# Patient Record
Sex: MALE | Race: WHITE | NOT HISPANIC OR LATINO | Employment: OTHER | URBAN - NONMETROPOLITAN AREA
[De-identification: names, ages, dates, MRNs, and addresses within clinical notes are randomized per-mention and may not be internally consistent; named-entity substitution may affect disease eponyms.]

---

## 2017-01-06 ENCOUNTER — COMMUNICATION - GICH (OUTPATIENT)
Dept: INTERNAL MEDICINE | Facility: OTHER | Age: 82
End: 2017-01-06

## 2017-01-06 DIAGNOSIS — I10 ESSENTIAL (PRIMARY) HYPERTENSION: ICD-10-CM

## 2017-01-07 ENCOUNTER — COMMUNICATION - GICH (OUTPATIENT)
Dept: INTERNAL MEDICINE | Facility: OTHER | Age: 82
End: 2017-01-07

## 2017-01-07 DIAGNOSIS — R39.9 UNSPECIFIED SYMPTOMS AND SIGNS INVOLVING THE GENITOURINARY SYSTEM: ICD-10-CM

## 2017-02-06 ENCOUNTER — COMMUNICATION - GICH (OUTPATIENT)
Dept: INTERNAL MEDICINE | Facility: OTHER | Age: 82
End: 2017-02-06

## 2017-02-06 DIAGNOSIS — R39.9 UNSPECIFIED SYMPTOMS AND SIGNS INVOLVING THE GENITOURINARY SYSTEM: ICD-10-CM

## 2017-02-07 ENCOUNTER — COMMUNICATION - GICH (OUTPATIENT)
Dept: INTERNAL MEDICINE | Facility: OTHER | Age: 82
End: 2017-02-07

## 2017-02-07 DIAGNOSIS — I10 ESSENTIAL (PRIMARY) HYPERTENSION: ICD-10-CM

## 2017-07-21 ENCOUNTER — HISTORY (OUTPATIENT)
Dept: INTERNAL MEDICINE | Facility: OTHER | Age: 82
End: 2017-07-21

## 2017-07-21 ENCOUNTER — OFFICE VISIT - GICH (OUTPATIENT)
Dept: INTERNAL MEDICINE | Facility: OTHER | Age: 82
End: 2017-07-21

## 2017-07-21 DIAGNOSIS — K64.9 HEMORRHOIDS: ICD-10-CM

## 2017-10-07 ENCOUNTER — COMMUNICATION - GICH (OUTPATIENT)
Dept: INTERNAL MEDICINE | Facility: OTHER | Age: 82
End: 2017-10-07

## 2017-10-07 DIAGNOSIS — I10 ESSENTIAL (PRIMARY) HYPERTENSION: ICD-10-CM

## 2017-11-20 ENCOUNTER — HISTORY (OUTPATIENT)
Dept: INTERNAL MEDICINE | Facility: OTHER | Age: 82
End: 2017-11-20

## 2017-11-20 ENCOUNTER — OFFICE VISIT - GICH (OUTPATIENT)
Dept: INTERNAL MEDICINE | Facility: OTHER | Age: 82
End: 2017-11-20

## 2017-11-20 DIAGNOSIS — I10 ESSENTIAL (PRIMARY) HYPERTENSION: ICD-10-CM

## 2017-11-20 DIAGNOSIS — J32.0 CHRONIC MAXILLARY SINUSITIS: ICD-10-CM

## 2017-11-20 DIAGNOSIS — R39.9 UNSPECIFIED SYMPTOMS AND SIGNS INVOLVING THE GENITOURINARY SYSTEM: ICD-10-CM

## 2017-11-20 DIAGNOSIS — M19.90 OSTEOARTHRITIS: ICD-10-CM

## 2017-11-20 DIAGNOSIS — R73.9 HYPERGLYCEMIA: ICD-10-CM

## 2017-11-20 LAB
A/G RATIO - HISTORICAL: 1.7 (ref 1–2)
ALBUMIN SERPL-MCNC: 4.3 G/DL (ref 3.5–5.7)
ALP SERPL-CCNC: 59 IU/L (ref 34–104)
ALT (SGPT) - HISTORICAL: 19 IU/L (ref 7–52)
ANION GAP - HISTORICAL: 12 (ref 5–18)
AST SERPL-CCNC: 17 IU/L (ref 13–39)
BILIRUB SERPL-MCNC: 1.2 MG/DL (ref 0.3–1)
BUN SERPL-MCNC: 21 MG/DL (ref 7–25)
BUN/CREAT RATIO - HISTORICAL: 24
CALCIUM SERPL-MCNC: 9.2 MG/DL (ref 8.6–10.3)
CHLORIDE SERPLBLD-SCNC: 103 MMOL/L (ref 98–107)
CHOL/HDL RATIO - HISTORICAL: 2.38
CHOLESTEROL TOTAL: 131 MG/DL
CO2 SERPL-SCNC: 23 MMOL/L (ref 21–31)
CREAT SERPL-MCNC: 0.87 MG/DL (ref 0.7–1.3)
GFR IF NOT AFRICAN AMERICAN - HISTORICAL: >60 ML/MIN/1.73M2
GLOBULIN - HISTORICAL: 2.6 G/DL (ref 2–3.7)
GLUCOSE SERPL-MCNC: 104 MG/DL (ref 70–105)
HDLC SERPL-MCNC: 55 MG/DL (ref 23–92)
LDLC SERPL CALC-MCNC: 67 MG/DL
NON-HDL CHOLESTEROL - HISTORICAL: 76 MG/DL
POTASSIUM SERPL-SCNC: 3.8 MMOL/L (ref 3.5–5.1)
PROT SERPL-MCNC: 6.9 G/DL (ref 6.4–8.9)
PROVIDER ORDERDED STATUS - HISTORICAL: NORMAL
SODIUM SERPL-SCNC: 138 MMOL/L (ref 133–143)
TRIGL SERPL-MCNC: 43 MG/DL

## 2017-11-20 ASSESSMENT — PATIENT HEALTH QUESTIONNAIRE - PHQ9: SUM OF ALL RESPONSES TO PHQ QUESTIONS 1-9: 0

## 2017-12-12 ENCOUNTER — OFFICE VISIT - GICH (OUTPATIENT)
Dept: INTERNAL MEDICINE | Facility: OTHER | Age: 82
End: 2017-12-12

## 2017-12-12 ENCOUNTER — HISTORY (OUTPATIENT)
Dept: INTERNAL MEDICINE | Facility: OTHER | Age: 82
End: 2017-12-12

## 2017-12-12 DIAGNOSIS — J34.89 OTHER SPECIFIED DISORDERS OF NOSE AND NASAL SINUSES: ICD-10-CM

## 2017-12-12 ASSESSMENT — PATIENT HEALTH QUESTIONNAIRE - PHQ9: SUM OF ALL RESPONSES TO PHQ QUESTIONS 1-9: 0

## 2017-12-27 NOTE — PROGRESS NOTES
Patient Information     Patient Name MRN Sex Jeffrey Alanis [id number] Male 5/3/1932      Progress Notes by Cody Souza MD at 2017 10:00 AM     Author:  Cody Souza MD Service:  (none) Author Type:  Physician     Filed:  2017 10:21 AM Encounter Date:  2017 Status:  Signed     :  Cody Souza MD (Physician)            SUBJECTIVE:    Jeffrey Cespedes is a 85 y.o. male who presents for hemorrhoid complaints.    HPI Comments: He is here today with a possible hemorrhoid. He's noticed a lump in the perirectal region that has been present for the past week or so. Bowel movements have been regular. There is no pain associated with this. He does not have any bleeding associated with this. He has not had a hemorrhoid in the past but has had seizures when he was younger.      Allergies     Allergen  Reactions     Iodine Hives   ,   Current Outpatient Prescriptions     Medication  Sig     aspirin 81 mg tablet Take 81 mg by mouth once daily.     finasteride (PROSCAR) 5 mg tablet Take 1 tablet by mouth once daily.     GLUCOSAMINE SULFATE ORAL Take 2 Tabs by mouth once daily.     hydroCHLOROthiazide (HCTZ) 25 mg tablet Take 1 tablet by mouth once daily.     ibuprofen (ADVIL; MOTRIN) 200 mg tablet Take 1 tablet by mouth once daily.     lisinopril (PRINIVIL; ZESTRIL) 10 mg tablet TAKE 1 TABLET BY MOUTH ONCE DAILY     lisinopril (PRINIVIL; ZESTRIL) 10 mg tablet Take 1 tablet by mouth once daily.     loratadine (CLARITIN) 10 mg tablet Take 10 mg by mouth once daily if needed.     multivitamin (MVI) tablet Take 1 tablet by mouth once daily.     tamsulosin (FLOMAX) 0.4 mg capsule Take 1 capsule by mouth once daily after a meal.     No current facility-administered medications for this visit.      Medications have been reviewed by me and are current to the best of my knowledge and ability. ,   Past Medical History:     Diagnosis  Date     Actinic keratosis 10/18/2011     History of SCC (squamous cell  "carcinoma) of skin 2010     Hypertension      Lower urinary tract symptoms (LUTS)      Nephrolithiasis 1977, 1989    Reoccured      Osteoarthritis      Tubular adenoma 2006    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      Zoster 2009    and   Patient Active Problem List      Diagnosis Date Noted     Chronic maxillary sinusitis 11/15/2016     Hyperglycemia 11/15/2016     Lower urinary tract symptoms (LUTS) 09/09/2014     ACP (advance care planning) 12/12/2013     Hypertension      DEGENERATIVE JOINT DISEASE        REVIEW OF SYSTEMS:  Review of Systems   All other systems reviewed and are negative.      OBJECTIVE:  /74  Pulse 80  Ht 1.727 m (5' 8\")  Wt 75.8 kg (167 lb 3.2 oz)  BMI 25.42 kg/m2    EXAM:   Physical Exam   Constitutional: He is well-developed, well-nourished, and in no distress. No distress.   Genitourinary: Rectal exam shows external hemorrhoid.   Genitourinary Comments: Pea sized external hemorrhoid at 9:00   Skin: He is not diaphoretic.   Nursing note and vitals reviewed.      ASSESSMENT/PLAN:    ICD-10-CM    1. Hemorrhoids, unspecified hemorrhoid type K64.9         Plan:  This hemorrhoid is fairly small and is already improving. I recommended continued watchful waiting. He'll let me know if this does not resolve or improve.          "

## 2017-12-27 NOTE — PROGRESS NOTES
Patient Information     Patient Name MRN Jeffrey Galdamez [id number] Male 5/3/1932      Progress Notes by Cody Souza MD at 2017  1:00 PM     Author:  Cody Souza MD Service:  (none) Author Type:  Physician     Filed:  2017  1:24 PM Encounter Date:  2017 Status:  Signed     :  Cody Souza MD (Physician)            SUBJECTIVE:    Jeffrey Cespedes is a 85 y.o. male who presents for comprehensive review of their multiple medical problems and review of medications, renewal of medications and update on necessary health maintenance issues.      HPI Comments: This patient is here today for complete evaluation. In most respects he feels well with no complaints or concerns. He has a history of hypertension. He is on 2 drug therapy for his hypertension. He tolerates this medication well. He does not have any end organ disease as a result of the hypertension.    He also has significant lower urinary tract difficulties. He takes finasteride as well as Flomax on a daily basis to control his symptoms. With this, his urinary symptoms have been doing quite well and he doesn't feel that anything further needs to be done are addressed.    He has a history of chronic rhinitis and sinusitis. The last time he was in I recommended that he take Claritin on a daily basis. He did that and his symptoms improved but when he tried to go off of it, his symptoms started to recur. I recommended to the patient that he stay on this indefinitely and he well.    He does have some osteoarthritis. He takes glucosamine daily as well as ibuprofen when needed.    Other than that he feels great. Medications are reconciled. Past medical history, past surgical history, family history and social histories are all reviewed and updated. He is up-to-date with all immunizations. He was recently in to see his dermatologist, Dr. Barber, so that is up-to-date as well.      Allergies     Allergen  Reactions     Iodine Hives    ,   Current Outpatient Prescriptions     Medication  Sig     aspirin 81 mg tablet Take 81 mg by mouth once daily.     finasteride (PROSCAR) 5 mg tablet Take 1 tablet by mouth once daily.     GLUCOSAMINE SULFATE ORAL Take 2 Tabs by mouth once daily.     hydroCHLOROthiazide (HCTZ) 25 mg tablet Take 1 tablet by mouth once daily.     ibuprofen (ADVIL; MOTRIN) 200 mg tablet Take 1 tablet by mouth once daily.     lisinopril (PRINIVIL; ZESTRIL) 10 mg tablet TAKE 1 TABLET BY MOUTH ONCE DAILY     loratadine (CLARITIN) 10 mg tablet Take 10 mg by mouth once daily if needed.     multivitamin (MVI) tablet Take 1 tablet by mouth once daily.     tamsulosin (FLOMAX) 0.4 mg capsule Take 1 capsule by mouth once daily after a meal.     No current facility-administered medications for this visit.      Medications have been reviewed by me and are current to the best of my knowledge and ability. ,   Past Medical History:     Diagnosis  Date     Environmental allergies      History of SCC (squamous cell carcinoma) of skin 2010     Hypertension      Lower urinary tract symptoms (LUTS)      Nephrolithiasis 1977, 1989    Reoccured      Osteoarthritis      Tubular adenoma 2006    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      Zoster 2009   ,   Patient Active Problem List      Diagnosis Date Noted     Chronic maxillary sinusitis 11/15/2016     Hyperglycemia 11/15/2016     Lower urinary tract symptoms (LUTS) 09/09/2014     ACP (advance care planning) 12/12/2013     Hypertension      DEGENERATIVE JOINT DISEASE    ,   Past Surgical History:      Procedure  Laterality Date     CATARACT REMOVAL Bilateral      COLONOSCOPY SCREENING  2006    Repeat in 2011       COLONOSCOPY SCREENING  2011    F/U 2021, if appropriate       FLEXIBLE SIGMOIDOSCOPY  1998    Normal       FRACTURE TREATMENT Right 1977    Compression fracture of right tibia. Skiing accident, surgical repair       HERNIA REPAIR Left 10/29/13    [facility] with mesh       HERNIA REPAIR Right 09/2014      SHOULDER ARTHROSCOPY Right 2013    and   Social History       Substance Use Topics         Smoking status:  Former Smoker      Types: Cigarettes      Quit date: 1954      Smokeless tobacco:  Never Used      Alcohol use  2.5 oz/week     5 Standard drinks or equivalent per week      Family Status     Relation  Status     Father  at age 94     Mother  at age 95     Brother  at age 61     Brother  at age 76     Sister Alive     Child Alive     Child Alive     Child Alive     Child Alive     Social History     Social History        Marital status:       Spouse name: N/A     Number of children:  N/A     Years of education:  N/A     Social History Main Topics         Smoking status:  Former Smoker      Types: Cigarettes      Quit date: 1954      Smokeless tobacco:  Never Used      Alcohol use  2.5 oz/week     5 Standard drinks or equivalent per week      Drug use:  No      Sexual activity:  Not Asked      Other Topics  Concern     None      Social History Narrative     , four children, retired from the [employer].  Lives in town.                                                           REVIEW OF SYSTEMS:  Review of Systems   Constitutional: Negative for chills, diaphoresis, fever, malaise/fatigue and weight loss.   HENT: Negative for congestion, ear pain, nosebleeds, sore throat and tinnitus.    Eyes: Negative for blurred vision, double vision, photophobia, pain, discharge and redness.   Respiratory: Negative for cough, hemoptysis, sputum production, shortness of breath and wheezing.    Cardiovascular: Negative for chest pain, palpitations, orthopnea, claudication, leg swelling and PND.   Gastrointestinal: Negative for abdominal pain, blood in stool, constipation, diarrhea, heartburn, nausea and vomiting.   Genitourinary: Negative for dysuria, flank pain and hematuria.   Musculoskeletal: Negative for back pain, joint pain, myalgias and neck pain.   Skin:  "Negative for itching and rash.   Neurological: Negative for dizziness, tingling, tremors, speech change, loss of consciousness, weakness and headaches.   Psychiatric/Behavioral: Negative for depression, hallucinations, memory loss, substance abuse and suicidal ideas. The patient is not nervous/anxious.        OBJECTIVE:  /84  Pulse 68  Ht 1.721 m (5' 7.75\")  Wt 77.3 kg (170 lb 6.4 oz)  BMI 26.1 kg/m2    EXAM:   Physical Exam   Constitutional: He is oriented to person, place, and time and well-developed, well-nourished, and in no distress. No distress.   HENT:   Head: Normocephalic and atraumatic.   Right Ear: Tympanic membrane and external ear normal.   Left Ear: Tympanic membrane and external ear normal.   Nose: Nose normal.   Mouth/Throat: Oropharynx is clear and moist and mucous membranes are normal. No oropharyngeal exudate.   Eyes: Conjunctivae are normal. Pupils are equal, round, and reactive to light. No scleral icterus.   Neck: Normal range of motion. Neck supple. Normal carotid pulses, no hepatojugular reflux and no JVD present. Carotid bruit is not present. No tracheal deviation and no edema present. No thyroid mass and no thyromegaly present.   Cardiovascular: Normal rate, regular rhythm, normal heart sounds and intact distal pulses.  Exam reveals no gallop and no friction rub.    No murmur heard.  Pulmonary/Chest: Effort normal and breath sounds normal. No respiratory distress. He has no decreased breath sounds. He has no wheezes. He has no rhonchi. He has no rales. He exhibits no tenderness.   Abdominal: Soft. Bowel sounds are normal. He exhibits no distension and no mass. There is no hepatosplenomegaly. There is no tenderness. There is no rebound and no guarding. Hernia confirmed negative in the right inguinal area and confirmed negative in the left inguinal area.   Genitourinary: Rectum normal, testes/scrotum normal and penis normal. Prostate is enlarged.   Genitourinary Comments: 2-3+ " prostate. No nodularity.   Musculoskeletal: Normal range of motion. He exhibits no edema or tenderness.   Lymphadenopathy:     He has no cervical adenopathy.   Neurological: He is alert and oriented to person, place, and time. He has normal motor skills. He displays normal reflexes. No cranial nerve deficit. He exhibits normal muscle tone. Gait normal. Coordination normal.   Skin: Skin is warm and dry. No rash noted. He is not diaphoretic. No cyanosis or erythema. No pallor. Nails show no clubbing.   Numerous skin lesions, all appeared benign   Psychiatric: Mood, memory, affect and judgment normal.   Nursing note and vitals reviewed.      ASSESSMENT/PLAN:    ICD-10-CM    1. Hypertension I10 lisinopril (PRINIVIL; ZESTRIL) 10 mg tablet      hydroCHLOROthiazide (HCTZ) 25 mg tablet      COMPLETE METABOLIC PANEL      LIPID PANEL   2. Lower urinary tract symptoms (LUTS) R39.9 tamsulosin (FLOMAX) 0.4 mg capsule      finasteride (PROSCAR) 5 mg tablet   3. Hyperglycemia R73.9    4. Chronic maxillary sinusitis J32.0    5. Osteoarthritis, unspecified osteoarthritis type, unspecified site M19.90         Plan:  He appears to be doing very well at this time. Medications will continue without change. Routine lab drawn and pending, I will send a letter with the results. I did not see any need for any other health maintenance issues as he no longer needs colonoscopy, PSA screening, or immunizations. He will continue with yearly dermatology visits. Assuming that his lab today looks acceptable and he has no further problems, he will follow-up with me on an annual basis.

## 2017-12-28 ENCOUNTER — COMMUNICATION - GICH (OUTPATIENT)
Dept: INTERNAL MEDICINE | Facility: OTHER | Age: 82
End: 2017-12-28

## 2017-12-28 DIAGNOSIS — R39.9 UNSPECIFIED SYMPTOMS AND SIGNS INVOLVING THE GENITOURINARY SYSTEM: ICD-10-CM

## 2017-12-28 NOTE — TELEPHONE ENCOUNTER
Patient Information     Patient Name MRN Sex Jeffrey Alanis [id number] Male 5/3/1932      Telephone Encounter by Gabriella Orantes RN at 10/10/2017  8:29 AM     Author:  Gabriella Orantes RN Service:  (none) Author Type:  NURS- Registered Nurse     Filed:  10/10/2017  8:31 AM Encounter Date:  10/7/2017 Status:  Signed     :  Gabriella Orantes RN (NURS- Registered Nurse)            Ace Inhibitors    Office visit in the past 12 months or per provider note.    Last visit with TOI SINGH was on: 2017 in [facility]  Next visit with TOI SINGH is on: No future appointment listed with this provider  Next visit with Internal Medicine is on: No future appointment listed in this department    Lab test requirements:  Creatinine and Potassium annually, if ordering lab, order BMP.  CREATININE (mg/dL)    Date Value   11/15/2016 0.82     POTASSIUM (mmol/L)    Date Value   11/15/2016 3.9       Max refill for 12 months from last office visit or per provider note  Prescription refilled per RN Medication Refill Policy.................... GABRIELLA ORANTES RN ....................  10/10/2017   8:30 AM

## 2017-12-30 NOTE — NURSING NOTE
Patient Information     Patient Name MRN Jeffrey Galdamez [id number] Male 5/3/1932      Nursing Note by Maegan Payne LPN at 2017 10:00 AM     Author:  Maegan Payne LPN Service:  (none) Author Type:  NURS- Licensed Practical Nurse     Filed:  2017 10:15 AM Encounter Date:  2017 Status:  Signed     :  Maegan Payne LPN (NURS- Licensed Practical Nurse)            The patient is here today to be seen for what he thinks may be hemorrhoids.  Maegan Payne LPN......2017  10:07 AM

## 2017-12-30 NOTE — NURSING NOTE
Patient Information     Patient Name MRN Jeffrey Galdamez [id number] Male 5/3/1932      Nursing Note by Maegan Payne LPN at 2017  1:00 PM     Author:  Maegan Payne LPN Service:  (none) Author Type:  NURS- Licensed Practical Nurse     Filed:  2017  1:05 PM Encounter Date:  2017 Status:  Signed     :  Maegan Payne LPN (NURS- Licensed Practical Nurse)            The patient is here today to be seen for his annual check up. He last saw his eye doctor in may of 2017.  Maegan Payne LPN......2017  12:55 PM          
CBC/BMP/EKG

## 2018-01-02 NOTE — TELEPHONE ENCOUNTER
Patient Information     Patient Name MRN Sex Jeffrey Alanis [id number] Male 5/3/1932      Telephone Encounter by Dustin Durant RN at 2017  8:13 AM     Author:  Dustin Durant RN Service:  (none) Author Type:  NURS- Registered Nurse     Filed:  2017  8:14 AM Encounter Date:  2017 Status:  Signed     :  Dustin Durant RN (NURS- Registered Nurse)            Ace Inhibitors    Office visit in the past 12 months or per provider note.    Last visit with TOI SINGH was on: 11/15/2016 in [facility]  Next visit with TOI SINGH is on: No future appointment listed with this provider  Next visit with Internal Medicine is on: No future appointment listed in this department    Lab test requirements:  Creatinine and Potassium annually, if ordering lab, order BMP.  CREATININE (mg/dL)    Date Value   11/15/2016 0.82     POTASSIUM (mmol/L)    Date Value   11/15/2016 3.9       Max refill for 12 months from last office visit or per provider note  Prescription refilled per RN Medication Refill Policy.................... DUSTIN DURANT RN ....................  2017   8:13 AM

## 2018-01-02 NOTE — TELEPHONE ENCOUNTER
Patient Information     Patient Name MRN Jeffrey Galdamez [id number] Male 5/3/1932      Telephone Encounter by Dustin Plascencia RN at 2017  8:00 AM     Author:  Dustin Plascencia RN Service:  (none) Author Type:  NURS- Registered Nurse     Filed:  2017  8:02 AM Encounter Date:  2017 Status:  Signed     :  Dustin Plascencia RN (NURS- Registered Nurse)            Refilled 11/15/16, 90 x 3.  Unable to complete prescription refill per RN Medication Refill Policy.................... DUSTIN PLASCENCIA RN ....................  2017   8:01 AM

## 2018-01-03 NOTE — TELEPHONE ENCOUNTER
Patient Information     Patient Name MRN Jeffrey Galdamez [id number] Male 5/3/1932      Telephone Encounter by Dustin Plascencia RN at 2017  8:14 AM     Author:  Dustin Plascencia RN Service:  (none) Author Type:  NURS- Registered Nurse     Filed:  2017  8:15 AM Encounter Date:  2017 Status:  Signed     :  Dustin Plascencia RN (NURS- Registered Nurse)            Medication was refilled 11/15/16 for a year.  Unable to complete prescription refill per RN Medication Refill Policy.................... DUSTIN PLASCENCIA RN ....................  2017   8:14 AM

## 2018-01-03 NOTE — TELEPHONE ENCOUNTER
Patient Information     Patient Name MRN Jeffrey Galdamez [id number] Male 5/3/1932      Telephone Encounter by Dustin Plascencia RN at 2017 10:29 AM     Author:  Dustin Plascencia RN Service:  (none) Author Type:  NURS- Registered Nurse     Filed:  2017 10:31 AM Encounter Date:  2017 Status:  Signed     :  Dustin Plascencia RN (NURS- Registered Nurse)            Refilled 11/15/16 for a year.  Unable to complete prescription refill per RN Medication Refill Policy.................... DUSTIN PLASCENCIA RN ....................  2017   10:30 AM

## 2018-01-07 ENCOUNTER — COMMUNICATION - GICH (OUTPATIENT)
Dept: INTERNAL MEDICINE | Facility: OTHER | Age: 83
End: 2018-01-07

## 2018-01-07 DIAGNOSIS — I10 ESSENTIAL (PRIMARY) HYPERTENSION: ICD-10-CM

## 2018-01-08 ENCOUNTER — COMMUNICATION - GICH (OUTPATIENT)
Dept: INTERNAL MEDICINE | Facility: OTHER | Age: 83
End: 2018-01-08

## 2018-01-08 DIAGNOSIS — R39.9 UNSPECIFIED SYMPTOMS AND SIGNS INVOLVING THE GENITOURINARY SYSTEM: ICD-10-CM

## 2018-01-25 VITALS
WEIGHT: 170.4 LBS | SYSTOLIC BLOOD PRESSURE: 104 MMHG | DIASTOLIC BLOOD PRESSURE: 84 MMHG | BODY MASS INDEX: 25.82 KG/M2 | HEIGHT: 68 IN | HEIGHT: 68 IN | SYSTOLIC BLOOD PRESSURE: 112 MMHG | DIASTOLIC BLOOD PRESSURE: 74 MMHG | WEIGHT: 167.2 LBS | HEART RATE: 68 BPM | HEART RATE: 80 BPM | BODY MASS INDEX: 25.34 KG/M2

## 2018-02-04 ASSESSMENT — PATIENT HEALTH QUESTIONNAIRE - PHQ9: SUM OF ALL RESPONSES TO PHQ QUESTIONS 1-9: 0

## 2018-02-07 ENCOUNTER — HOSPITAL ENCOUNTER (OUTPATIENT)
Dept: RADIOLOGY | Facility: OTHER | Age: 83
End: 2018-02-07
Attending: INTERNAL MEDICINE

## 2018-02-07 ENCOUNTER — HISTORY (OUTPATIENT)
Dept: INTERNAL MEDICINE | Facility: OTHER | Age: 83
End: 2018-02-07

## 2018-02-07 ENCOUNTER — OFFICE VISIT - GICH (OUTPATIENT)
Dept: INTERNAL MEDICINE | Facility: OTHER | Age: 83
End: 2018-02-07

## 2018-02-07 DIAGNOSIS — R09.81 NASAL CONGESTION: ICD-10-CM

## 2018-02-07 DIAGNOSIS — R05.9 COUGH: ICD-10-CM

## 2018-02-09 ENCOUNTER — DOCUMENTATION ONLY (OUTPATIENT)
Dept: FAMILY MEDICINE | Facility: OTHER | Age: 83
End: 2018-02-09

## 2018-02-09 VITALS
TEMPERATURE: 96.3 F | SYSTOLIC BLOOD PRESSURE: 110 MMHG | HEART RATE: 80 BPM | DIASTOLIC BLOOD PRESSURE: 78 MMHG | WEIGHT: 168 LBS | BODY MASS INDEX: 25.73 KG/M2

## 2018-02-09 VITALS
SYSTOLIC BLOOD PRESSURE: 104 MMHG | TEMPERATURE: 97 F | DIASTOLIC BLOOD PRESSURE: 62 MMHG | BODY MASS INDEX: 26.01 KG/M2 | WEIGHT: 169.8 LBS | HEART RATE: 104 BPM

## 2018-02-09 PROBLEM — M19.90 OSTEOARTHROSIS: Status: ACTIVE | Noted: 2018-02-09

## 2018-02-09 PROBLEM — I10 HYPERTENSION: Status: ACTIVE | Noted: 2018-02-09

## 2018-02-09 RX ORDER — METHYLPREDNISOLONE 4 MG
1000 TABLET, DOSE PACK ORAL DAILY
COMMUNITY

## 2018-02-09 RX ORDER — LORATADINE 10 MG/1
10 TABLET ORAL DAILY PRN
COMMUNITY
End: 2024-07-01

## 2018-02-09 RX ORDER — DIPHENOXYLATE HYDROCHLORIDE AND ATROPINE SULFATE 2.5; .025 MG/1; MG/1
1 TABLET ORAL DAILY
COMMUNITY

## 2018-02-09 RX ORDER — LISINOPRIL 10 MG/1
10 TABLET ORAL DAILY
COMMUNITY
Start: 2017-11-20 | End: 2018-04-02

## 2018-02-09 RX ORDER — HYDROCHLOROTHIAZIDE 25 MG/1
25 TABLET ORAL DAILY
COMMUNITY
Start: 2017-11-20 | End: 2018-12-07

## 2018-02-09 RX ORDER — FINASTERIDE 5 MG/1
5 TABLET, FILM COATED ORAL DAILY
COMMUNITY
Start: 2017-11-20 | End: 2018-12-07

## 2018-02-09 RX ORDER — TAMSULOSIN HYDROCHLORIDE 0.4 MG/1
0.4 CAPSULE ORAL
COMMUNITY
Start: 2017-11-20 | End: 2018-11-25

## 2018-02-09 RX ORDER — IBUPROFEN 200 MG
200 TABLET ORAL DAILY
COMMUNITY
Start: 2013-09-16

## 2018-02-11 ASSESSMENT — PATIENT HEALTH QUESTIONNAIRE - PHQ9: SUM OF ALL RESPONSES TO PHQ QUESTIONS 1-9: 0

## 2018-02-12 NOTE — NURSING NOTE
Patient Information     Patient Name MRN Sex Jeffrey Alanis [id number] Male 5/3/1932      Nursing Note by Maegan Payne LPN at 2017  8:00 AM     Author:  Maegan Payne LPN Service:  (none) Author Type:  NURS- Licensed Practical Nurse     Filed:  2017  8:05 AM Encounter Date:  2017 Status:  Signed     :  Maegan Payne LPN (NURS- Licensed Practical Nurse)            The patient is here today to be seen for continuing sinus issues.  Maegan Payne LPN......2017  7:59 AM

## 2018-02-12 NOTE — TELEPHONE ENCOUNTER
Patient Information     Patient Name MRN Jeffrey Galdamez [id number] Male 5/3/1932      Telephone Encounter by Gabriella Orantes RN at 2018  2:06 PM     Author:  Gabriella Orantes RN Service:  (none) Author Type:  NURS- Registered Nurse     Filed:  2018  2:07 PM Encounter Date:  2017 Status:  Signed     :  Gabriella Orantes RN (NURS- Registered Nurse)            Proscar refilled on 17 #90 x 3 refills to [facility].  GABRIELLA ORANTES, GREG ....................  2018   2:06 PM

## 2018-02-12 NOTE — TELEPHONE ENCOUNTER
Patient Information     Patient Name MRN Jeffrey Galdamez [id number] Male 5/3/1932      Telephone Encounter by Marivel Venegas RN at 1/10/2018  9:11 AM     Author:  Marivel Venegas RN Service:  (none) Author Type:  NURS- Registered Nurse     Filed:  1/10/2018  9:14 AM Encounter Date:  2018 Status:  Signed     :  Marivel Venegas RN (NURS- Registered Nurse)            Pharmacy is requesting refill of lisinopril. Medication list in chart shows it was last filled on 17 for #90 X 3 refills - a years supply. Receipt was confirmed by requesting pharmacy. Too soon to refill.   Unable to complete prescription refill per RN Medication Refill Policy.................... Marivel Venegas RN ....................  1/10/2018   9:12 AM

## 2018-02-12 NOTE — PROGRESS NOTES
Patient Information     Patient Name MRN Sex Jeffrey Alanis [id number] Male 5/3/1932      Progress Notes by Cody Souza MD at 2017  8:00 AM     Author:  Cody Souza MD Service:  (none) Author Type:  Physician     Filed:  2017  8:16 AM Encounter Date:  2017 Status:  Signed     :  Cody Souza MD (Physician)            SUBJECTIVE:    Jeffrey Cespedes is a 85 y.o. male who presents for sinus concerns.    HPI Comments: He is in today with a complaint of sinus pain. This has been going on for 3 weeks. He normally has some chronic sinus problems that is controlled with Claritin. He has had a problem over the last 3 weeks as mentioned with continued sinus pain in the maxillary region, left slightly greater than right. He's had a sore throat with this as well. He does not think that he has had a fever. He has a little bit of a cough, nothing too substantial. He does continue to take the Claritin on a daily basis. He had a similar problem a year ago when he saw the dentist when he thought he had tooth problems. The dentist told him it was a sinus infection and treated him with amoxicillin.      Allergies     Allergen  Reactions     Iodine Hives   ,   Current Outpatient Prescriptions     Medication  Sig     aspirin 81 mg tablet Take 81 mg by mouth once daily.     finasteride (PROSCAR) 5 mg tablet Take 1 tablet by mouth once daily.     GLUCOSAMINE SULFATE ORAL Take 2 Tabs by mouth once daily.     hydroCHLOROthiazide (HCTZ) 25 mg tablet Take 1 tablet by mouth once daily.     ibuprofen (ADVIL; MOTRIN) 200 mg tablet Take 1 tablet by mouth once daily.     lisinopril (PRINIVIL; ZESTRIL) 10 mg tablet Take 1 tablet by mouth once daily.     loratadine (CLARITIN) 10 mg tablet Take 10 mg by mouth once daily if needed.     multivitamin (MVI) tablet Take 1 tablet by mouth once daily.     tamsulosin (FLOMAX) 0.4 mg capsule Take 1 capsule by mouth once daily after a meal.     No current  facility-administered medications for this visit.      Medications have been reviewed by me and are current to the best of my knowledge and ability. ,   Past Medical History:     Diagnosis  Date     Environmental allergies      History of SCC (squamous cell carcinoma) of skin 2010     Hypertension      Lower urinary tract symptoms (LUTS)      Nephrolithiasis 1977, 1989    Reoccured      Osteoarthritis      Tubular adenoma 2006    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      Zoster 2009    and   Patient Active Problem List      Diagnosis Date Noted     Chronic maxillary sinusitis 11/15/2016     Hyperglycemia 11/15/2016     Lower urinary tract symptoms (LUTS) 09/09/2014     ACP (advance care planning) 12/12/2013     Hypertension      DEGENERATIVE JOINT DISEASE        REVIEW OF SYSTEMS:  Review of Systems   All other systems reviewed and are negative.      OBJECTIVE:  /78 (Cuff Site: Right Arm, Position: Sitting, Cuff Size: Adult Regular)  Pulse 80  Temp 96.3  F (35.7  C) (Tympanic)  Wt 76.2 kg (168 lb)  BMI 25.73 kg/m2    EXAM:   Physical Exam   Constitutional: He is well-developed, well-nourished, and in no distress. No distress.   HENT:   Head: Normocephalic.   Right Ear: External ear normal.   Left Ear: External ear normal.   Nose: Right sinus exhibits maxillary sinus tenderness. Right sinus exhibits no frontal sinus tenderness. Left sinus exhibits maxillary sinus tenderness. Left sinus exhibits no frontal sinus tenderness.   Mouth/Throat: Oropharynx is clear and moist. No oropharyngeal exudate.   Eyes: Pupils are equal, round, and reactive to light.   Neck: Normal range of motion. Neck supple. No JVD present. No tracheal deviation present. No thyromegaly present.   Pulmonary/Chest: Effort normal and breath sounds normal. No respiratory distress. He has no wheezes. He has no rales.   Lymphadenopathy:     He has no cervical adenopathy.   Skin: He is not diaphoretic.   Nursing note and vitals  reviewed.      ASSESSMENT/PLAN:    ICD-10-CM    1. Sinus pain J34.89 amoxicillin-clavulanate 875-125 mg tablet (AUGMENTIN)        Plan:  Reviewed the situation with the patient. Based on his findings and his symptoms as well as length of disability, it could be that he has a sinus infection requiring an antibiotic. Augmentin twice daily, warned of possible side effects. He will notify me if he does not improve or if he has any problems with therapy.

## 2018-02-12 NOTE — TELEPHONE ENCOUNTER
Patient Information     Patient Name MRN Jeffrey Galdamez [id number] Male 5/3/1932      Telephone Encounter by Marivel Venegas RN at 1/10/2018 10:31 AM     Author:  Marivel Venegas RN Service:  (none) Author Type:  NURS- Registered Nurse     Filed:  1/10/2018 10:34 AM Encounter Date:  2018 Status:  Signed     :  Marivel Venegas RN (NURS- Registered Nurse)            Pharmacy is requesting fill of Tamsulosin. Rx filled on 17 for #90 X 3 refills. Too soon to refill.   Prescription refilled per RN Medication Refill Policy.................... Marivel Venegas RN ....................  1/10/2018   10:33 AM

## 2018-02-13 NOTE — PROGRESS NOTES
Patient Information     Patient Name MRN Sex Jeffrey Alanis [id number] Male 5/3/1932      Progress Notes by Cody Souza MD at 2018  3:00 PM     Author:  Cody Souza MD Service:  (none) Author Type:  Physician     Filed:  2018  3:27 PM Encounter Date:  2018 Status:  Signed     :  Cody Souza MD (Physician)            SUBJECTIVE:    Jeffrey Cespedes is a 85 y.o. male who presents for sinus drainage.    HPI Comments: He is here today complaining of postnasal drip and sinus drainage for the past couple of years. Been taking Claritin without any help. He did have a sinus infection in December but that got better. He's having a lot of trouble with cough and phlegm as a result of this. He doesn't feel short of breath. He hasn't tried anything other than the Claritin. No other complaints with this although he does point out that sometimes the cough and the phlegm are so bothersome that he needs to adjust his lifestyle.      Allergies     Allergen  Reactions     Iodine Hives   ,   Current Outpatient Prescriptions     Medication  Sig     aspirin 81 mg tablet Take 81 mg by mouth once daily.     finasteride (PROSCAR) 5 mg tablet Take 1 tablet by mouth once daily.     GLUCOSAMINE SULFATE ORAL Take 2 Tabs by mouth once daily.     hydroCHLOROthiazide (HCTZ) 25 mg tablet Take 1 tablet by mouth once daily.     ibuprofen (ADVIL; MOTRIN) 200 mg tablet Take 1 tablet by mouth once daily.     lisinopril (PRINIVIL; ZESTRIL) 10 mg tablet Take 1 tablet by mouth once daily.     loratadine (CLARITIN) 10 mg tablet Take 10 mg by mouth once daily if needed.     multivitamin (MVI) tablet Take 1 tablet by mouth once daily.     tamsulosin (FLOMAX) 0.4 mg capsule Take 1 capsule by mouth once daily after a meal.     No current facility-administered medications for this visit.      Medications have been reviewed by me and are current to the best of my knowledge and ability. ,   Past Medical History:     Diagnosis   Date     Environmental allergies      History of SCC (squamous cell carcinoma) of skin 2010     Hypertension      Lower urinary tract symptoms (LUTS)      Nephrolithiasis 1977, 1989    Reoccured      Osteoarthritis      Tubular adenoma 2006    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      Zoster 2009    and   Patient Active Problem List      Diagnosis Date Noted     Chronic maxillary sinusitis 11/15/2016     Hyperglycemia 11/15/2016     Lower urinary tract symptoms (LUTS) 09/09/2014     ACP (advance care planning) 12/12/2013     Hypertension      DEGENERATIVE JOINT DISEASE        REVIEW OF SYSTEMS:  Review of Systems   All other systems reviewed and are negative.      OBJECTIVE:  /62 (Cuff Site: Right Arm, Cuff Size: Adult Regular)  Pulse (!) 104  Temp 97  F (36.1  C) (Tympanic)  Wt 77 kg (169 lb 12.8 oz)  BMI 26.01 kg/m2    EXAM:   Physical Exam   Constitutional: He is well-developed, well-nourished, and in no distress. No distress.   HENT:   Head: Normocephalic.   Right Ear: External ear normal.   Left Ear: External ear normal.   Mouth/Throat: Oropharynx is clear and moist. No oropharyngeal exudate.   No sinus tenderness   Neck: Normal range of motion. Neck supple. No JVD present. No tracheal deviation present. No thyromegaly present.   Pulmonary/Chest: Effort normal and breath sounds normal. No respiratory distress. He has no wheezes. He has no rales.   Lymphadenopathy:     He has no cervical adenopathy.   Skin: He is not diaphoretic.   Nursing note and vitals reviewed.      ASSESSMENT/PLAN:    ICD-10-CM    1. Cough R05 XR CHEST 2 VIEWS PA AND LATERAL   2. Nasal congestion R09.81 fluticasone (50 mcg per actuation) nasal solution (FLONASE)        Plan:  His chest x-ray was normal. His exam is normal. I am going to have him stop the lisinopril and start Flonase. In 2 weeks if not better he will call and I will set him up to see ENT and he will resume the lisinopril and stopped Flonase. If he does get  improvement in 2 weeks, he will resume the lisinopril and see how things progress.

## 2018-02-13 NOTE — NURSING NOTE
"Patient Information     Patient Name MRN Jeffrey Galdamez [id number] Male 5/3/1932      Nursing Note by Dali Blount at 2018  3:00 PM     Author:  Dali Blount Service:  (none) Author Type:  (none)     Filed:  2018  3:09 PM Encounter Date:  2018 Status:  Signed     :  Dali Blount            Patient presents for cough and congestion. Patient states,\"congestion has been on going for over a year\".    Dali Blount LPN..............2018 3:05 PM          "

## 2018-02-14 RX ORDER — HYDROCHLOROTHIAZIDE 25 MG/1
25 TABLET ORAL DAILY
Qty: 30 TABLET | OUTPATIENT
Start: 2018-02-14

## 2018-02-14 NOTE — TELEPHONE ENCOUNTER
Filled 11/20/17 for a year. Due 11/20/18. Pharmacy alerted. Unable to complete prescription refill per RNMedication Refill Policy.................... Sheeba Balbuena ....................  2/14/2018   12:27 PM    Prescribing Provider: Cody Singh MD                 Order Date: 11/20/2017  Ordered by: CODY SINGH  Medication:hydroCHLOROthiazide (HCTZ) 25 mg tablet    Qty:90 tablet   Ref:3  Start:11/20/2017  End:              Route:Oral                  LAMAR:No   Class:eRx    Sig:Take 1 tablet by mouth once daily.    Pharmacy:[facility] [address] [place] [address] [address]  [address]              [address]  [address] - [phone]

## 2018-04-02 ENCOUNTER — OFFICE VISIT (OUTPATIENT)
Dept: INTERNAL MEDICINE | Facility: OTHER | Age: 83
End: 2018-04-02
Attending: INTERNAL MEDICINE
Payer: COMMERCIAL

## 2018-04-02 VITALS — DIASTOLIC BLOOD PRESSURE: 70 MMHG | WEIGHT: 175 LBS | BODY MASS INDEX: 26.81 KG/M2 | SYSTOLIC BLOOD PRESSURE: 118 MMHG

## 2018-04-02 DIAGNOSIS — I10 ESSENTIAL HYPERTENSION: Primary | ICD-10-CM

## 2018-04-02 PROCEDURE — 99213 OFFICE O/P EST LOW 20 MIN: CPT | Performed by: INTERNAL MEDICINE

## 2018-04-02 PROCEDURE — G0463 HOSPITAL OUTPT CLINIC VISIT: HCPCS

## 2018-04-02 ASSESSMENT — ENCOUNTER SYMPTOMS
CONSTITUTIONAL NEGATIVE: 1
EYES NEGATIVE: 1
ENDOCRINE NEGATIVE: 1
ALLERGIC/IMMUNOLOGIC NEGATIVE: 1

## 2018-04-02 NOTE — NURSING NOTE
The patient is here today to be seen for a blood pressure check up.  Maegan Payne LPN on 4/2/2018 at 12:41 PM

## 2018-04-02 NOTE — MR AVS SNAPSHOT
"              After Visit Summary   2018    Jeffrey Cespedes    MRN: [id number]           Patient Information     Date Of Birth          5/3/1932        Visit Information        Provider Department      2018 12:40 PM Cody Souza MD [facility]        Today's Diagnoses     Essential hypertension    -  1       Follow-ups after your visit        Who to contact     If you have questions or need follow up information about today's clinic visit or your schedule please contact [facility] directly at [phone].  Normal or non-critical lab and imaging results will be communicated to you by WEMShart, letter or phone within 4 business days after the clinic has received the results. If you do not hear from us within 7 days, please contact the clinic through Yahoo!t or phone. If you have a critical or abnormal lab result, we will notify you by phone as soon as possible.  Submit refill requests through ActiveO or call your pharmacy and they will forward the refill request to us. Please allow 3 business days for your refill to be completed.          Additional Information About Your Visit        MyChart Information     ActiveO lets you send messages to your doctor, view your test results, renew your prescriptions, schedule appointments and more. To sign up, go to [email or web address] . Click on \"Log in\" on the left side of the screen, which will take you to the Welcome page. Then click on \"Sign up Now\" on the right side of the page.     You will be asked to enter the access code listed below, as well as some personal information. Please follow the directions to create your username and password.     Your access code is: [email or web address]  Expires: 2018 12:56 PM     Your access code will  in 90 days. If you need help or a new code, please call your [facility] or [phone].        Care EveryWhere ID     This is your Care EveryWhere ID. This could be used by other " organizations to access your [facility] [place] medical records  [id number]        Your Vitals Were     BMI (Body Mass Index)                   26.81 kg/m2            Blood Pressure from Last 3 Encounters:   04/02/18 118/70   02/07/18 104/62   12/12/17 110/78    Weight from Last 3 Encounters:   04/02/18 175 lb (79.4 kg)   02/07/18 169 lb 12.8 oz (77 kg)   12/12/17 168 lb (76.2 kg)              Today, you had the following     No orders found for display         Today's Medication Changes          These changes are accurate as of 4/2/18 12:56 PM.  If you have any questions, ask your nurse or doctor.               Stop taking these medicines if you haven't already. Please contact your care team if you have questions.     lisinopril 10 MG tablet   Commonly known as:  PRINIVIL/ZESTRIL   Stopped by:  Cody Souza MD                    Primary Care Provider Office Phone # Fax #    Cody Souza [phone]       [address] [facility] [address]        Equal Access to Services     [facility]: Hadii aad ku hadasho Sotiffanie, waaxda luqadaha, qaybta kaalmada lory, jerrell beckett . [facility] [phone].    ATENCIÓN: Si habla español, tiene a ramos disposición servicios gratuitos de asistencia lingüística. [facility] [phone].    We comply with applicable federal civil rights laws and [place] laws. We do not discriminate on the basis of race, color, national origin, age, disability, sex, sexual orientation, or gender identity.            Thank you!     Thank you for choosing [facility] AND [facility]  for your care. Our goal is always to provide you with excellent care. Hearing back from our patients is one way we can continue to improve our services. Please take a few minutes to complete the written survey that you may receive in the mail after your visit with us. Thank you!             Your Updated Medication List - Protect others around you: Learn how to  safely use, store and throw away your medicines at www.disposemymeds.org.          This list is accurate as of 4/2/18 12:56 PM.  Always use your most recent med list.                   Brand Name Dispense Instructions for use Diagnosis    ASPIRIN 81 PO      Take 81 mg by mouth daily        finasteride 5 MG tablet    PROSCAR     Take 5 mg by mouth daily        Glucosamine Sulfate 500 MG Tabs      Take 1,000 mg by mouth daily        hydrochlorothiazide 25 MG tablet    HYDRODIURIL     Take 25 mg by mouth daily        ibuprofen 200 MG tablet    ADVIL/MOTRIN     Take 200 mg by mouth daily        loratadine 10 MG tablet    CLARITIN     Take 10 mg by mouth daily as needed        MULTI-VITAMINS Tabs      Take 1 tablet by mouth daily        tamsulosin 0.4 MG capsule    FLOMAX     Take 0.4 mg by mouth daily with food

## 2018-04-02 NOTE — PROGRESS NOTES
Chief Complaint:  F/U on blood pressure.    HPI: He is here today for follow-up.  He was having a lot of nasal congestion and discharge as well as a cough.  I put him on Flonase and took him off his lisinopril.  He used Flonase for 1 month.  He is off the lisinopril.  He is feeling much better with less congestion and definitely improvement in his cough.  He is here today to see if anything further needs to be done for medications including his blood pressure.  He has been checking it at home and gets somewhat fluctuating and varied readings.    Past Medical History:   Diagnosis Date     Benign neoplasm     2006,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     Calculus of kidney     1977, 1989,Reoccured     Essential (primary) hypertension     No Comments Provided     Osteoarthritis     No Comments Provided     Other allergy status, other than to drugs and biological substances     No Comments Provided     Personal history of other malignant neoplasm of skin (CODE)     2010     Unspecified symptoms and signs involving the genitourinary system     No Comments Provided     Zoster without complications     2009       Past Surgical History:   Procedure Laterality Date     ARTHROSCOPY SHOULDER      1/2013     COLONOSCOPY      2006,Repeat in 2011     COLONOSCOPY      2011,F/U 2021, if appropriate     EXTRACAPSULAR CATARACT EXTRATION WITH INTRAOCULAR LENS IMPLANT      No Comments Provided     FRACTURE SURGERY      1977,Compression fracture of right tibia. Skiing accident, surgical repair     OTHER SURGICAL HISTORY      10/29/13,,HERNIA REPAIR,Left,LIH with mesh     OTHER SURGICAL HISTORY      09/2014,,HERNIA REPAIR,Right     SIGMOIDOSCOPY FLEXIBLE      1998,Normal       Allergies   Allergen Reactions     Ace Inhibitors Cough     Iodine Hives       Current Outpatient Prescriptions   Medication Sig Dispense Refill     ASPIRIN 81 PO Take 81 mg by mouth daily       finasteride (PROSCAR) 5 MG tablet Take 5 mg by mouth daily        Glucosamine Sulfate 500 MG TABS Take 1,000 mg by mouth daily       hydrochlorothiazide (HYDRODIURIL) 25 MG tablet Take 25 mg by mouth daily       ibuprofen (ADVIL/MOTRIN) 200 MG tablet Take 200 mg by mouth daily       loratadine (CLARITIN) 10 MG tablet Take 10 mg by mouth daily as needed       Multiple Vitamin (MULTI-VITAMINS) TABS Take 1 tablet by mouth daily       tamsulosin (FLOMAX) 0.4 MG capsule Take 0.4 mg by mouth daily with food         Review of Systems   Constitutional: Negative.    Eyes: Negative.    Endocrine: Negative.    Allergic/Immunologic: Negative.        Physical Exam   Constitutional: He appears well-developed and well-nourished. No distress.   Skin: He is not diaphoretic.   Nursing note and vitals reviewed.    I checked his blood pressure today, 130/70.  His pulse was 75.    Assessment:        ICD-10-CM    1. Essential hypertension I10        Plan: This patient has side effects related to his ACE inhibitor.  He is off of it now and doing much better.  His blood pressure is still acceptable without the ACE inhibitor so he will just continue with meds as is.  Assuming all goes well, recheck in November for a yearly review.

## 2018-04-03 ASSESSMENT — PATIENT HEALTH QUESTIONNAIRE - PHQ9: SUM OF ALL RESPONSES TO PHQ QUESTIONS 1-9: 0

## 2018-07-23 NOTE — PROGRESS NOTES
Patient Information     Patient Name  Jeffrey Cespedes MRN  [id number] Sex  Male   5/3/1932      Letter by Cody Souza MD at      Author:  Cody Souza MD Service:  (none) Author Type:  (none)    Filed:   Encounter Date:  2017 Status:  (Other)           Jeffrey Cespedes  [address] [place] [address]          2017    Dear Mr. Cespedes:    Following are the tests completed during your last clinic visit:    Results for orders placed or performed in visit on 17      COMPLETE METABOLIC PANEL      Result  Value Ref Range    SODIUM 138 133 - 143 mmol/L    POTASSIUM 3.8 3.5 - 5.1 mmol/L    CHLORIDE 103 98 - 107 mmol/L    CO2,TOTAL 23 21 - 31 mmol/L    ANION GAP 12 5 - 18                    GLUCOSE 104 70 - 105 mg/dL    CALCIUM 9.2 8.6 - 10.3 mg/dL    BUN 21 7 - 25 mg/dL    CREATININE 0.87 0.70 - 1.30 mg/dL    BUN/CREAT RATIO           24                    GFR if African American >60 >60 ml/min/1.73m2    GFR if not African American >60 >60 ml/min/1.73m2    ALBUMIN 4.3 3.5 - 5.7 g/dL    PROTEIN,TOTAL 6.9 6.4 - 8.9 g/dL    GLOBULIN                  2.6 2.0 - 3.7 g/dL    A/G RATIO 1.7 1.0 - 2.0                    BILIRUBIN,TOTAL 1.2 (H) 0.3 - 1.0 mg/dL    ALK PHOSPHATASE 59 34 - 104 IU/L    ALT (SGPT) 19 7 - 52 IU/L    AST (SGOT) 17 13 - 39 IU/L   LIPID PANEL      Result  Value Ref Range    CHOLESTEROL,TOTAL 131 <200 mg/dL    TRIGLYCERIDES 43 <150 mg/dL    HDL CHOLESTEROL 55 23 - 92 mg/dL    NON-HDL CHOLESTEROL 76 <145 mg/dl    CHOL/HDL RATIO            2.38 <4.50                    LDL CHOLESTEROL 67 <100 mg/dL    PROVIDER ORDERED STATUS FASTING          Your blood tests look great. Congratulations on this report and keep up the good work. If you have any questions about your results, feel free to contact me.    Sincerely,      Cody Souza MD  Internal Medicine  [facility]

## 2018-11-25 DIAGNOSIS — R39.9 LOWER URINARY TRACT SYMPTOMS (LUTS): Primary | ICD-10-CM

## 2018-11-27 RX ORDER — TAMSULOSIN HYDROCHLORIDE 0.4 MG/1
CAPSULE ORAL
Qty: 90 CAPSULE | Refills: 1 | Status: SHIPPED | OUTPATIENT
Start: 2018-11-27 | End: 2018-12-07

## 2018-11-27 NOTE — TELEPHONE ENCOUNTER
Last OV 4/2/18 with PCP. TAMSULOSIN 0.4MG CAPSULES approved per [facility] Refill Protocol. Refill authorized for 90 days and 1 refill at this time.     Maame Jain RN on 11/27/2018 at 11:56 AM

## 2018-12-07 ENCOUNTER — OFFICE VISIT (OUTPATIENT)
Dept: INTERNAL MEDICINE | Facility: OTHER | Age: 83
End: 2018-12-07
Attending: INTERNAL MEDICINE
Payer: MEDICARE

## 2018-12-07 VITALS
SYSTOLIC BLOOD PRESSURE: 118 MMHG | DIASTOLIC BLOOD PRESSURE: 80 MMHG | RESPIRATION RATE: 16 BRPM | HEIGHT: 68 IN | BODY MASS INDEX: 25.85 KG/M2 | HEART RATE: 72 BPM | WEIGHT: 170.6 LBS

## 2018-12-07 DIAGNOSIS — R39.9 LOWER URINARY TRACT SYMPTOMS (LUTS): ICD-10-CM

## 2018-12-07 DIAGNOSIS — L72.3 SEBACEOUS CYST: ICD-10-CM

## 2018-12-07 DIAGNOSIS — R73.9 HYPERGLYCEMIA: ICD-10-CM

## 2018-12-07 DIAGNOSIS — K62.89 RECTAL IRRITATION: ICD-10-CM

## 2018-12-07 DIAGNOSIS — J32.0 CHRONIC MAXILLARY SINUSITIS: ICD-10-CM

## 2018-12-07 DIAGNOSIS — M19.91 PRIMARY OSTEOARTHRITIS, UNSPECIFIED SITE: ICD-10-CM

## 2018-12-07 DIAGNOSIS — I10 ESSENTIAL HYPERTENSION: Primary | ICD-10-CM

## 2018-12-07 LAB
ALBUMIN SERPL-MCNC: 4.2 G/DL (ref 3.5–5.7)
ALP SERPL-CCNC: 62 U/L (ref 34–104)
ALT SERPL W P-5'-P-CCNC: 15 U/L (ref 7–52)
ANION GAP SERPL CALCULATED.3IONS-SCNC: 9 MMOL/L (ref 3–14)
AST SERPL W P-5'-P-CCNC: 14 U/L (ref 13–39)
BILIRUB SERPL-MCNC: 1.2 MG/DL (ref 0.3–1)
BUN SERPL-MCNC: 18 MG/DL (ref 7–25)
CALCIUM SERPL-MCNC: 9 MG/DL (ref 8.6–10.3)
CHLORIDE SERPL-SCNC: 104 MMOL/L (ref 98–107)
CHOLEST SERPL-MCNC: 118 MG/DL
CO2 SERPL-SCNC: 25 MMOL/L (ref 21–31)
CREAT SERPL-MCNC: 0.8 MG/DL (ref 0.7–1.3)
GFR SERPL CREATININE-BSD FRML MDRD: >90 ML/MIN/1.7M2
GLUCOSE SERPL-MCNC: 126 MG/DL (ref 70–105)
HDLC SERPL-MCNC: 53 MG/DL (ref 23–92)
LDLC SERPL CALC-MCNC: 54 MG/DL
NONHDLC SERPL-MCNC: 65 MG/DL
POTASSIUM SERPL-SCNC: 3.7 MMOL/L (ref 3.5–5.1)
PROT SERPL-MCNC: 6.7 G/DL (ref 6.4–8.9)
SODIUM SERPL-SCNC: 138 MMOL/L (ref 134–144)
TRIGL SERPL-MCNC: 54 MG/DL

## 2018-12-07 PROCEDURE — 99215 OFFICE O/P EST HI 40 MIN: CPT | Performed by: INTERNAL MEDICINE

## 2018-12-07 PROCEDURE — G0463 HOSPITAL OUTPT CLINIC VISIT: HCPCS

## 2018-12-07 PROCEDURE — 36415 COLL VENOUS BLD VENIPUNCTURE: CPT | Performed by: INTERNAL MEDICINE

## 2018-12-07 PROCEDURE — 80053 COMPREHEN METABOLIC PANEL: CPT | Performed by: INTERNAL MEDICINE

## 2018-12-07 PROCEDURE — 80061 LIPID PANEL: CPT | Performed by: INTERNAL MEDICINE

## 2018-12-07 RX ORDER — FINASTERIDE 5 MG/1
5 TABLET, FILM COATED ORAL DAILY
Qty: 90 TABLET | Refills: 3 | Status: SHIPPED | OUTPATIENT
Start: 2018-12-07 | End: 2019-12-12

## 2018-12-07 RX ORDER — TAMSULOSIN HYDROCHLORIDE 0.4 MG/1
0.4 CAPSULE ORAL DAILY
Qty: 90 CAPSULE | Refills: 3 | Status: SHIPPED | OUTPATIENT
Start: 2018-12-07 | End: 2019-12-12

## 2018-12-07 RX ORDER — HYDROCHLOROTHIAZIDE 25 MG/1
25 TABLET ORAL DAILY
Qty: 90 TABLET | Refills: 3 | Status: SHIPPED | OUTPATIENT
Start: 2018-12-07 | End: 2019-12-12

## 2018-12-07 ASSESSMENT — ENCOUNTER SYMPTOMS
VOICE CHANGE: 0
ABDOMINAL DISTENTION: 0
DIAPHORESIS: 0
DIARRHEA: 0
NAUSEA: 0
AGITATION: 0
ADENOPATHY: 0
WOUND: 0
CONFUSION: 0
HALLUCINATIONS: 0
SEIZURES: 0
SLEEP DISTURBANCE: 0
SHORTNESS OF BREATH: 0
TROUBLE SWALLOWING: 0
NERVOUS/ANXIOUS: 0
NECK PAIN: 0
BLOOD IN STOOL: 0
VOMITING: 0
JOINT SWELLING: 0
HEADACHES: 0
PALPITATIONS: 0
DIZZINESS: 0
WEAKNESS: 0
APPETITE CHANGE: 0
EYE REDNESS: 0
DIFFICULTY URINATING: 0
FLANK PAIN: 0
TREMORS: 0
ABDOMINAL PAIN: 0
EYE PAIN: 0
BRUISES/BLEEDS EASILY: 0
LIGHT-HEADEDNESS: 0
CHEST TIGHTNESS: 0
FEVER: 0
HEMATURIA: 0
SPEECH DIFFICULTY: 0
SINUS PAIN: 0
BACK PAIN: 0
WHEEZING: 0
NUMBNESS: 0
CONSTIPATION: 0
RHINORRHEA: 0
NECK STIFFNESS: 0
DYSURIA: 0
UNEXPECTED WEIGHT CHANGE: 0
SORE THROAT: 0
CHILLS: 0
COUGH: 0
FATIGUE: 0
SINUS PRESSURE: 0
FREQUENCY: 0

## 2018-12-07 ASSESSMENT — PATIENT HEALTH QUESTIONNAIRE - PHQ9: SUM OF ALL RESPONSES TO PHQ QUESTIONS 1-9: 0

## 2018-12-07 NOTE — PROGRESS NOTES
Chief Complaint:  This patient is here for a comprehensive review of their multiple medical problems, renewal of medications and update on necessary health maintenance issues.      HPI: This patient is here today for complete evaluation and review of his chronic medical problems.  He has a history of lower urinary tract symptoms.  He takes finasteride as well as tamsulosin and with that combination actually does quite well in regards to urination.  He knows that he probably does not completely empty but for the time being, he does not feel that anything further needs to be done.    The patient has a history of hypertension.  We had him on ACE inhibitors in the past but this caused him to have significant congestion.  Since being off the ACE inhibitor he is improved.  He is only on the hydrochlorothiazide at the present time with good control of his blood pressure and no endorgan disease.    He has some osteoarthritis.  He will take ibuprofen as well as glucosamine as needed to help control this.  He continues to take Claritin on a daily basis mainly in the summer for allergy symptoms and that seems to work quite well.  He has a history of some mild hyperglycemia but his last metabolic panel showed a normal glucose.    He is having a little bit of rectal itch.  He wonders if that is because his bowel habits have increased somewhat.  He is also got a little bit of a lump in the groin area that he would like checked.    Medications are reconciled.  Past medical history, past surgical history, family history and social histories are reviewed and updated.  Immunizations are up-to-date other than the ShingRx which she is not interested in.    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     Calculus of kidney     1977, 1989,Reoccured     Enlarged prostate with lower urinary tract symptoms (LUTS)     No Comments  Provided     Essential (primary) hypertension     No Comments Provided     Osteoarthritis     No Comments Provided     Personal history of other malignant neoplasm of skin (CODE)     2010     Zoster without complications     2009       Past Surgical History:   Procedure Laterality Date     ARTHROSCOPY SHOULDER Right 2013     COLONOSCOPY      2006,Repeat in 2011     COLONOSCOPY      2011,F/U 2021, if appropriate     EXTRACAPSULAR CATARACT EXTRATION WITH INTRAOCULAR LENS IMPLANT Bilateral      FRACTURE SURGERY      1977,Compression fracture of right tibia. Skiing accident, surgical repair     HERNIA REPAIR, INGUINAL RT/LT Left 2013    with mesh     HERNIA REPAIR, INGUINAL RT/LT Right 2014     SIGMOIDOSCOPY FLEXIBLE      1998,Normal       Current Outpatient Prescriptions   Medication Sig Dispense Refill     ASPIRIN 81 PO Take 81 mg by mouth daily       finasteride (PROSCAR) 5 MG tablet Take 5 mg by mouth daily       Glucosamine Sulfate 500 MG TABS Take 1,000 mg by mouth daily       hydrochlorothiazide (HYDRODIURIL) 25 MG tablet Take 25 mg by mouth daily       ibuprofen (ADVIL/MOTRIN) 200 MG tablet Take 200 mg by mouth daily       loratadine (CLARITIN) 10 MG tablet Take 10 mg by mouth daily as needed       Multiple Vitamin (MULTI-VITAMINS) TABS Take 1 tablet by mouth daily       tamsulosin (FLOMAX) 0.4 MG capsule TAKE 1 CAPSULE BY MOUTH EVERY DAY AFTER A MEAL 90 capsule 1       Allergies   Allergen Reactions     Ace Inhibitors Cough     Iodine Hives       Family History   Problem Relation Age of Onset     Other - See Comments Father      Stroke, 94     Other - See Comments Mother      Old age     HIV/AIDS Brother      HIV     Other - See Comments Brother      While getting pacemaker     Cancer Sister      Cancer,In bones--left femur       Social History     Social History     Marital status:      Spouse name: N/A     Number of children: N/A     Years of education: N/A     Occupational History     Not on file.      Social History Main Topics     Smoking status: Former Smoker     Types: Cigarettes     Quit date: 6/1/1954     Smokeless tobacco: Never Used     Alcohol use 2.5 oz/week      Comment: Occasional     Drug use: No      Comment: Drug use: No     Sexual activity: Not on file     Other Topics Concern     Not on file     Social History Narrative    , four children, retired from the [employer]  Lives in town.       Review of Systems   Constitutional: Negative for appetite change, chills, diaphoresis, fatigue, fever and unexpected weight change.   HENT: Negative for ear pain, rhinorrhea, sinus pain, sinus pressure, sore throat, trouble swallowing and voice change.    Eyes: Negative for pain, redness and visual disturbance.   Respiratory: Negative for cough, chest tightness, shortness of breath and wheezing.    Cardiovascular: Negative for chest pain, palpitations and leg swelling.   Gastrointestinal: Negative for abdominal distention, abdominal pain, blood in stool, constipation, diarrhea, nausea and vomiting.   Endocrine: Negative for cold intolerance and heat intolerance.   Genitourinary: Positive for decreased urine volume. Negative for difficulty urinating, dysuria, flank pain, frequency and hematuria.        Nodule in groin, rectal itch   Musculoskeletal: Negative for back pain, joint swelling, neck pain and neck stiffness.   Skin: Negative for rash and wound.   Allergic/Immunologic: Negative for immunocompromised state.   Neurological: Negative for dizziness, tremors, seizures, syncope, speech difficulty, weakness, light-headedness, numbness and headaches.   Hematological: Negative for adenopathy. Does not bruise/bleed easily.   Psychiatric/Behavioral: Negative for agitation, behavioral problems, confusion, hallucinations and sleep disturbance. The patient is not nervous/anxious.        Physical Exam   Constitutional: He is oriented to person, place, and time. He appears well-developed and  well-nourished. No distress.   HENT:   Head: Normocephalic.   Right Ear: External ear normal.   Left Ear: External ear normal.   Nose: Nose normal.   Mouth/Throat: Oropharynx is clear and moist.   Eyes: Conjunctivae are normal. Pupils are equal, round, and reactive to light.   Neck: Normal range of motion. Neck supple. Normal carotid pulses and no JVD present. Carotid bruit is not present. No tracheal deviation present. No thyromegaly present.   Cardiovascular: Normal rate and regular rhythm.  Exam reveals no gallop and no friction rub.    No murmur heard.  Pulmonary/Chest: Effort normal and breath sounds normal. No respiratory distress. He has no wheezes. He has no rales.   Abdominal: Soft. Bowel sounds are normal. He exhibits no distension and no mass. There is no tenderness. There is no rebound and no guarding. No hernia.   Genitourinary: Rectum normal and penis normal. Prostate is enlarged.         Genitourinary Comments: Prostate 2-3+, no significant nodularity.  No rectal abnormalities.   Musculoskeletal: Normal range of motion. He exhibits no edema.   Lymphadenopathy:     He has no cervical adenopathy.   Neurological: He is alert and oriented to person, place, and time. He has normal reflexes. No cranial nerve deficit. He exhibits normal muscle tone. Coordination normal.   Skin: Skin is warm and dry. No rash noted. He is not diaphoretic.   Psychiatric: He has a normal mood and affect. His behavior is normal.   Nursing note and vitals reviewed.      Assessment:      ICD-10-CM    1. Essential hypertension I10    2. Hyperglycemia R73.9    3. Lower urinary tract symptoms (LUTS) R39.9    4. Chronic maxillary sinusitis J32.0    5. Primary osteoarthritis, unspecified site M19.91    6. Rectal irritation K62.89    7. Sebaceous cyst L72.3         Plan: His exam today is actually pretty unremarkable and stable.  The sebaceous cyst was actually draining somewhat, he was reassured.  I think the rectal irritation can be  resolved just with some medicated wipes or possibly a little bit of cortisone if necessary.  Everything else looks fine.  Medications will continue without change.  Complete lab drawn and pending, I will send him a letter with the results.  Assuming all goes well, he will follow-up with me on an annual basis.

## 2018-12-07 NOTE — MR AVS SNAPSHOT
"              After Visit Summary   12/7/2018    Jeffrey Cespedes    MRN: [id number]           Patient Information     Date Of Birth          5/3/1932        Visit Information        Provider Department      12/7/2018 11:00 AM Cody Souza MD [facility]        Today's Diagnoses     Essential hypertension    -  1    Hyperglycemia        Lower urinary tract symptoms (LUTS)        Chronic maxillary sinusitis        Primary osteoarthritis, unspecified site        Rectal irritation        Sebaceous cyst           Follow-ups after your visit        Future tests that were ordered for you today     Open Future Orders        Priority Expected Expires Ordered    Comprehensive Metabolic Panel Routine  12/7/2019 12/7/2018    Lipid Panel Routine  12/7/2019 12/7/2018            Who to contact     If you have questions or need follow up information about today's clinic visit or your schedule please contact [facility] directly at [phone].  Normal or non-critical lab and imaging results will be communicated to you by [employer], letter or phone within 4 business days after the clinic has received the results. If you do not hear from us within 7 days, please contact the clinic through [employer] or phone. If you have a critical or abnormal lab result, we will notify you by phone as soon as possible.  Submit refill requests through [employer] or call your pharmacy and they will forward the refill request to us. Please allow 3 business days for your refill to be completed.          Additional Information About Your Visit        MyChart Information     [employer] lets you send messages to your doctor, view your test results, renew your prescriptions, schedule appointments and more. To sign up, go to [email or web address] . Click on \"Log in\" on the left side of the screen, which will take you to the Welcome page. Then click on \"Sign up Now\" on the right side of the page.     You will be asked to " "enter the access code listed below, as well as some personal information. Please follow the directions to create your username and password.     Your access code is: [email or web address]  Expires: 2019 12:24 PM     Your access code will  in 90 days. If you need help or a new code, please call your [facility] clinic or [phone].        Care EveryWhere ID     This is your Care EveryWhere ID. This could be used by other organizations to access your [facility] medical records  [id number]        Your Vitals Were     Pulse Respirations Height BMI (Body Mass Index)          72 16 5' 8\" (1.727 m) 25.94 kg/m2         Blood Pressure from Last 3 Encounters:   18 118/80   18 118/70   18 104/62    Weight from Last 3 Encounters:   18 170 lb 9.6 oz (77.4 kg)   18 175 lb (79.4 kg)   18 169 lb 12.8 oz (77 kg)                 Today's Medication Changes          These changes are accurate as of 18 11:27 AM.  If you have any questions, ask your nurse or doctor.               These medicines have changed or have updated prescriptions.        Dose/Directions    tamsulosin 0.4 MG capsule   Commonly known as:  FLOMAX   This may have changed:  See the new instructions.   Used for:  Lower urinary tract symptoms (LUTS)   Changed by:  Cody Souza MD        Dose:  0.4 mg   Take 1 capsule (0.4 mg) by mouth daily   Quantity:  90 capsule   Refills:  3            Where to get your medicines      These medications were sent to [id number] - [place], [place] - [address]  [address], [facility] [address]     Phone:  [phone]     finasteride 5 MG tablet    hydrochlorothiazide 25 MG tablet    tamsulosin 0.4 MG capsule                Primary Care Provider Office Phone # Fax #    Cody Souza [phone] [address] [facility] [address]        Equal Access to Services     LEANDER PELLETIER AH: Adela sales " Fior, waalexda luqadaha, qaybta kaalkwame henley, jerrell rupeshin hayaan shahramsammy jagdarlene laPippaadolfo queenie. [facility] [phone].    ATENCIÓN: Si diogo delcid, tiene a ramos disposición servicios gratuitos de asistencia lingüística. Devin al [phone].    We comply with applicable federal civil rights laws and [place] laws. We do not discriminate on the basis of race, color, national origin, age, disability, sex, sexual orientation, or gender identity.            Thank you!     Thank you for choosing [facility] AND [facility]  for your care. Our goal is always to provide you with excellent care. Hearing back from our patients is one way we can continue to improve our services. Please take a few minutes to complete the written survey that you may receive in the mail after your visit with us. Thank you!             Your Updated Medication List - Protect others around you: Learn how to safely use, store and throw away your medicines at www.disposemymeds.org.          This list is accurate as of 12/7/18 11:27 AM.  Always use your most recent med list.                   Brand Name Dispense Instructions for use Diagnosis    ASPIRIN 81 PO      Take 81 mg by mouth daily        finasteride 5 MG tablet    PROSCAR    90 tablet    Take 1 tablet (5 mg) by mouth daily    Lower urinary tract symptoms (LUTS)       Glucosamine Sulfate 500 MG Tabs      Take 1,000 mg by mouth daily        hydrochlorothiazide 25 MG tablet    HYDRODIURIL    90 tablet    Take 1 tablet (25 mg) by mouth daily    Essential hypertension       ibuprofen 200 MG tablet    ADVIL/MOTRIN     Take 200 mg by mouth daily        loratadine 10 MG tablet    CLARITIN     Take 10 mg by mouth daily as needed        MULTI-VITAMINS Tabs      Take 1 tablet by mouth daily        tamsulosin 0.4 MG capsule    FLOMAX    90 capsule    Take 1 capsule (0.4 mg) by mouth daily    Lower urinary tract symptoms (LUTS)

## 2018-12-07 NOTE — NURSING NOTE
"The patient is here today to be seen for a refill on his medications.  Maegan Payne LPN on 12/7/2018 at 10:56 AM  Chief Complaint   Patient presents with     Recheck Medication       Initial /80 (BP Location: Right arm, Patient Position: Sitting, Cuff Size: Adult Regular)  Pulse 72  Resp 16  Ht 5' 8\" (1.727 m)  Wt 170 lb 9.6 oz (77.4 kg)  BMI 25.94 kg/m2 Estimated body mass index is 25.94 kg/(m^2) as calculated from the following:    Height as of this encounter: 5' 8\" (1.727 m).    Weight as of this encounter: 170 lb 9.6 oz (77.4 kg).  Medication Reconciliation: complete    Maegan Payne LPN    "

## 2018-12-07 NOTE — LETTER
December 7, 2018      Jeffrey Cespedes  [address] [facility] [address]        Dear Jeffrey Cespedes,    Below are the results of your recent labs:    Results for orders placed or performed in visit on 12/07/18   Comprehensive Metabolic Panel   Result Value Ref Range    Sodium 138 134 - 144 mmol/L    Potassium 3.7 3.5 - 5.1 mmol/L    Chloride 104 98 - 107 mmol/L    Carbon Dioxide 25 21 - 31 mmol/L    Anion Gap 9 3 - 14 mmol/L    Glucose 126 (H) 70 - 105 mg/dL    Urea Nitrogen 18 7 - 25 mg/dL    Creatinine 0.80 0.70 - 1.30 mg/dL    GFR Estimate >90 >60 mL/min/1.7m2    GFR Estimate If Black >90 >60 mL/min/1.7m2    Calcium 9.0 8.6 - 10.3 mg/dL    Bilirubin Total 1.2 (H) 0.3 - 1.0 mg/dL    Albumin 4.2 3.5 - 5.7 g/dL    Protein Total 6.7 6.4 - 8.9 g/dL    Alkaline Phosphatase 62 34 - 104 U/L    ALT 15 7 - 52 U/L    AST 14 13 - 39 U/L   Lipid Panel   Result Value Ref Range    Cholesterol 118 <200 mg/dL    Triglycerides 54 <150 mg/dL    HDL Cholesterol 53 23 - 92 mg/dL    LDL Cholesterol Calculated 54 <100 mg/dL    Non HDL Cholesterol 65 <130 mg/dL        The only abnormality of any significance is the fact that your glucose or diabetes test is somewhat high at 126.  We have seen elevation for this in the past.  Make sure that you follow a healthy diet.  Assuming all goes well, I think you should return in 3 months to have this diabetes test rechecked to make sure that you do not develop diabetes.    Sincerely,        Cody Souza MD  Internal Medicine  [facility]

## 2018-12-29 DIAGNOSIS — I10 ESSENTIAL HYPERTENSION: ICD-10-CM

## 2019-01-03 RX ORDER — HYDROCHLOROTHIAZIDE 25 MG/1
TABLET ORAL
Qty: 90 TABLET | Refills: 0 | OUTPATIENT
Start: 2019-01-03

## 2019-01-03 NOTE — TELEPHONE ENCOUNTER
Refused, filled 12-7-18 for #90 X 3 refills. Has refills left. Marivel Venegas RN on 1/3/2019 at 10:10 AM

## 2019-01-17 DIAGNOSIS — R39.9 LOWER URINARY TRACT SYMPTOMS (LUTS): ICD-10-CM

## 2019-01-18 RX ORDER — FINASTERIDE 5 MG/1
TABLET, FILM COATED ORAL
Qty: 90 TABLET | Refills: 0 | OUTPATIENT
Start: 2019-01-18

## 2019-01-18 NOTE — TELEPHONE ENCOUNTER
Filled 12-7-18 for #90 X 3 refills. Trevor confirmed receipt. Marivel Venegas RN on 1/18/2019 at 9:17 AM

## 2019-01-29 ENCOUNTER — OFFICE VISIT (OUTPATIENT)
Dept: INTERNAL MEDICINE | Facility: OTHER | Age: 84
End: 2019-01-29
Attending: INTERNAL MEDICINE
Payer: MEDICARE

## 2019-01-29 VITALS
HEART RATE: 84 BPM | RESPIRATION RATE: 20 BRPM | DIASTOLIC BLOOD PRESSURE: 76 MMHG | BODY MASS INDEX: 26.06 KG/M2 | SYSTOLIC BLOOD PRESSURE: 124 MMHG | WEIGHT: 171.4 LBS

## 2019-01-29 DIAGNOSIS — R73.9 HYPERGLYCEMIA: ICD-10-CM

## 2019-01-29 DIAGNOSIS — R00.0 TACHYCARDIA: Primary | ICD-10-CM

## 2019-01-29 LAB
ANION GAP SERPL CALCULATED.3IONS-SCNC: 8 MMOL/L (ref 3–14)
BUN SERPL-MCNC: 22 MG/DL (ref 7–25)
CALCIUM SERPL-MCNC: 9.6 MG/DL (ref 8.6–10.3)
CHLORIDE SERPL-SCNC: 104 MMOL/L (ref 98–107)
CO2 SERPL-SCNC: 27 MMOL/L (ref 21–31)
CREAT SERPL-MCNC: 0.8 MG/DL (ref 0.7–1.3)
GFR SERPL CREATININE-BSD FRML MDRD: >90 ML/MIN/{1.73_M2}
GLUCOSE SERPL-MCNC: 101 MG/DL (ref 70–105)
HBA1C MFR BLD: 5.2 % (ref 4–6)
POTASSIUM SERPL-SCNC: 3.8 MMOL/L (ref 3.5–5.1)
SODIUM SERPL-SCNC: 139 MMOL/L (ref 134–144)
TSH SERPL DL<=0.05 MIU/L-ACNC: 2.72 IU/ML (ref 0.34–5.6)

## 2019-01-29 PROCEDURE — 80048 BASIC METABOLIC PNL TOTAL CA: CPT | Performed by: INTERNAL MEDICINE

## 2019-01-29 PROCEDURE — 84443 ASSAY THYROID STIM HORMONE: CPT | Performed by: INTERNAL MEDICINE

## 2019-01-29 PROCEDURE — 36415 COLL VENOUS BLD VENIPUNCTURE: CPT | Performed by: INTERNAL MEDICINE

## 2019-01-29 PROCEDURE — G0463 HOSPITAL OUTPT CLINIC VISIT: HCPCS | Mod: 25

## 2019-01-29 PROCEDURE — 83036 HEMOGLOBIN GLYCOSYLATED A1C: CPT | Performed by: INTERNAL MEDICINE

## 2019-01-29 PROCEDURE — 93005 ELECTROCARDIOGRAM TRACING: CPT | Performed by: INTERNAL MEDICINE

## 2019-01-29 PROCEDURE — 93010 ELECTROCARDIOGRAM REPORT: CPT | Performed by: INTERNAL MEDICINE

## 2019-01-29 PROCEDURE — 99214 OFFICE O/P EST MOD 30 MIN: CPT | Performed by: INTERNAL MEDICINE

## 2019-01-29 ASSESSMENT — ENCOUNTER SYMPTOMS
HEMATOLOGIC/LYMPHATIC NEGATIVE: 1
ENDOCRINE NEGATIVE: 1
CONSTITUTIONAL NEGATIVE: 1
ALLERGIC/IMMUNOLOGIC NEGATIVE: 1

## 2019-01-29 ASSESSMENT — PATIENT HEALTH QUESTIONNAIRE - PHQ9: SUM OF ALL RESPONSES TO PHQ QUESTIONS 1-9: 0

## 2019-01-29 NOTE — LETTER
February 15, 2019        Jeffrey Cespedes  [address] [facility] [address]        Dear Jeffrey Cespedes,    Your recent heart echocardiogram has returned and is completely normal.  Congratulations on this report.  I will let you know the results of your heart monitor once that returns.    Sincerely,        Cody Souza MD  Internal Medicine  [facility]

## 2019-01-29 NOTE — NURSING NOTE
"The patient is here today to be seen for a rapid heart rate.  Maegan Payne LPN on 1/29/2019 at 12:56 PM  Chief Complaint   Patient presents with     Tachycardia       Initial /76 (BP Location: Right arm, Patient Position: Sitting, Cuff Size: Adult Regular)   Pulse 84   Resp 20   Wt 77.7 kg (171 lb 6.4 oz)   BMI 26.06 kg/m   Estimated body mass index is 26.06 kg/m  as calculated from the following:    Height as of 12/7/18: 1.727 m (5' 8\").    Weight as of this encounter: 77.7 kg (171 lb 6.4 oz).  Medication Reconciliation: complete    Maegan Payne LPN    "

## 2019-01-29 NOTE — LETTER
January 29, 2019      Jeffrey Cespedes  [address] [facility] [address]        Dear Jeffrey Cespedes,    Below are the results of your recent labs:    Results for orders placed or performed in visit on 01/29/19   Hemoglobin A1c   Result Value Ref Range    Hemoglobin A1C 5.2 4.0 - 6.0 %   Basic Metabolic Panel   Result Value Ref Range    Sodium 139 134 - 144 mmol/L    Potassium 3.8 3.5 - 5.1 mmol/L    Chloride 104 98 - 107 mmol/L    Carbon Dioxide 27 21 - 31 mmol/L    Anion Gap 8 3 - 14 mmol/L    Glucose 101 70 - 105 mg/dL    Urea Nitrogen 22 7 - 25 mg/dL    Creatinine 0.80 0.70 - 1.30 mg/dL    GFR Estimate >90 >60 mL/min/[1.73_m2]    GFR Estimate If Black >90 >60 mL/min/[1.73_m2]    Calcium 9.6 8.6 - 10.3 mg/dL   TSH   Result Value Ref Range    Thyrotropin 2.72 0.34 - 5.60 IU/mL   EKG [facility] READ    Impression    Sinus rhythm with a rate of 89.  Axis is normal.  TX and QRS intervals normal.  Intermittent QRS aberrancy.  No other abnormalities.  Previous are not available for comparison.  Cody Souza MD          Your blood tests are normal.  Congratulations on this report.    Sincerely,        Cody Souza MD  Internal Medicine  [facility]

## 2019-01-29 NOTE — PROGRESS NOTES
Chief Complaint:  Fast heart beat.    HPI: This patient comes in today with a complaint of a fast heartbeat.  He is noticed it for the past 2 weeks or so but he has not noticed it since about noon yesterday.  He would find that his heart was beating about 135 bpm.  While this was happening, he felt a little bit of heaviness in his chest.  He would verify it by putting on his blood pressure cuff.  His blood pressure was always doing fine during these times.  This seemed to come and go throughout the day.  He tried cutting back on caffeine and alcohol, neither of which T used heavily.  This did not seem to make any difference at all.  His other medications remain the same.  He admits that he has had a little bit more stress and anxiety lately.  He does not have any previous cardiac history.  He does have hypertension and is on hydrochlorothiazide for control of that.  He is otherwise quite healthy.    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     Calculus of kidney     1977, 1989,Reoccured     Enlarged prostate with lower urinary tract symptoms (LUTS)     No Comments Provided     Essential (primary) hypertension     No Comments Provided     Osteoarthritis     No Comments Provided     Personal history of other malignant neoplasm of skin (CODE)     2010     Zoster without complications     2009       Past Surgical History:   Procedure Laterality Date     ARTHROSCOPY SHOULDER Right 2013     COLONOSCOPY      2006,Repeat in 2011     COLONOSCOPY      2011,F/U 2021, if appropriate     EXTRACAPSULAR CATARACT EXTRATION WITH INTRAOCULAR LENS IMPLANT Bilateral      FRACTURE SURGERY      1977,Compression fracture of right tibia. Skiing accident, surgical repair     HERNIA REPAIR, INGUINAL RT/LT Left 2013    with mesh     HERNIA REPAIR, INGUINAL RT/LT Right 2014     SIGMOIDOSCOPY FLEXIBLE      1998,Normal       Allergies    Allergen Reactions     Ace Inhibitors Cough     Iodine Hives       Current Outpatient Medications   Medication Sig Dispense Refill     ASPIRIN 81 PO Take 81 mg by mouth daily       finasteride (PROSCAR) 5 MG tablet Take 1 tablet (5 mg) by mouth daily 90 tablet 3     Glucosamine Sulfate 500 MG TABS Take 1,000 mg by mouth daily       hydrochlorothiazide (HYDRODIURIL) 25 MG tablet Take 1 tablet (25 mg) by mouth daily 90 tablet 3     ibuprofen (ADVIL/MOTRIN) 200 MG tablet Take 200 mg by mouth daily       loratadine (CLARITIN) 10 MG tablet Take 10 mg by mouth daily as needed       Multiple Vitamin (MULTI-VITAMINS) TABS Take 1 tablet by mouth daily       tamsulosin (FLOMAX) 0.4 MG capsule Take 1 capsule (0.4 mg) by mouth daily 90 capsule 3       Social History     Socioeconomic History     Marital status:      Spouse name: Not on file     Number of children: Not on file     Years of education: Not on file     Highest education level: Not on file   Social Needs     Financial resource strain: Not on file     Food insecurity - worry: Not on file     Food insecurity - inability: Not on file     Transportation needs - medical: Not on file     Transportation needs - non-medical: Not on file   Occupational History     Not on file   Tobacco Use     Smoking status: Former Smoker     Types: Cigarettes     Last attempt to quit: 1954     Years since quittin.7     Smokeless tobacco: Never Used   Substance and Sexual Activity     Alcohol use: Yes     Alcohol/week: 2.5 oz     Comment: Occasional     Drug use: No     Comment: Drug use: No     Sexual activity: Not on file   Other Topics Concern     Parent/sibling w/ CABG, MI or angioplasty before 65F 55M? Not Asked   Social History Narrative    , four children, retired from the [employer] business/[employer].  Lives in town.       Review of Systems   Constitutional: Negative.    Endocrine: Negative.    Skin: Negative.    Allergic/Immunologic: Negative.    Hematological:  Negative.        Physical Exam   Constitutional: He is oriented to person, place, and time. He appears well-developed and well-nourished. No distress.   Neck: No JVD present.   Cardiovascular: Normal rate and regular rhythm.   2 out of 6 to 3 out of 6 aortic murmur with question slight regurgitant murmur   Pulmonary/Chest: Effort normal and breath sounds normal. No stridor. No respiratory distress. He has no wheezes. He has no rales.   Musculoskeletal: He exhibits no edema.   Neurological: He is alert and oriented to person, place, and time.   Skin: Skin is warm and dry. He is not diaphoretic.   Nursing note and vitals reviewed.      Assessment:      ICD-10-CM    1. Tachycardia R00.0 EKG 12-LEAD CLINIC READ     Echocardiogram Complete     Zio Patch Holter Adult Pediatric Greater than 48 hrs     TSH     Basic Metabolic Panel   2. Hyperglycemia R73.9 Hemoglobin A1c       Plan: His exam today shows somewhat of a heart murmur but this does not seem to be significant.  EKG showed sinus rhythm with some aberrancy.  He appears to be having some paroxysmal tachycardia, question SVT versus A. fib.  Further investigation is warranted.  Lab is pending including an A1c because of his previous hyperglycemia, I will let him know the results.  He will be scheduled for an echocardiogram as well as a Zio patch monitor.  Once I get the results of his tests we will proceed as indicated.  This could include an ischemic evaluation although I am not totally convinced that any of his symptoms are related to ischemia at this time.  More likely this will be just a dysrhythmia evaluation and treatment.

## 2019-02-14 ENCOUNTER — HOSPITAL ENCOUNTER (OUTPATIENT)
Dept: CARDIOLOGY | Facility: OTHER | Age: 84
Discharge: HOME | End: 2019-02-14
Attending: INTERNAL MEDICINE | Admitting: INTERNAL MEDICINE
Payer: MEDICARE

## 2019-02-14 ENCOUNTER — HOSPITAL ENCOUNTER (OUTPATIENT)
Dept: CARDIOLOGY | Facility: OTHER | Age: 84
End: 2019-02-14
Attending: INTERNAL MEDICINE
Payer: MEDICARE

## 2019-02-14 DIAGNOSIS — R00.0 TACHYCARDIA: ICD-10-CM

## 2019-02-14 PROCEDURE — 93306 TTE W/DOPPLER COMPLETE: CPT | Mod: 26 | Performed by: INTERNAL MEDICINE

## 2019-02-14 PROCEDURE — 0296T ZIO PATCH HOLTER ADULT PEDIATRIC GREATER THAN 48 HRS: CPT

## 2019-02-14 PROCEDURE — 93306 TTE W/DOPPLER COMPLETE: CPT

## 2019-02-14 PROCEDURE — 0298T ZIO PATCH HOLTER ADULT PEDIATRIC GREATER THAN 48 HRS: CPT | Performed by: INTERNAL MEDICINE

## 2019-02-14 NOTE — PATIENT INSTRUCTIONS
Patient instructed not to:   -take baths, swim, sauna   -remove device prior to 14 days   -use electric blankets   -shower or sweat excessively within first 24 hours of device application  Patient instructed to:   -shower as needed   -be carefull when toweling off and dressing   -press button when cardiac symptoms occur   -document symptoms in log book   -remove and return device (send via mail to [facility])   -Call [employer] with any questions

## 2019-03-15 ENCOUNTER — OFFICE VISIT (OUTPATIENT)
Dept: INTERNAL MEDICINE | Facility: OTHER | Age: 84
End: 2019-03-15
Attending: INTERNAL MEDICINE
Payer: COMMERCIAL

## 2019-03-15 VITALS
RESPIRATION RATE: 18 BRPM | HEART RATE: 100 BPM | BODY MASS INDEX: 25.97 KG/M2 | DIASTOLIC BLOOD PRESSURE: 76 MMHG | SYSTOLIC BLOOD PRESSURE: 118 MMHG | TEMPERATURE: 97.3 F | WEIGHT: 170.8 LBS

## 2019-03-15 DIAGNOSIS — I47.10 PAROXYSMAL SUPRAVENTRICULAR TACHYCARDIA (H): Primary | ICD-10-CM

## 2019-03-15 PROCEDURE — 99213 OFFICE O/P EST LOW 20 MIN: CPT | Performed by: INTERNAL MEDICINE

## 2019-03-15 PROCEDURE — G0463 HOSPITAL OUTPT CLINIC VISIT: HCPCS

## 2019-03-15 ASSESSMENT — ENCOUNTER SYMPTOMS
EYES NEGATIVE: 1
CONSTITUTIONAL NEGATIVE: 1
ENDOCRINE NEGATIVE: 1
ALLERGIC/IMMUNOLOGIC NEGATIVE: 1

## 2019-03-15 ASSESSMENT — PATIENT HEALTH QUESTIONNAIRE - PHQ9: SUM OF ALL RESPONSES TO PHQ QUESTIONS 1-9: 0

## 2019-03-15 NOTE — NURSING NOTE
"The patient is here today to have a follow up on a recent heart monitor.  Maegan Payne LPN on 3/15/2019 at 1:40 PM  Chief Complaint   Patient presents with     RECHECK       Initial /76 (BP Location: Right arm, Patient Position: Sitting, Cuff Size: Adult Regular)   Pulse 100   Temp 97.3  F (36.3  C) (Tympanic)   Resp 18   Wt 77.5 kg (170 lb 12.8 oz)   BMI 25.97 kg/m   Estimated body mass index is 25.97 kg/m  as calculated from the following:    Height as of 12/7/18: 1.727 m (5' 8\").    Weight as of this encounter: 77.5 kg (170 lb 12.8 oz).  Medication Reconciliation: complete    Maegan Payne LPN    "

## 2019-03-15 NOTE — PROGRESS NOTES
Chief Complaint: Follow-up on heart monitor.    HPI: He comes in today for a follow-up on his Zio patch monitor.  He had told me that he was noticing that he was having a rapid heart rate that was sustained.  Because of that, we elected to get a Zio patch monitor.  This showed numerous episodes of nonsustained and sustained supraventricular tachycardia as well as one nonsustained run of ventricular tachycardia.  The patient did noticed some of the episodes.  The longest reported episode was 13 hours.  No other significant abnormalities were seen on the monitor.    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     Calculus of kidney     1977, 1989,Reoccured     Enlarged prostate with lower urinary tract symptoms (LUTS)     No Comments Provided     Essential (primary) hypertension     No Comments Provided     Osteoarthritis     No Comments Provided     Personal history of other malignant neoplasm of skin (CODE)     2010     Zoster without complications     2009       Past Surgical History:   Procedure Laterality Date     ARTHROSCOPY SHOULDER Right 2013     COLONOSCOPY      2006,Repeat in 2011     COLONOSCOPY      2011,F/U 2021, if appropriate     EXTRACAPSULAR CATARACT EXTRATION WITH INTRAOCULAR LENS IMPLANT Bilateral      FRACTURE SURGERY      1977,Compression fracture of right tibia. Skiing accident, surgical repair     HERNIA REPAIR, INGUINAL RT/LT Left 2013    with mesh     HERNIA REPAIR, INGUINAL RT/LT Right 2014     SIGMOIDOSCOPY FLEXIBLE      1998,Normal       Allergies   Allergen Reactions     Ace Inhibitors Cough     Iodine Hives       Current Outpatient Medications   Medication Sig Dispense Refill     ASPIRIN 81 PO Take 81 mg by mouth daily       diltiazem ER COATED BEADS (CARDIAZEM LA) 180 MG 24 hr tablet Take 1 tablet (180 mg) by mouth daily 30 tablet 3     finasteride (PROSCAR) 5 MG tablet Take 1  tablet (5 mg) by mouth daily 90 tablet 3     Glucosamine Sulfate 500 MG TABS Take 1,000 mg by mouth daily       hydrochlorothiazide (HYDRODIURIL) 25 MG tablet Take 1 tablet (25 mg) by mouth daily 90 tablet 3     ibuprofen (ADVIL/MOTRIN) 200 MG tablet Take 200 mg by mouth daily       loratadine (CLARITIN) 10 MG tablet Take 10 mg by mouth daily as needed       Multiple Vitamin (MULTI-VITAMINS) TABS Take 1 tablet by mouth daily       tamsulosin (FLOMAX) 0.4 MG capsule Take 1 capsule (0.4 mg) by mouth daily 90 capsule 3       Social History     Socioeconomic History     Marital status:      Spouse name: Not on file     Number of children: Not on file     Years of education: Not on file     Highest education level: Not on file   Occupational History     Not on file   Social Needs     Financial resource strain: Not on file     Food insecurity:     Worry: Not on file     Inability: Not on file     Transportation needs:     Medical: Not on file     Non-medical: Not on file   Tobacco Use     Smoking status: Former Smoker     Types: Cigarettes     Last attempt to quit: 1954     Years since quittin.8     Smokeless tobacco: Never Used   Substance and Sexual Activity     Alcohol use: Yes     Alcohol/week: 2.5 oz     Comment: Occasional     Drug use: No     Comment: Drug use: No     Sexual activity: Not on file   Lifestyle     Physical activity:     Days per week: Not on file     Minutes per session: Not on file     Stress: Not on file   Relationships     Social connections:     Talks on phone: Not on file     Gets together: Not on file     Attends Buddhism service: Not on file     Active member of club or organization: Not on file     Attends meetings of clubs or organizations: Not on file     Relationship status: Not on file     Intimate partner violence:     Fear of current or ex partner: Not on file     Emotionally abused: Not on file     Physically abused: Not on file     Forced sexual activity: Not on file    Other Topics Concern     Parent/sibling w/ CABG, MI or angioplasty before 65F 55M? Not Asked   Social History Narrative    , four children, retired from the [employer] business/[employer].  Lives in town.       Review of Systems   Constitutional: Negative.    Eyes: Negative.    Endocrine: Negative.    Allergic/Immunologic: Negative.        Physical Exam   Constitutional: He appears well-developed and well-nourished. No distress.   Skin: He is not diaphoretic.   Nursing note and vitals reviewed.      Assessment:      ICD-10-CM    1. Paroxysmal supraventricular tachycardia (H) I47.1 CARDIOLOGY EVAL ADULT REFERRAL     diltiazem ER COATED BEADS (CARDIAZEM LA) 180 MG 24 hr tablet       Plan: I reviewed the situation with the patient.  He is started on diltiazem 180 mg daily.  Warned of possible side effects.  I will also get him a consultation with cardiology in [place] to review this as well as his response to therapy and decide whether any further medication or invasive testing is appropriate.

## 2019-03-27 ENCOUNTER — OFFICE VISIT (OUTPATIENT)
Dept: CARDIOLOGY | Facility: OTHER | Age: 84
End: 2019-03-27
Attending: INTERNAL MEDICINE
Payer: COMMERCIAL

## 2019-03-27 ENCOUNTER — TELEPHONE (OUTPATIENT)
Dept: CARDIOLOGY | Facility: OTHER | Age: 84
End: 2019-03-27

## 2019-03-27 VITALS
TEMPERATURE: 96.9 F | HEIGHT: 68 IN | WEIGHT: 171 LBS | DIASTOLIC BLOOD PRESSURE: 74 MMHG | HEART RATE: 84 BPM | RESPIRATION RATE: 16 BRPM | OXYGEN SATURATION: 95 % | BODY MASS INDEX: 25.91 KG/M2 | SYSTOLIC BLOOD PRESSURE: 124 MMHG

## 2019-03-27 DIAGNOSIS — I10 ESSENTIAL HYPERTENSION: ICD-10-CM

## 2019-03-27 DIAGNOSIS — I47.10 PAROXYSMAL SUPRAVENTRICULAR TACHYCARDIA (H): Primary | ICD-10-CM

## 2019-03-27 DIAGNOSIS — R00.0 RAPID RESTING HEART RATE: ICD-10-CM

## 2019-03-27 PROCEDURE — 99205 OFFICE O/P NEW HI 60 MIN: CPT | Performed by: NURSE PRACTITIONER

## 2019-03-27 PROCEDURE — G0463 HOSPITAL OUTPT CLINIC VISIT: HCPCS | Mod: 25

## 2019-03-27 PROCEDURE — 93010 ELECTROCARDIOGRAM REPORT: CPT | Performed by: INTERNAL MEDICINE

## 2019-03-27 PROCEDURE — 93005 ELECTROCARDIOGRAM TRACING: CPT | Performed by: NURSE PRACTITIONER

## 2019-03-27 PROCEDURE — G0463 HOSPITAL OUTPT CLINIC VISIT: HCPCS

## 2019-03-27 ASSESSMENT — PAIN SCALES - GENERAL: PAINLEVEL: NO PAIN (0)

## 2019-03-27 ASSESSMENT — MIFFLIN-ST. JEOR: SCORE: 1430.15

## 2019-03-27 NOTE — PROGRESS NOTES
"[facility] HEART CARE   CARDIOLOGY CONSULT     Jeffrey Cespedes   [address] [facility] [address]      Cody Souza     Chief Complaint   Patient presents with     Consult     Paroxysmal SVT        HPI:   Mr. Cespedes is an 86 year old male who presents for cardiology evaluation with sustained episodes of PSVT. Patient has a history of hyperglycemia, hypertension, osteoarthritis and chronic maxillary sinusitis.  He has no past cardiac history.    Patient was seen by his internal medicine physician on 1/29/2019 with complaints of \"fast heartbeat\".  He described a 2-week history of this.  He did describe a heaviness in his chest and associated to discuss palpitations.  He described these palpitations intermittent, coming and going throughout the day.  He had tried cutting back on caffeine and alcohol, neither of which he was noted to be using heavily.  This did not seem to make much of a difference. EKG at this recent visit showed sinus rhythm with some aberrancy.  It was noted that he appeared to be having some PSVT, question SVT versus A. fib.  It was recommended that further investigation was warranted.  Therefore, echocardiogram and ZIO patch monitor were ordered. He had no concerning symptoms of ischemia.    He does have a history of hypertension for which she has been on hydrochlorothiazide, he has otherwise been quite healthy.    Patient was seen in follow-up by internal medicine on 3/15/2019 to review the results of echocardiogram and recent cardiac monitoring.  At that time, patient reported that he was noticing his rapid heart rate was more sustained when wearing the monitor.  This monitor showed numerous episodes of nonsustained and sustained supraventricular tachycardia as well as 1 nonsustained run of VT, possible aberrancy.  The longest episode noted was 13 hours.  There is no evidence of atrial fibrillation on this monitor.  He was started on diltiazem 180 mg daily.    In review of his recent " "laboratory evaluation, thyrotropin was normal at 2.72.  Potassium was stable at 3.8.  Renal function is normal with a creatinine of 0.80.  Hemoglobin A1c was normal at 5.2%.  He recently had a lipid panel on 2018 which was at goal with a total cholesterol of 118, triglycerides 54, HDL 53 and an LDL of 54.  He is not on any lipid-lowering medications.    Today patient reports little to no symptoms. Occasional fluttery/ \"anxious\" feeling in his chest. Today he denies any chest pain or pressure. No shortness of breath or VELEZ. No lightheadedness or syncope. No edema. Admits that he has been sleeping well with no signs of apnea and no habitual snoring. Wakes feeling well rested. Reports limited caffeine intake and rare ETOH use. He describes normal energy level and no increased fatigue or weakness. Admits when he feels \"anxious\" that he will check his HR and BP on home monitor, he has noted his pulse to be in the 130's with this ant times and normal pressures. He denies any symptoms and heart rate has been normal since he was started on his Diltiazem.     IMAGING RESULTS:   [facility]  [address] [place] [address]     Name: ALFRED ROSALES  MRN: [id number]  : 1932  Study Date: 2019 01:32 PM  Age: 86 yrs  Gender: Male  Patient Location: [facility]  Reason For Study: Tachycardia  Ordering Physician: TOI SINGH  Referring Physician: TOI SINGH  Performed By: Adilia Hernandez RDCS, RVT     BSA: 1.9 m2  Height: 68 in  Weight: 171 lb  HR: 91  BP: 124/76 mmHg  _____________________________________________________________________________  __        Procedure  Echocardiogram with two-dimensional, color and spectral Doppler performed.  _____________________________________________________________________________  __        Interpretation Summary  Global and regional left ventricular function is normal with an EF of 60-65%.  Right ventricular function, chamber size, wall " motion, and thickness are  normal.  The inferior vena cava was normal in size with preserved respiratory  variability.  No pericardial effusion is present.  _____________________________________________________________________________  __        Left Ventricle  Left ventricular size is normal. Global and regional left ventricular function  is normal with an EF of 60-65%. Mild concentric wall thickening consistent  with left ventricular hypertrophy is present. Left ventricular diastolic  function is indeterminate.     Right Ventricle  Right ventricular function, chamber size, wall motion, and thickness are  normal.     Atria  Both atria appear normal.     Mitral Valve  Moderate to severe mitral annular calcification is present. Mild mitral  insufficiency is present.     Aortic Valve  The aortic valve is tricuspid. Mild to moderate aortic valve sclerosis is  present.        Tricuspid Valve  Mild tricuspid insufficiency is present. The right ventricular systolic  pressure is approximated at 28.8 mmHg plus the right atrial pressure.     Pulmonic Valve  The pulmonic valve is normal.     Vessels  The thoracic aorta is normal. The inferior vena cava was normal in size with  preserved respiratory variability.     Pericardium  No pericardial effusion is present.     Compared to Previous Study  Previous study not available for comparison.     _____________________________________________________________________________  __  MMode/2D Measurements & Calculations  IVSd: 1.2 cm  LVIDd: 5.2 cm  LVIDs: 3.6 cm  LVPWd: 1.1 cm  FS: 30.9 %  LV mass(C)d: 237.4 grams  LV mass(C)dI: 124.1 grams/m2     asc Aorta Diam: 3.4 cm  LVOT diam: 2.2 cm  LVOT area: 3.8 cm2  LA Volume (BP): 33.2 ml  LA Volume Index (BP): 17.4 ml/m2  RWT: 0.42        Doppler Measurements & Calculations  MV E max garrett: 71.6 cm/sec  MV A max garrett: 124.0 cm/sec  MV E/A: 0.58  MV dec time: 0.30 sec  Ao V2 max: 198.0 cm/sec  Ao max P.0 mmHg  Ao V2 mean: 143.0 cm/sec  Ao  mean P.0 mmHg  Ao V2 VTI: 37.5 cm  JOLYNN(I,D): 2.0 cm2  JOLYNN(V,D): 1.9 cm2     LV V1 max P.0 mmHg  LV V1 max: 99.8 cm/sec  LV V1 VTI: 20.2 cm  SV(LVOT): 76.8 ml  SI(LVOT): 40.2 ml/m2  TR max usman: 268.3 cm/sec  TR max P.8 mmHg  AV Usman Ratio (DI): 0.50  JOLYNN Index (cm2/m2): 1.1  E/E' avg: 10.4  Lateral E/e': 8.5  Medial E/e': 12.2           _____________________________________________________________________________  __           Report approved by: Gabriela Hay 2019 04:48 PM    Cardiac Event Monitor     The patient was monitored for 13 days, 7 hours.  Minimum HR was 58, average HR was 93, maximum HR was 226.  There were 3 triggered events.  During the patient triggered events, the rhythm was sinus rhythm, supraventricular tachycardia.  There were rare supraventricular ectopic beats, longest consisted of 13 hours, 4 minutes.  There were rare ventricular ectopic beats, longest consisted of 5 beats.     Impression:  Cardiac event monitor revealing supraventricular tachycardia, prolonged.     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     Calculus of kidney     , ,Reoccured     Enlarged prostate with lower urinary tract symptoms (LUTS)     No Comments Provided     Essential (primary) hypertension     No Comments Provided     Osteoarthritis     No Comments Provided     Personal history of other malignant neoplasm of skin (CODE)     2010     Zoster without complications     2009          FAMILY HISTORY:   Family History   Problem Relation Age of Onset     Other - See Comments Father         Stroke, 94     Other - See Comments Mother         Old age     HIV/AIDS Brother         HIV     Other - See Comments Brother         While getting pacemaker     Cancer Sister         Cancer,In bones--left femur          PAST SURGICAL HISTORY:    Past Surgical History:   Procedure Laterality Date     ARTHROSCOPY SHOULDER Right 2013     COLONOSCOPY      ,Repeat in      COLONOSCOPY      ,F/U , if appropriate     EXTRACAPSULAR CATARACT EXTRATION WITH INTRAOCULAR LENS IMPLANT Bilateral      FRACTURE SURGERY      ,Compression fracture of right tibia. Skiing accident, surgical repair     HERNIA REPAIR, INGUINAL RT/LT Left 2013    with mesh     HERNIA REPAIR, INGUINAL RT/LT Right 2014     SIGMOIDOSCOPY FLEXIBLE      ,Normal          SOCIAL HISTORY:   Social History     Socioeconomic History     Marital status:      Spouse name: None     Number of children: None     Years of education: None     Highest education level: None   Occupational History     None   Social Needs     Financial resource strain: None     Food insecurity:     Worry: None     Inability: None     Transportation needs:     Medical: None     Non-medical: None   Tobacco Use     Smoking status: Former Smoker     Types: Cigarettes     Last attempt to quit: 1954     Years since quittin.8     Smokeless tobacco: Never Used   Substance and Sexual Activity     Alcohol use: Yes     Alcohol/week: 2.5 oz     Comment: Occasional     Drug use: No     Comment: Drug use: No     Sexual activity: None   Lifestyle     Physical activity:     Days per week: None     Minutes per session: None     Stress: None   Relationships     Social connections:     Talks on phone: None     Gets together: None     Attends [employer] service: None     Active member of club or organization: None     Attends meetings of clubs or organizations: None     Relationship status: None     Intimate partner violence:     Fear of current or ex partner: None     Emotionally abused: None     Physically abused: None     Forced sexual activity: None   Other Topics Concern     Parent/sibling w/ CABG, MI or angioplasty before 65F 55M? Not Asked   Social History Narrative    , four children, retired from  "[employer].  Lives in town.          CURRENT MEDICATIONS:   Prior to Admission medications    Medication Sig Start Date End Date Taking? Authorizing Provider   ASPIRIN 81 PO Take 81 mg by mouth daily   Yes Reported, Patient   diltiazem ER COATED BEADS (CARDIAZEM LA) 180 MG 24 hr tablet Take 1 tablet (180 mg) by mouth daily 3/15/19  Yes Cody Souza MD   finasteride (PROSCAR) 5 MG tablet Take 1 tablet (5 mg) by mouth daily 12/7/18  Yes Cody Souza MD   Glucosamine Sulfate 500 MG TABS Take 1,000 mg by mouth daily   Yes Reported, Patient   hydrochlorothiazide (HYDRODIURIL) 25 MG tablet Take 1 tablet (25 mg) by mouth daily 12/7/18  Yes Cody Souza MD   ibuprofen (ADVIL/MOTRIN) 200 MG tablet Take 200 mg by mouth daily 9/16/13  Yes Reported, Patient   loratadine (CLARITIN) 10 MG tablet Take 10 mg by mouth daily as needed   Yes Reported, Patient   Multiple Vitamin (MULTI-VITAMINS) TABS Take 1 tablet by mouth daily   Yes Reported, Patient   tamsulosin (FLOMAX) 0.4 MG capsule Take 1 capsule (0.4 mg) by mouth daily 12/7/18  Yes Cody Souza MD          ALLERGIES:   Allergies   Allergen Reactions     Ace Inhibitors Cough     Iodine Hives          ROS:   CONSTITUTIONAL: No reported fever or chills. No changes in weight.  ENT: No visual disturbance, ear ache, epistaxis or sore throat.   CARDIOVASCULAR: No chest pain, chest pressure or chest discomfort. Occasional \"fluttering/ nervous sensation\". No lower extremity edema.   RESPIRATORY: No shortness of breath, dyspnea upon exertion, cough, wheezing or hemoptysis.   GI: No reported abdominal pain.   : No reported hematuria or dysuria.   NEUROLOGICAL: No lightheadedness, dizziness, syncope, ataxia, paresthesias or weakness.   HEMATOLOGIC: No history of anemia. No bleeding or excessive bruising. No history of blood clots.   MUSCULOSKELETAL: No reported joint pain or swelling, no muscle pain.  ENDOCRINOLOGIC: No temperature intolerance. No hair or skin " "changes.  SKIN: No abnormal rashes or sores, no unusual itching.  PSYCHIATRIC: No history of depression or anxiety. No changes in mood, feeling down or anxious. No changes in sleep.      PHYSICAL EXAM:   /74 (BP Location: Right arm, Patient Position: Sitting, Cuff Size: Adult Regular)   Pulse 84   Temp 96.9  F (36.1  C) (Tympanic)   Resp 16   Ht 1.727 m (5' 8\")   Wt 77.6 kg (171 lb)   SpO2 95%   BMI 26.00 kg/m    GENERAL: The patient is a well-developed, well-nourished, in no apparent distress.  HEENT: Head is normocephalic and atraumatic. Eyes are symmetrical with normal visual tracking. No icterus, no xanthelasmas. Nares appeared normal without nasal drainage. Mucous membranes are moist, no cyanosis.  NECK: Supple. Carotid upstroke are normal bilaterally, no cervical bruits, JVP not visible.   CHEST/ LUNGS: Lungs clear to auscultation, no rales, rhonchi or wheezes, no use of accessory muscles, no retractions, respirations unlabored and normal respiratory rate.   CARDIO: Regular rate and rhythm normal with S1 and S2, no S3 or S4 and no murmur, click or rub. Precordium quiet with normal PMI.    ABD: Abdomen is soft and nontender, nondistended.   EXTREMITIES: No clubbing, cyanosis or edema present.   MUSCULOSKELETAL: No visible joint swelling.   NEUROLOGIC: Alert and oriented X3. Normal speech, gait and affect. No focal neurologic deficits.   SKIN: No jaundice. No rashes or visible skin lesions present. No ecchymosis.       EKG:    NSR, rate 70 bpm, left axis deviation.    LAB RESULTS:   Office Visit on 01/29/2019   Component Date Value Ref Range Status     Hemoglobin A1C 01/29/2019 5.2  4.0 - 6.0 % Final     Sodium 01/29/2019 139  134 - 144 mmol/L Final     Potassium 01/29/2019 3.8  3.5 - 5.1 mmol/L Final     Chloride 01/29/2019 104  98 - 107 mmol/L Final     Carbon Dioxide 01/29/2019 27  21 - 31 mmol/L Final     Anion Gap 01/29/2019 8  3 - 14 mmol/L Final     Glucose 01/29/2019 101  70 - 105 mg/dL " Final     Urea Nitrogen 01/29/2019 22  7 - 25 mg/dL Final     Creatinine 01/29/2019 0.80  0.70 - 1.30 mg/dL Final     GFR Estimate 01/29/2019 >90  >60 mL/min/[1.73_m2] Final     GFR Estimate If Black 01/29/2019 >90  >60 mL/min/[1.73_m2] Final     Calcium 01/29/2019 9.6  8.6 - 10.3 mg/dL Final     Thyrotropin 01/29/2019 2.72  0.34 - 5.60 IU/mL Final          ASSESSMENT:   Jeffrey Cespedes presents for cardiology evaluation with sustained episodes of PSVT. Patient has a history of hyperglycemia, hypertension, osteoarthritis and chronic maxillary sinusitis.  He has no past cardiac history. He describes himself as minimally symptomatic with this, mainly noted times when his pulse was too fast as a concern to him and describes often not feeling anything with this. He reports no symptoms and heart rate now well controlled since he started Diltiazem 180 mg daily.    1. Paroxysmal supraventricular tachycardia (H)  2. Essential hypertension  3. Rapid resting heart rate    PLAN:   1. Reviewed results of recent ZIO monitor with 93 beats episodes of SVT, longest lasting 13 hours and 4 minutes.  Patient was not symptomatic with this longest episode. Two patient triggered events associated to SVT. One 5 beat run of VT verses aberrancy for which she was not symptomatic.  2. He reports rate now has been normal and no symptoms since starting Diltiazem. Discussed that this is likely the only therapy he will need if symptoms controlled and no sustained episodes. He may require further monitoring to assess this given that his symptoms are minimal.   3. If continued sustained episodes of PSVT and/ or increased symptoms, consideration may be made for ablation. He is otherwise a very healthy 86 year old male. I have referred patient to see Dr. Cameron via telemedicine in [place] given his prolonged episodes of PSVT.   4. Reviewed results of recent echo with preserved LVEF at 60-65% No concerning structural abnormalities. Moderate to  severe MAC with mild mitral insufficiency and mild to moderate aortic sclerosis without stenosis.    Thank you for allowing me to participate in the care of your patient. Please do not hesitate to contact me if you have any questions.     Tanika Dhillon

## 2019-03-27 NOTE — NURSING NOTE
"Chief Complaint   Patient presents with     Consult     Paroxysmal SVT       Initial /74 (BP Location: Right arm, Patient Position: Sitting, Cuff Size: Adult Regular)   Pulse 84   Temp 96.9  F (36.1  C) (Tympanic)   Resp 16   Ht 1.727 m (5' 8\")   Wt 77.6 kg (171 lb)   SpO2 95%   BMI 26.00 kg/m   Estimated body mass index is 26 kg/m  as calculated from the following:    Height as of this encounter: 1.727 m (5' 8\").    Weight as of this encounter: 77.6 kg (171 lb).  Meds Reconciled: complete  Pt is on Aspirin  Pt is not on a Statin  PHQ and/or YOAV reviewed. Pt referred to PCP/MH Provider as appropriate.    Pattie Mireles LPN      "

## 2019-03-27 NOTE — TELEPHONE ENCOUNTER
Per [facility], referral for EP telemed with Dr. Cameron for consideration for SVT ablation vs medication therapy. Pt scheduled for 04/18 with an arrival time of 0800 for 0830. Aydee Hill RN on 3/29/2019 at 8:55 AM

## 2019-03-27 NOTE — PATIENT INSTRUCTIONS
You were seen by  DOMI Eldridge CNP      1. Continue on Diltiazem.      2. Telemedicine visit with Dr. Cameron, electrophysiologist at the [facility]. I will contact you to confirm date and time. You will check-in here at the [address] Check-In Window for this visit.     3. No other changes at this time.     Please call the cardiology office with problems, questions, or concerns at [phone].    If you experience chest pain, chest pressure, chest tightness, shortness of breath, fainting, lightheadedness, nausea, vomiting, or other concerning symptoms, please report to the Emergency Department or call 911. These symptoms may be emergent, and best treated in the Emergency Department.     NICOLE KowalskiN, RN  [facility] Cardiology  [phone]

## 2019-03-28 ENCOUNTER — DOCUMENTATION ONLY (OUTPATIENT)
Dept: OTHER | Facility: CLINIC | Age: 84
End: 2019-03-28

## 2019-04-10 ENCOUNTER — TELEPHONE (OUTPATIENT)
Dept: CARDIOLOGY | Facility: OTHER | Age: 84
End: 2019-04-10

## 2019-04-10 NOTE — TELEPHONE ENCOUNTER
Call back to pt who confirms his appt next week for telemedicine with Dr. Cameron 04/18 at 0800 for 0830. Aydee Hill RN on 4/10/2019 at 1:02 PM

## 2019-04-18 ENCOUNTER — ALLIED HEALTH/NURSE VISIT (OUTPATIENT)
Dept: CARDIOLOGY | Facility: OTHER | Age: 84
End: 2019-04-18
Attending: NURSE PRACTITIONER
Payer: COMMERCIAL

## 2019-04-18 ENCOUNTER — OFFICE VISIT (OUTPATIENT)
Dept: CARDIOLOGY | Facility: CLINIC | Age: 84
End: 2019-04-18
Attending: INTERNAL MEDICINE
Payer: MEDICARE

## 2019-04-18 VITALS
SYSTOLIC BLOOD PRESSURE: 120 MMHG | BODY MASS INDEX: 25.72 KG/M2 | HEART RATE: 88 BPM | HEIGHT: 68 IN | RESPIRATION RATE: 16 BRPM | TEMPERATURE: 97.9 F | WEIGHT: 169.7 LBS | OXYGEN SATURATION: 97 % | DIASTOLIC BLOOD PRESSURE: 72 MMHG

## 2019-04-18 VITALS
DIASTOLIC BLOOD PRESSURE: 72 MMHG | RESPIRATION RATE: 16 BRPM | WEIGHT: 169.7 LBS | HEART RATE: 88 BPM | SYSTOLIC BLOOD PRESSURE: 120 MMHG | HEIGHT: 68 IN | TEMPERATURE: 97.9 F | OXYGEN SATURATION: 97 % | BODY MASS INDEX: 25.72 KG/M2

## 2019-04-18 DIAGNOSIS — I47.10 PAROXYSMAL SUPRAVENTRICULAR TACHYCARDIA (H): Primary | ICD-10-CM

## 2019-04-18 DIAGNOSIS — R00.0 TACHYCARDIA: ICD-10-CM

## 2019-04-18 PROCEDURE — 99205 OFFICE O/P NEW HI 60 MIN: CPT | Mod: GT | Performed by: INTERNAL MEDICINE

## 2019-04-18 ASSESSMENT — PAIN SCALES - GENERAL
PAINLEVEL: NO PAIN (0)
PAINLEVEL: NO PAIN (0)

## 2019-04-18 ASSESSMENT — MIFFLIN-ST. JEOR
SCORE: 1424.25
SCORE: 1424.25

## 2019-04-18 NOTE — LETTER
"4/18/2019      RE: Jeffrey Cespedes  [address] [facility] [address]       Dear Colleague,    Thank you for the opportunity to participate in the care of your patient, Jeffrey Cespedes, at the [facility] HEART [facility] at [facility]. Please see a copy of my visit note below.    Electrophysiology Telemedicine Clinic Note  HPI:   Mr. Cespedes is an 86 year old male who has a past medical history significant for HTN, hyperglycemia, OA, BPH, chronic maxillary sinusitis, and PSVT. He was referred today for evaluation and management of SVT.     Patient started developing palpitations and \"fast heartbeat\" in 1/2019. He described the episodes as intermittent, coming and going throughout the day. He saw his PCP and was noted to have some SVT vs AF. Therefore, he had an echo and a zio patch ordered. Echo showed normal structure and function. A zio patch monitor from 2/14/19-2/27/19 showed SR with 93 SVT episodes up to 13 hours 4 minutes with 3 symptom activations (1 in SR 2 in SVT). He was started on Diltiazem 180 mg CD daily. He had normal TSH, electrolytes and renal function. He continued to have occasional fluttering/palpitations. He has history of HTN and has been on hydrochlorothiazide, he has otherwise been quite healthy. No other cardiac history.     He states since starting the diltiazem his symptoms have significantly improved. He sometimes has a pulse rate up to around 90 bpms for awhile, but then goes back to around 60 bpm. He is not overly symptomatic with this. denies any chest pain/pressures, dizziness, lightheadedness, worsening shortness of breath, leg/ankle swelling, PND, orthopnea, palpitations, or syncopal symptoms. Current cardiac medications include: Diltiazem, hydrochlorothiazide, and ASA.     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, 1989,Reoccured     Enlarged prostate with lower urinary tract symptoms (LUTS)     No Comments Provided     Essential (primary) hypertension     No Comments Provided     Osteoarthritis     No Comments Provided     Personal history of other malignant neoplasm of skin (CODE)     2010     Zoster without complications     2009       CURRENT MEDICATIONS:  Current Outpatient Medications   Medication Sig Dispense Refill     ASPIRIN 81 PO Take 81 mg by mouth daily       diltiazem ER COATED BEADS (CARDIAZEM LA) 180 MG 24 hr tablet TAKE 1 TABLET(180 MG) BY MOUTH DAILY 90 tablet 3     finasteride (PROSCAR) 5 MG tablet Take 1 tablet (5 mg) by mouth daily 90 tablet 3     Glucosamine Sulfate 500 MG TABS Take 1,000 mg by mouth daily       hydrochlorothiazide (HYDRODIURIL) 25 MG tablet Take 1 tablet (25 mg) by mouth daily 90 tablet 3     ibuprofen (ADVIL/MOTRIN) 200 MG tablet Take 200 mg by mouth daily       loratadine (CLARITIN) 10 MG tablet Take 10 mg by mouth daily as needed       Multiple Vitamin (MULTI-VITAMINS) TABS Take 1 tablet by mouth daily       tamsulosin (FLOMAX) 0.4 MG capsule Take 1 capsule (0.4 mg) by mouth daily 90 capsule 3       PAST SURGICAL HISTORY:  Past Surgical History:   Procedure Laterality Date     ARTHROSCOPY SHOULDER Right 2013     COLONOSCOPY      2006,Repeat in 2011     COLONOSCOPY      2011,F/U 2021, if appropriate     EXTRACAPSULAR CATARACT EXTRATION WITH INTRAOCULAR LENS IMPLANT Bilateral      FRACTURE SURGERY      1977,Compression fracture of right tibia. Skiing accident, surgical repair     HERNIA REPAIR, INGUINAL RT/LT Left 2013    with mesh     HERNIA REPAIR, INGUINAL RT/LT Right 2014     SIGMOIDOSCOPY FLEXIBLE      1998,Normal       ALLERGIES:     Allergies   Allergen Reactions     Ace Inhibitors Cough     Iodine Hives       FAMILY HISTORY:  Family History   Problem Relation Age of Onset     Other - See Comments Father         Stroke, 94     Other - See Comments  "Mother         Old age     HIV/AIDS Brother         HIV     Other - See Comments Brother         While getting pacemaker     Cancer Sister         Cancer,In bones--left femur       SOCIAL HISTORY:  Social History     Tobacco Use     Smoking status: Former Smoker     Types: Cigarettes     Last attempt to quit: 1954     Years since quittin.9     Smokeless tobacco: Never Used   Substance Use Topics     Alcohol use: Yes     Alcohol/week: 2.5 oz     Comment: Occasional     Drug use: No     Comment: Drug use: No       ROS:   A comprehensive 10 point review of systems negative other than as mentioned in HPI.  Exam:  /72 (BP Location: Right arm, Patient Position: Sitting, Cuff Size: Adult Regular)   Pulse 88   Temp 97.9  F (36.6  C) (Tympanic)   Resp 16   Ht 1.727 m (5' 8\")   Wt 77 kg (169 lb 11.2 oz)   SpO2 97%   BMI 25.80 kg/m      Exam assisted by local RN:  GENERAL APPEARANCE: alert and no distress  HEENT: MMM, PERRLA.   NECK: supple.   RESPIRATORY: lungs clear to auscultation, normal respiratory rate  CARDIOVASCULAR: regular, no murmur.   ABDOMEN: soft, non tender, bowel sounds normal, non-distended  EXTREMITIES: peripheral pulses normal, no edema  NEURO: alert and oriented to person/place/time, normal speech, gait and affect  SKIN: no ecchymoses, no rashes  PSYCH: normal affect, cooperative    Labs:  Reviewed.     Testing/Procedures:  2019 ECHOCARDIOGRAM:   Interpretation Summary  Global and regional left ventricular function is normal with an EF of 60-65%.  Right ventricular function, chamber size, wall motion, and thickness are  normal.  The inferior vena cava was normal in size with preserved respiratory  variability.  No pericardial effusion is present.      Assessment and Plan:   Mr. Cespedes is an 86 year old male who has a past medical history significant for HTN, hyperglycemia, OA, BPH, chronic maxillary sinusitis, and PSVT. He was referred today for evaluation and management of SVT. " "Patient started developing palpitations and \"fast heartbeat\" in 1/2019. Echo showed normal structure and function. A zio patch monitor from 2/14/19-2/27/19 showed SR with 93 SVT episodes up to 13 hours 4 minutes with 3 symptom activations (1 in SR 2 in SVT). He was started on Diltiazem 180 mg CD daily. He had normal TSH, electrolytes and renal function. He states since starting the diltiazem his symptoms have significantly improved. He sometimes has a pulse rate up to around 90 bpms for awhile, but then goes back to around 60 bpm. He is not overly symptomatic with this.    Supraventricular Tachycardia:   His zio patch shows a long RP tachycardia, most c/w an A.Tach. We discussed various options for treatment of SVT. This is not a life threatening arrhythmia. Treatment is indicated primarily for relief of symptoms. Medications such as calcium channel blockers or beta blockers in some cases reduce the frequency and duration of the episodes but they will not cure it. The other option discussed was an electrophysiology study (EPS) and possible ablation. Success rate of ablation 80-90% depending on the mechanism. He has been feeling well with the addition of diltiazem. We will get an updated zio patch to evaluate burden on diltiazem. If well controlled, then we would not pursue any further intervention. If he continues to have a higher burden of prolonged episodes, then we would consider ablation.   Follow up with EP in 6 months via telemedicine.     The patient states understanding and is agreeable with plan.   Dameon Cameron MD [facility]  Cardiology - Electrophysiology        "

## 2019-04-18 NOTE — NURSING NOTE
"Pt presents for consult r/t PSVT per [facility]. Pt denies chest pain/pressure, SOB, nausea, fatigue, or lightheadedness. Lungs clear to ascultation. Regular heart rate and rhythm today.    Chief Complaint   Patient presents with     Allied Health Visit     PSVT consult       Initial /72 (BP Location: Right arm, Patient Position: Sitting, Cuff Size: Adult Regular)   Pulse 88   Temp 97.9  F (36.6  C) (Tympanic)   Resp 16   Ht 1.727 m (5' 8\")   Wt 77 kg (169 lb 11.2 oz)   SpO2 97%   BMI 25.80 kg/m   Estimated body mass index is 25.8 kg/m  as calculated from the following:    Height as of this encounter: 1.727 m (5' 8\").    Weight as of this encounter: 77 kg (169 lb 11.2 oz).  Meds Reconciled: complete  Pt is on Aspirin  Pt is not on a Statin  PHQ and/or YOAV reviewed. Pt referred to PCP/MH Provider as appropriate.    PLAN per Dr. Cameron:    1. Stay on Diltiazem 180 mg once daily.   2. Repeat Zio Patch here at [facility]. You will be called to schedule this. We will call with results.   3. No other changes.    You will follow up with Dr. Cameron in 6 months. You will be called to schedule this.     Aydee Hill RN      "

## 2019-04-18 NOTE — PATIENT INSTRUCTIONS
You were seen by Dr. Cameron.      1. Stay on Diltiazem 180 mg once daily.     2. Repeat Zio Patch here at [facility]. You will be called to schedule this. We will call with results.     3. No other changes.    You will follow up with Dr. Cameron in 6 months. You will be called to schedule this.       Please call the cardiology office with problems, questions, or concerns at [phone].    If you experience chest pain, chest pressure, chest tightness, shortness of breath, fainting, lightheadedness, nausea, vomiting, or other concerning symptoms, please report to the Emergency Department or call 911. These symptoms may be emergent, and best treated in the Emergency Department.     NICOLE KowalskiN, RN  [facility] Cardiology  [phone]

## 2019-04-18 NOTE — PROGRESS NOTES
"Electrophysiology Telemedicine Clinic Note  HPI:   Mr. Cespedes is an 86 year old male who has a past medical history significant for HTN, hyperglycemia, OA, BPH, chronic maxillary sinusitis, and PSVT. He was referred today for evaluation and management of SVT.     Patient started developing palpitations and \"fast heartbeat\" in 1/2019. He described the episodes as intermittent, coming and going throughout the day. He saw his PCP and was noted to have some SVT vs AF. Therefore, he had an echo and a zio patch ordered. Echo showed normal structure and function. A zio patch monitor from 2/14/19-2/27/19 showed SR with 93 SVT episodes up to 13 hours 4 minutes with 3 symptom activations (1 in SR 2 in SVT). He was started on Diltiazem 180 mg CD daily. He had normal TSH, electrolytes and renal function. He continued to have occasional fluttering/palpitations. He has history of HTN and has been on hydrochlorothiazide, he has otherwise been quite healthy. No other cardiac history.     He states since starting the diltiazem his symptoms have significantly improved. He sometimes has a pulse rate up to around 90 bpms for awhile, but then goes back to around 60 bpm. He is not overly symptomatic with this. denies any chest pain/pressures, dizziness, lightheadedness, worsening shortness of breath, leg/ankle swelling, PND, orthopnea, palpitations, or syncopal symptoms. Current cardiac medications include: Diltiazem, hydrochlorothiazide, and ASA.     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     Calculus of kidney     1977, 1989,Reoccured     Enlarged prostate with lower urinary tract symptoms (LUTS)     No Comments Provided     Essential (primary) hypertension     No Comments Provided     Osteoarthritis     No Comments Provided     Personal history of other malignant neoplasm of skin (CODE)     2010     " Zoster without complications     2009       CURRENT MEDICATIONS:  Current Outpatient Medications   Medication Sig Dispense Refill     ASPIRIN 81 PO Take 81 mg by mouth daily       diltiazem ER COATED BEADS (CARDIAZEM LA) 180 MG 24 hr tablet TAKE 1 TABLET(180 MG) BY MOUTH DAILY 90 tablet 3     finasteride (PROSCAR) 5 MG tablet Take 1 tablet (5 mg) by mouth daily 90 tablet 3     Glucosamine Sulfate 500 MG TABS Take 1,000 mg by mouth daily       hydrochlorothiazide (HYDRODIURIL) 25 MG tablet Take 1 tablet (25 mg) by mouth daily 90 tablet 3     ibuprofen (ADVIL/MOTRIN) 200 MG tablet Take 200 mg by mouth daily       loratadine (CLARITIN) 10 MG tablet Take 10 mg by mouth daily as needed       Multiple Vitamin (MULTI-VITAMINS) TABS Take 1 tablet by mouth daily       tamsulosin (FLOMAX) 0.4 MG capsule Take 1 capsule (0.4 mg) by mouth daily 90 capsule 3       PAST SURGICAL HISTORY:  Past Surgical History:   Procedure Laterality Date     ARTHROSCOPY SHOULDER Right 2013     COLONOSCOPY      2006,Repeat in 2011     COLONOSCOPY      2011,F/U 2021, if appropriate     EXTRACAPSULAR CATARACT EXTRATION WITH INTRAOCULAR LENS IMPLANT Bilateral      FRACTURE SURGERY      1977,Compression fracture of right tibia. Skiing accident, surgical repair     HERNIA REPAIR, INGUINAL RT/LT Left 2013    with mesh     HERNIA REPAIR, INGUINAL RT/LT Right 2014     SIGMOIDOSCOPY FLEXIBLE      1998,Normal       ALLERGIES:     Allergies   Allergen Reactions     Ace Inhibitors Cough     Iodine Hives       FAMILY HISTORY:  Family History   Problem Relation Age of Onset     Other - See Comments Father         Stroke, 94     Other - See Comments Mother         Old age     HIV/AIDS Brother         HIV     Other - See Comments Brother         While getting pacemaker     Cancer Sister         Cancer,In bones--left femur       SOCIAL HISTORY:  Social History     Tobacco Use     Smoking status: Former Smoker     Types: Cigarettes     Last attempt to quit:  "1954     Years since quittin.9     Smokeless tobacco: Never Used   Substance Use Topics     Alcohol use: Yes     Alcohol/week: 2.5 oz     Comment: Occasional     Drug use: No     Comment: Drug use: No       ROS:   A comprehensive 10 point review of systems negative other than as mentioned in HPI.  Exam:  /72 (BP Location: Right arm, Patient Position: Sitting, Cuff Size: Adult Regular)   Pulse 88   Temp 97.9  F (36.6  C) (Tympanic)   Resp 16   Ht 1.727 m (5' 8\")   Wt 77 kg (169 lb 11.2 oz)   SpO2 97%   BMI 25.80 kg/m     Exam assisted by local RN:  GENERAL APPEARANCE: alert and no distress  HEENT: MMM, PERRLA.   NECK: supple.   RESPIRATORY: lungs clear to auscultation, normal respiratory rate  CARDIOVASCULAR: regular, no murmur.   ABDOMEN: soft, non tender, bowel sounds normal, non-distended  EXTREMITIES: peripheral pulses normal, no edema  NEURO: alert and oriented to person/place/time, normal speech, gait and affect  SKIN: no ecchymoses, no rashes  PSYCH: normal affect, cooperative    Labs:  Reviewed.     Testing/Procedures:  2019 ECHOCARDIOGRAM:   Interpretation Summary  Global and regional left ventricular function is normal with an EF of 60-65%.  Right ventricular function, chamber size, wall motion, and thickness are  normal.  The inferior vena cava was normal in size with preserved respiratory  variability.  No pericardial effusion is present.      Assessment and Plan:   Mr. Cespedes is an 86 year old male who has a past medical history significant for HTN, hyperglycemia, OA, BPH, chronic maxillary sinusitis, and PSVT. He was referred today for evaluation and management of SVT. Patient started developing palpitations and \"fast heartbeat\" in 2019. Echo showed normal structure and function. A zio patch monitor from 19-19 showed SR with 93 SVT episodes up to 13 hours 4 minutes with 3 symptom activations (1 in SR 2 in SVT). He was started on Diltiazem 180 mg CD daily. He had normal " TSH, electrolytes and renal function. He states since starting the diltiazem his symptoms have significantly improved. He sometimes has a pulse rate up to around 90 bpms for awhile, but then goes back to around 60 bpm. He is not overly symptomatic with this.    Supraventricular Tachycardia:   His zio patch shows a long RP tachycardia, most c/w an A.Tach. We discussed various options for treatment of SVT. This is not a life threatening arrhythmia. Treatment is indicated primarily for relief of symptoms. Medications such as calcium channel blockers or beta blockers in some cases reduce the frequency and duration of the episodes but they will not cure it. The other option discussed was an electrophysiology study (EPS) and possible ablation. Success rate of ablation 80-90% depending on the mechanism. He has been feeling well with the addition of diltiazem. We will get an updated zio patch to evaluate burden on diltiazem. If well controlled, then we would not pursue any further intervention. If he continues to have a higher burden of prolonged episodes, then we would consider ablation.   Follow up with EP in 6 months via telemedicine.     The patient states understanding and is agreeable with plan.   Dameon Cameron MD [facility]  Cardiology - Electrophysiology

## 2019-04-29 ENCOUNTER — HOSPITAL ENCOUNTER (OUTPATIENT)
Dept: CARDIOLOGY | Facility: OTHER | Age: 84
Discharge: HOME | End: 2019-04-29
Attending: INTERNAL MEDICINE | Admitting: INTERNAL MEDICINE
Payer: MEDICARE

## 2019-04-29 DIAGNOSIS — I47.10 PAROXYSMAL SUPRAVENTRICULAR TACHYCARDIA (H): ICD-10-CM

## 2019-04-29 PROCEDURE — 0296T ZIO PATCH HOLTER ADULT PEDIATRIC GREATER THAN 48 HRS: CPT

## 2019-04-29 PROCEDURE — 0298T ZIO PATCH HOLTER ADULT PEDIATRIC GREATER THAN 48 HRS: CPT | Performed by: INTERNAL MEDICINE

## 2019-04-29 NOTE — PATIENT INSTRUCTIONS
Patient instructed not to:   -take baths, swim, sauna   -remove device prior to 14 days   -use electric blankets   -shower or sweat excessively within first 24 hours of device application  Patient instructed to:   -shower as needed   -be carefull when toweling off and dressing   -press button when cardiac symptoms occur   -document symptoms in log book   -remove and return device (send via mail to [employer])   -Call [facility] with any questions

## 2019-05-29 ENCOUNTER — TELEPHONE (OUTPATIENT)
Dept: CARDIOLOGY | Facility: OTHER | Age: 84
End: 2019-05-29

## 2019-05-29 NOTE — TELEPHONE ENCOUNTER
"Call back received from pt. Advised of the following per LVW:    \"He is still having frequent SVT episodes up to 6 hours 15 minutes. We will need to discuss with him via telemedicine further management strategies.\"    Pt verbalizes understanding and is agreeable to plan. Now scheduled for f/u with Dr. Cameron on 06/04 with an arrival time of 0800 for an 0830 appt. Aydee Hill RN on 5/29/2019 at 10:16 AM   "

## 2019-06-04 ENCOUNTER — ALLIED HEALTH/NURSE VISIT (OUTPATIENT)
Dept: CARDIOLOGY | Facility: OTHER | Age: 84
End: 2019-06-04
Attending: NURSE PRACTITIONER
Payer: COMMERCIAL

## 2019-06-04 ENCOUNTER — OFFICE VISIT (OUTPATIENT)
Dept: CARDIOLOGY | Facility: CLINIC | Age: 84
End: 2019-06-04
Attending: INTERNAL MEDICINE
Payer: MEDICARE

## 2019-06-04 VITALS
DIASTOLIC BLOOD PRESSURE: 74 MMHG | HEART RATE: 80 BPM | TEMPERATURE: 97.3 F | WEIGHT: 166.3 LBS | OXYGEN SATURATION: 96 % | BODY MASS INDEX: 25.2 KG/M2 | RESPIRATION RATE: 16 BRPM | HEIGHT: 68 IN | SYSTOLIC BLOOD PRESSURE: 118 MMHG

## 2019-06-04 VITALS
BODY MASS INDEX: 25.2 KG/M2 | TEMPERATURE: 97.3 F | RESPIRATION RATE: 16 BRPM | WEIGHT: 166.3 LBS | DIASTOLIC BLOOD PRESSURE: 74 MMHG | SYSTOLIC BLOOD PRESSURE: 118 MMHG | HEIGHT: 68 IN | OXYGEN SATURATION: 96 % | HEART RATE: 80 BPM

## 2019-06-04 DIAGNOSIS — I47.10 PAROXYSMAL SUPRAVENTRICULAR TACHYCARDIA (H): Primary | ICD-10-CM

## 2019-06-04 DIAGNOSIS — R00.0 TACHYCARDIA: ICD-10-CM

## 2019-06-04 PROCEDURE — 99213 OFFICE O/P EST LOW 20 MIN: CPT | Performed by: INTERNAL MEDICINE

## 2019-06-04 ASSESSMENT — MIFFLIN-ST. JEOR
SCORE: 1403.83
SCORE: 1403.83

## 2019-06-04 ASSESSMENT — PAIN SCALES - GENERAL
PAINLEVEL: NO PAIN (0)
PAINLEVEL: NO PAIN (0)

## 2019-06-04 NOTE — LETTER
"6/4/2019      RE: Jeffrey Cespedes  [address] [facility] [address]       Dear Colleague,    Thank you for the opportunity to participate in the care of your patient, Jeffrey Cespedes, at the [facility] at [facility]. Please see a copy of my visit note below.    Electrophysiology Telemedicine Clinic Note  HPI:   Mr. Cespedes is an 87 year old male who has a past medical history significant for HTN, hyperglycemia, OA, BPH, chronic maxillary sinusitis, and PSVT. He was referred today for evaluation and management of SVT.     Patient started developing palpitations and \"fast heartbeat\" in 1/2019. He described the episodes as intermittent, coming and going throughout the day. He saw his PCP and was noted to have some SVT vs AF. Therefore, he had an echo and a zio patch ordered. Echo showed normal structure and function. A zio patch monitor from 2/14/19-2/27/19 showed SR with 93 SVT episodes up to 13 hours 4 minutes with 3 symptom activations (1 in SR 2 in SVT). He was started on Diltiazem 180 mg CD daily. He had normal TSH, electrolytes and renal function. He continued to have occasional fluttering/palpitations. He has history of HTN and has been on hydrochlorothiazide, he has otherwise been quite healthy. No other cardiac history.     EP Telemed Visit 4/18/19: He states since starting the diltiazem his symptoms have significantly improved. He sometimes has a pulse rate up to around 90 bpms for awhile, but then goes back to around 60 bpm. He is not overly symptomatic with this. denies any chest pain/pressures, dizziness, lightheadedness, worsening shortness of breath, leg/ankle swelling, PND, orthopnea, palpitations, or syncopal symptoms. Current cardiac medications include: Diltiazem, hydrochlorothiazide, and ASA.     He presents today for follow up. A repeat zio patch monitor on diltiazem from 4/29/19-5/12/19 showed SR with 56 SVT up to 6 hours 15 minutes and NSVT up to " 10.2 seconds. No symptom activations. He reports feeling well. He states he has some decreased stamina, but this has proceeded starting diltiazem or his prior symptoms of palpitations.  He denies any chest pain/pressures, dizziness, lightheadedness, worsening shortness of breath, leg/ankle swelling, PND, orthopnea, palpitations, or syncopal symptoms. Current cardiac medications include: Diltiazem, hydrochlorothiazide, and ASA.       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     Calculus of kidney     1977, 1989,Reoccured     Enlarged prostate with lower urinary tract symptoms (LUTS)     No Comments Provided     Essential (primary) hypertension     No Comments Provided     Osteoarthritis     No Comments Provided     Personal history of other malignant neoplasm of skin (CODE)     2010     Zoster without complications     2009       CURRENT MEDICATIONS:  Current Outpatient Medications   Medication Sig Dispense Refill     ASPIRIN 81 PO Take 81 mg by mouth daily       diltiazem ER COATED BEADS (CARDIAZEM LA) 180 MG 24 hr tablet TAKE 1 TABLET(180 MG) BY MOUTH DAILY 90 tablet 3     finasteride (PROSCAR) 5 MG tablet Take 1 tablet (5 mg) by mouth daily 90 tablet 3     Glucosamine Sulfate 500 MG TABS Take 1,000 mg by mouth daily       hydrochlorothiazide (HYDRODIURIL) 25 MG tablet Take 1 tablet (25 mg) by mouth daily 90 tablet 3     ibuprofen (ADVIL/MOTRIN) 200 MG tablet Take 200 mg by mouth daily       loratadine (CLARITIN) 10 MG tablet Take 10 mg by mouth daily as needed       Multiple Vitamin (MULTI-VITAMINS) TABS Take 1 tablet by mouth daily       tamsulosin (FLOMAX) 0.4 MG capsule Take 1 capsule (0.4 mg) by mouth daily 90 capsule 3       PAST SURGICAL HISTORY:  Past Surgical History:   Procedure Laterality Date     ARTHROSCOPY SHOULDER Right 2013     COLONOSCOPY      2006,Repeat in 2011      "COLONOSCOPY      ,F/U , if appropriate     EXTRACAPSULAR CATARACT EXTRATION WITH INTRAOCULAR LENS IMPLANT Bilateral      FRACTURE SURGERY      ,Compression fracture of right tibia. Skiing accident, surgical repair     HERNIA REPAIR, INGUINAL RT/LT Left     with mesh     HERNIA REPAIR, INGUINAL RT/LT Right      SIGMOIDOSCOPY FLEXIBLE      ,Normal       ALLERGIES:     Allergies   Allergen Reactions     Ace Inhibitors Cough     Iodine Hives       FAMILY HISTORY:  Family History   Problem Relation Age of Onset     Other - See Comments Father         Stroke, 94     Other - See Comments Mother         Old age     HIV/AIDS Brother         HIV     Other - See Comments Brother         While getting pacemaker     Cancer Sister         Cancer,In bones--left femur       SOCIAL HISTORY:  Social History     Tobacco Use     Smoking status: Former Smoker     Types: Cigarettes     Last attempt to quit: 1954     Years since quittin.0     Smokeless tobacco: Never Used   Substance Use Topics     Alcohol use: Yes     Alcohol/week: 2.5 oz     Comment: Occasional     Drug use: No     Comment: Drug use: No       ROS:   A comprehensive 10 point review of systems negative other than as mentioned in HPI.  Exam:  /74 (BP Location: Right arm, Patient Position: Sitting, Cuff Size: Adult Regular)   Pulse 80   Temp 97.3  F (36.3  C)   Resp 16   Ht 1.727 m (5' 8\")   Wt 75.4 kg (166 lb 4.8 oz)   SpO2 96%   BMI 25.29 kg/m      Exam assisted by local RN:  GENERAL APPEARANCE: alert and no distress  HEENT: MMM, PERRLA.   NECK: supple.   RESPIRATORY: lungs clear to auscultation, normal respiratory rate  CARDIOVASCULAR: regular, no murmur.   ABDOMEN: soft, non tender, bowel sounds normal, non-distended  EXTREMITIES: peripheral pulses normal, no edema  NEURO: alert and oriented to person/place/time, normal speech, gait and affect  SKIN: no ecchymoses, no rashes  PSYCH: normal affect, " "cooperative    Labs:  Reviewed.     Testing/Procedures:  2/2019 ECHOCARDIOGRAM:   Interpretation Summary  Global and regional left ventricular function is normal with an EF of 60-65%.  Right ventricular function, chamber size, wall motion, and thickness are  normal.  The inferior vena cava was normal in size with preserved respiratory  variability.  No pericardial effusion is present.      Assessment and Plan:   Mr. Cespedes is an 87 year old male who has a past medical history significant for HTN, hyperglycemia, OA, BPH, chronic maxillary sinusitis, and PSVT. Patient started developing palpitations and \"fast heartbeat\" in 1/2019. Echo showed normal structure and function. A zio patch monitor from 2/14/19-2/27/19 showed SR with 93 SVT episodes up to 13 hours 4 minutes with 3 symptom activations (1 in SR 2 in SVT). He was started on Diltiazem 180 mg CD daily. He stated since starting the diltiazem his symptoms have significantly improved. He presents today for follow up. A repeat zio patch monitor on diltiazem from 4/29/19-5/12/19 showed SR with 56 SVT up to 6 hours 15 minutes and NSVT up to 10.2 seconds. No symptom activations. He reports feeling well. He states he has some decreased stamina, but this has proceeded starting diltiazem or his prior symptoms of palpitations.     Supraventricular Tachycardia:   His two zio patch shows a long RP tachycardia, most c/w an A.Tach. We discussed various options for treatment of SVT. This is not a life threatening arrhythmia. Treatment is indicated primarily for relief of symptoms. Medications such as calcium channel blockers or beta blockers in some cases reduce the frequency and duration of the episodes but they will not cure it. The other option discussed was an electrophysiology study (EPS) and possible ablation. Success rate of ablation 80-95% depending on the mechanism. He has been feeling well with the addition of diltiazem. He is not overly bothered by this. He would " prefer conservative management for now. We will continue with diltiazem and then have him follow up via telemedicine with Echo and zio patch in 1 year.      The patient states understanding and is agreeable with plan.   Dameon Cameron MD [facility]  Cardiology - Electrophysiology

## 2019-06-04 NOTE — PATIENT INSTRUCTIONS
You were seen by  Dr. Cameron.      1. An Echocardiogram and a Zio Patch has been ordered to be completed in one year. You will be called to schedule this. You will receive instructions for testing at that time.       2. No changes today. Continue current medications as prescribed.     You will follow up with Dr. Cameron in 1 year (after testing is complete), sooner if needed.       Please call the cardiology office with problems, questions, or concerns at [phone].    If you experience chest pain, chest pressure, chest tightness, shortness of breath, fainting, lightheadedness, nausea, vomiting, or other concerning symptoms, please report to the Emergency Department or call 911. These symptoms may be emergent, and best treated in the Emergency Department.     NICOLE KowalskiN, RN  [facility] Cardiology  [phone]

## 2019-06-04 NOTE — NURSING NOTE
"Pt presents for f/u Zio Patch. Denies chest pain/pressure, nausea, or lightheadedness. C/o SOB with exertion. Can sometimes feel rapid heart rates but not always.     Regular rate and rhythm today. Lungs clear to auscultation.     Initial /74 (BP Location: Right arm, Patient Position: Sitting, Cuff Size: Adult Regular)   Pulse 80   Temp 97.3  F (36.3  C)   Resp 16   Ht 1.727 m (5' 8\")   Wt 75.4 kg (166 lb 4.8 oz)   SpO2 96%   BMI 25.29 kg/m   Estimated body mass index is 25.29 kg/m  as calculated from the following:    Height as of this encounter: 1.727 m (5' 8\").    Weight as of this encounter: 75.4 kg (166 lb 4.8 oz).  Meds Reconciled: complete  Pt is on Aspirin  Pt is not on a Statin  PHQ and/or YOAV reviewed. Pt referred to PCP/MH Provider as appropriate.      PLAN per Dr. Cameron:    1. An Echocardiogram and a Zio Patch has been ordered to be completed in one year. You will be called to schedule this. You will receive instructions for testing at that time.      2. No changes today. Continue current medications as prescribed.     You will follow up with Dr. Cameron in 1 year (after testing is complete), sooner if needed.       Aydee Hill RN      "

## 2019-06-04 NOTE — PROGRESS NOTES
"Electrophysiology Telemedicine Clinic Note  HPI:   Mr. Cespedes is an 87 year old male who has a past medical history significant for HTN, hyperglycemia, OA, BPH, chronic maxillary sinusitis, and PSVT. He was referred today for evaluation and management of SVT.     Patient started developing palpitations and \"fast heartbeat\" in 1/2019. He described the episodes as intermittent, coming and going throughout the day. He saw his PCP and was noted to have some SVT vs AF. Therefore, he had an echo and a zio patch ordered. Echo showed normal structure and function. A zio patch monitor from 2/14/19-2/27/19 showed SR with 93 SVT episodes up to 13 hours 4 minutes with 3 symptom activations (1 in SR 2 in SVT). He was started on Diltiazem 180 mg CD daily. He had normal TSH, electrolytes and renal function. He continued to have occasional fluttering/palpitations. He has history of HTN and has been on hydrochlorothiazide, he has otherwise been quite healthy. No other cardiac history.     EP Telemed Visit 4/18/19: He states since starting the diltiazem his symptoms have significantly improved. He sometimes has a pulse rate up to around 90 bpms for awhile, but then goes back to around 60 bpm. He is not overly symptomatic with this. denies any chest pain/pressures, dizziness, lightheadedness, worsening shortness of breath, leg/ankle swelling, PND, orthopnea, palpitations, or syncopal symptoms. Current cardiac medications include: Diltiazem, hydrochlorothiazide, and ASA.     He presents today for follow up. A repeat zio patch monitor on diltiazem from 4/29/19-5/12/19 showed SR with 56 SVT up to 6 hours 15 minutes and NSVT up to 10.2 seconds. No symptom activations. He reports feeling well. He states he has some decreased stamina, but this has proceeded starting diltiazem or his prior symptoms of palpitations.  He denies any chest pain/pressures, dizziness, lightheadedness, worsening shortness of breath, leg/ankle swelling, PND, " orthopnea, palpitations, or syncopal symptoms. Current cardiac medications include: Diltiazem, hydrochlorothiazide, and ASA.       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     Calculus of kidney     1977, 1989,Reoccured     Enlarged prostate with lower urinary tract symptoms (LUTS)     No Comments Provided     Essential (primary) hypertension     No Comments Provided     Osteoarthritis     No Comments Provided     Personal history of other malignant neoplasm of skin (CODE)     2010     Zoster without complications     2009       CURRENT MEDICATIONS:  Current Outpatient Medications   Medication Sig Dispense Refill     ASPIRIN 81 PO Take 81 mg by mouth daily       diltiazem ER COATED BEADS (CARDIAZEM LA) 180 MG 24 hr tablet TAKE 1 TABLET(180 MG) BY MOUTH DAILY 90 tablet 3     finasteride (PROSCAR) 5 MG tablet Take 1 tablet (5 mg) by mouth daily 90 tablet 3     Glucosamine Sulfate 500 MG TABS Take 1,000 mg by mouth daily       hydrochlorothiazide (HYDRODIURIL) 25 MG tablet Take 1 tablet (25 mg) by mouth daily 90 tablet 3     ibuprofen (ADVIL/MOTRIN) 200 MG tablet Take 200 mg by mouth daily       loratadine (CLARITIN) 10 MG tablet Take 10 mg by mouth daily as needed       Multiple Vitamin (MULTI-VITAMINS) TABS Take 1 tablet by mouth daily       tamsulosin (FLOMAX) 0.4 MG capsule Take 1 capsule (0.4 mg) by mouth daily 90 capsule 3       PAST SURGICAL HISTORY:  Past Surgical History:   Procedure Laterality Date     ARTHROSCOPY SHOULDER Right 2013     COLONOSCOPY      2006,Repeat in 2011     COLONOSCOPY      2011,F/U 2021, if appropriate     EXTRACAPSULAR CATARACT EXTRATION WITH INTRAOCULAR LENS IMPLANT Bilateral      FRACTURE SURGERY      1977,Compression fracture of right tibia. Skiing accident, surgical repair     HERNIA REPAIR, INGUINAL RT/LT Left 2013    with mesh     HERNIA REPAIR,  "INGUINAL RT/LT Right      SIGMOIDOSCOPY FLEXIBLE      ,Normal       ALLERGIES:     Allergies   Allergen Reactions     Ace Inhibitors Cough     Iodine Hives       FAMILY HISTORY:  Family History   Problem Relation Age of Onset     Other - See Comments Father         Stroke, 94     Other - See Comments Mother         Old age     HIV/AIDS Brother         HIV     Other - See Comments Brother         While getting pacemaker     Cancer Sister         Cancer,In bones--left femur       SOCIAL HISTORY:  Social History     Tobacco Use     Smoking status: Former Smoker     Types: Cigarettes     Last attempt to quit: 1954     Years since quittin.0     Smokeless tobacco: Never Used   Substance Use Topics     Alcohol use: Yes     Alcohol/week: 2.5 oz     Comment: Occasional     Drug use: No     Comment: Drug use: No       ROS:   A comprehensive 10 point review of systems negative other than as mentioned in HPI.  Exam:  /74 (BP Location: Right arm, Patient Position: Sitting, Cuff Size: Adult Regular)   Pulse 80   Temp 97.3  F (36.3  C)   Resp 16   Ht 1.727 m (5' 8\")   Wt 75.4 kg (166 lb 4.8 oz)   SpO2 96%   BMI 25.29 kg/m     Exam assisted by local RN:  GENERAL APPEARANCE: alert and no distress  HEENT: MMM, PERRLA.   NECK: supple.   RESPIRATORY: lungs clear to auscultation, normal respiratory rate  CARDIOVASCULAR: regular, no murmur.   ABDOMEN: soft, non tender, bowel sounds normal, non-distended  EXTREMITIES: peripheral pulses normal, no edema  NEURO: alert and oriented to person/place/time, normal speech, gait and affect  SKIN: no ecchymoses, no rashes  PSYCH: normal affect, cooperative    Labs:  Reviewed.     Testing/Procedures:  2019 ECHOCARDIOGRAM:   Interpretation Summary  Global and regional left ventricular function is normal with an EF of 60-65%.  Right ventricular function, chamber size, wall motion, and thickness are  normal.  The inferior vena cava was normal in size with preserved " "respiratory  variability.  No pericardial effusion is present.      Assessment and Plan:   Mr. Cespedes is an 87 year old male who has a past medical history significant for HTN, hyperglycemia, OA, BPH, chronic maxillary sinusitis, and PSVT. Patient started developing palpitations and \"fast heartbeat\" in 1/2019. Echo showed normal structure and function. A zio patch monitor from 2/14/19-2/27/19 showed SR with 93 SVT episodes up to 13 hours 4 minutes with 3 symptom activations (1 in SR 2 in SVT). He was started on Diltiazem 180 mg CD daily. He stated since starting the diltiazem his symptoms have significantly improved. He presents today for follow up. A repeat zio patch monitor on diltiazem from 4/29/19-5/12/19 showed SR with 56 SVT up to 6 hours 15 minutes and NSVT up to 10.2 seconds. No symptom activations. He reports feeling well. He states he has some decreased stamina, but this has proceeded starting diltiazem or his prior symptoms of palpitations.     Supraventricular Tachycardia:   His two zio patch shows a long RP tachycardia, most c/w an A.Tach. We discussed various options for treatment of SVT. This is not a life threatening arrhythmia. Treatment is indicated primarily for relief of symptoms. Medications such as calcium channel blockers or beta blockers in some cases reduce the frequency and duration of the episodes but they will not cure it. The other option discussed was an electrophysiology study (EPS) and possible ablation. Success rate of ablation 80-95% depending on the mechanism. He has been feeling well with the addition of diltiazem. He is not overly bothered by this. He would prefer conservative management for now. We will continue with diltiazem and then have him follow up via telemedicine with Echo and zio patch in 1 year.      The patient states understanding and is agreeable with plan.   Dameon Cameron MD [facility]  Cardiology - Electrophysiology          "

## 2019-06-12 DIAGNOSIS — R39.9 LOWER URINARY TRACT SYMPTOMS (LUTS): ICD-10-CM

## 2019-06-13 RX ORDER — TAMSULOSIN HYDROCHLORIDE 0.4 MG/1
CAPSULE ORAL
Qty: 90 CAPSULE | Refills: 2 | Status: SHIPPED | OUTPATIENT
Start: 2019-06-13 | End: 2019-12-12

## 2019-06-13 NOTE — TELEPHONE ENCOUNTER
"Requested Prescriptions   Pending Prescriptions Disp Refills     tamsulosin (FLOMAX) 0.4 MG capsule [Pharmacy Med Name: TAMSULOSIN 0.4MG CAPSULES] 90 capsule 0     Sig: TAKE 1 CAPSULE BY MOUTH EVERY DAY AFTER A MEAL       Alpha Blockers Passed - 6/12/2019  3:55 AM        Passed - Blood pressure under 140/90 in past 12 months     BP Readings from Last 3 Encounters:   06/04/19 118/74   06/04/19 118/74   04/18/19 120/72                 Passed - Recent (12 mo) or future (30 days) visit within the authorizing provider's specialty     Patient had office visit in the last 12 months or has a visit in the next 30 days with authorizing provider or within the authorizing provider's specialty.  See \"Patient Info\" tab in inbasket, or \"Choose Columns\" in Meds & Orders section of the refill encounter.              Passed - Patient does not have Tadalafil, Vardenafil, or Sildenafil on their medication list        Passed - Medication is active on med list        Passed - Patient is 18 years of age or older        LOV 03/15/19  Prescription approved per [facility] Refill Protocol.    "

## 2019-12-12 ENCOUNTER — OFFICE VISIT (OUTPATIENT)
Dept: INTERNAL MEDICINE | Facility: OTHER | Age: 84
End: 2019-12-12
Attending: INTERNAL MEDICINE
Payer: COMMERCIAL

## 2019-12-12 VITALS
RESPIRATION RATE: 16 BRPM | TEMPERATURE: 96.1 F | HEART RATE: 80 BPM | WEIGHT: 168.8 LBS | SYSTOLIC BLOOD PRESSURE: 118 MMHG | DIASTOLIC BLOOD PRESSURE: 82 MMHG | HEIGHT: 68 IN | BODY MASS INDEX: 25.58 KG/M2

## 2019-12-12 DIAGNOSIS — R73.9 HYPERGLYCEMIA: Primary | ICD-10-CM

## 2019-12-12 DIAGNOSIS — J32.0 CHRONIC MAXILLARY SINUSITIS: ICD-10-CM

## 2019-12-12 DIAGNOSIS — E78.5 HYPERLIPIDEMIA, UNSPECIFIED HYPERLIPIDEMIA TYPE: ICD-10-CM

## 2019-12-12 DIAGNOSIS — M19.91 PRIMARY OSTEOARTHRITIS, UNSPECIFIED SITE: ICD-10-CM

## 2019-12-12 DIAGNOSIS — I10 ESSENTIAL HYPERTENSION: ICD-10-CM

## 2019-12-12 DIAGNOSIS — I47.10 PAROXYSMAL SUPRAVENTRICULAR TACHYCARDIA (H): ICD-10-CM

## 2019-12-12 DIAGNOSIS — R39.9 LOWER URINARY TRACT SYMPTOMS (LUTS): ICD-10-CM

## 2019-12-12 LAB
ALBUMIN SERPL-MCNC: 4.4 G/DL (ref 3.5–5.7)
ALP SERPL-CCNC: 58 U/L (ref 34–104)
ALT SERPL W P-5'-P-CCNC: 15 U/L (ref 7–52)
ANION GAP SERPL CALCULATED.3IONS-SCNC: 8 MMOL/L (ref 3–14)
AST SERPL W P-5'-P-CCNC: 15 U/L (ref 13–39)
BILIRUB SERPL-MCNC: 1.5 MG/DL (ref 0.3–1)
BUN SERPL-MCNC: 19 MG/DL (ref 7–25)
CALCIUM SERPL-MCNC: 9.3 MG/DL (ref 8.6–10.3)
CHLORIDE SERPL-SCNC: 102 MMOL/L (ref 98–107)
CHOLEST SERPL-MCNC: 105 MG/DL
CO2 SERPL-SCNC: 29 MMOL/L (ref 21–31)
CREAT SERPL-MCNC: 0.91 MG/DL (ref 0.7–1.3)
GFR SERPL CREATININE-BSD FRML MDRD: 79 ML/MIN/{1.73_M2}
GLUCOSE SERPL-MCNC: 129 MG/DL (ref 70–105)
HBA1C MFR BLD: 5.4 % (ref 4–6)
HDLC SERPL-MCNC: 51 MG/DL (ref 23–92)
LDLC SERPL CALC-MCNC: 47 MG/DL
NONHDLC SERPL-MCNC: 54 MG/DL
POTASSIUM SERPL-SCNC: 3.8 MMOL/L (ref 3.5–5.1)
PROT SERPL-MCNC: 6.8 G/DL (ref 6.4–8.9)
SODIUM SERPL-SCNC: 139 MMOL/L (ref 134–144)
TRIGL SERPL-MCNC: 35 MG/DL

## 2019-12-12 PROCEDURE — G0463 HOSPITAL OUTPT CLINIC VISIT: HCPCS

## 2019-12-12 PROCEDURE — 99215 OFFICE O/P EST HI 40 MIN: CPT | Performed by: INTERNAL MEDICINE

## 2019-12-12 PROCEDURE — 80061 LIPID PANEL: CPT | Mod: ZL | Performed by: INTERNAL MEDICINE

## 2019-12-12 PROCEDURE — 36415 COLL VENOUS BLD VENIPUNCTURE: CPT | Mod: ZL | Performed by: INTERNAL MEDICINE

## 2019-12-12 PROCEDURE — 80053 COMPREHEN METABOLIC PANEL: CPT | Mod: ZL | Performed by: INTERNAL MEDICINE

## 2019-12-12 PROCEDURE — 83036 HEMOGLOBIN GLYCOSYLATED A1C: CPT | Mod: ZL | Performed by: INTERNAL MEDICINE

## 2019-12-12 RX ORDER — FINASTERIDE 5 MG/1
5 TABLET, FILM COATED ORAL DAILY
Qty: 90 TABLET | Refills: 3 | Status: SHIPPED | OUTPATIENT
Start: 2019-12-12 | End: 2020-12-14

## 2019-12-12 RX ORDER — HYDROCHLOROTHIAZIDE 25 MG/1
25 TABLET ORAL DAILY
Qty: 90 TABLET | Refills: 3 | Status: SHIPPED | OUTPATIENT
Start: 2019-12-12 | End: 2020-12-10

## 2019-12-12 RX ORDER — TAMSULOSIN HYDROCHLORIDE 0.4 MG/1
0.4 CAPSULE ORAL DAILY
Qty: 90 CAPSULE | Refills: 3 | Status: SHIPPED | OUTPATIENT
Start: 2019-12-12 | End: 2020-12-10

## 2019-12-12 SDOH — HEALTH STABILITY: MENTAL HEALTH: HOW OFTEN DO YOU HAVE A DRINK CONTAINING ALCOHOL?: 2-3 TIMES A WEEK

## 2019-12-12 SDOH — HEALTH STABILITY: MENTAL HEALTH: HOW MANY STANDARD DRINKS CONTAINING ALCOHOL DO YOU HAVE ON A TYPICAL DAY?: 1 OR 2

## 2019-12-12 ASSESSMENT — ENCOUNTER SYMPTOMS
EYE REDNESS: 0
ABDOMINAL DISTENTION: 0
FREQUENCY: 1
LIGHT-HEADEDNESS: 0
WHEEZING: 0
DIARRHEA: 0
WEAKNESS: 0
FLANK PAIN: 0
DIFFICULTY URINATING: 0
PALPITATIONS: 1
SPEECH DIFFICULTY: 0
HALLUCINATIONS: 0
NUMBNESS: 0
SEIZURES: 0
CHEST TIGHTNESS: 0
SORE THROAT: 0
NECK PAIN: 0
RHINORRHEA: 0
UNEXPECTED WEIGHT CHANGE: 0
SINUS PAIN: 0
COUGH: 0
TREMORS: 0
CHILLS: 0
BRUISES/BLEEDS EASILY: 0
HEMATURIA: 0
NERVOUS/ANXIOUS: 0
WOUND: 0
APPETITE CHANGE: 0
ABDOMINAL PAIN: 0
VOICE CHANGE: 0
SINUS PRESSURE: 0
VOMITING: 0
FEVER: 0
BLOOD IN STOOL: 0
NAUSEA: 0
DYSURIA: 0
TROUBLE SWALLOWING: 0
SHORTNESS OF BREATH: 0
ADENOPATHY: 0
DIAPHORESIS: 0
HEADACHES: 0
AGITATION: 0
JOINT SWELLING: 0
DIZZINESS: 0
FATIGUE: 0
CONSTIPATION: 0
BACK PAIN: 0
EYE PAIN: 0
SLEEP DISTURBANCE: 0
CONFUSION: 0
NECK STIFFNESS: 0

## 2019-12-12 ASSESSMENT — MIFFLIN-ST. JEOR: SCORE: 1415.17

## 2019-12-12 NOTE — NURSING NOTE
"The patient is here today to get a refill on his medications.  Maegan Payne LPN on 12/12/2019 at 10:38 AM  Chief Complaint   Patient presents with     Recheck Medication       Initial /82 (BP Location: Right arm, Patient Position: Sitting, Cuff Size: Adult Large)   Pulse 80   Temp 96.1  F (35.6  C) (Tympanic)   Resp 16   Ht 1.727 m (5' 8\")   Wt 76.6 kg (168 lb 12.8 oz)   BMI 25.67 kg/m   Estimated body mass index is 25.67 kg/m  as calculated from the following:    Height as of this encounter: 1.727 m (5' 8\").    Weight as of this encounter: 76.6 kg (168 lb 12.8 oz).  Medication Reconciliation: complete    Maegan Payne LPN    "

## 2019-12-12 NOTE — PROGRESS NOTES
Chief Complaint:  This patient is here for a comprehensive review of their multiple medical problems, renewal of medications and update on necessary health maintenance issues.      HPI: He is here today for complete evaluation and a review of his chronic medical problems.  He has a history of SVT and is being treated with diltiazem.  This was initially diagnosed on ZIO Patch monitor.  After being on the diltiazem, the amount of SVT on a subsequent ZIO Patch monitor was much less.  He is basically asymptomatic with this.  He is comfortable with just staying on the diltiazem as is.    The patient also has a history of significant lower urinary tract symptoms.  He is on Flomax as well as finasteride on a daily basis.  He still has nocturia x2 and has some urgency and diminished urine stream but he is accepting of his symptoms and does not feel that anything further needs to be done at this time.    He has a history of a lot of skin problems.  He sees the dermatologist on a regular basis.  He has a history of allergies and chronic sinusitis but this is more seasonal than anything.  He has a history of hyperglycemia and needs to have a recheck on that.    Medications are reconciled.  Past medical history, past surgical history, family history and social histories are reviewed and updated.  Immunizations are up-to-date other than the Shingrix.    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     Calculus of kidney     1977, 1989,Reoccured     Enlarged prostate with lower urinary tract symptoms (LUTS)     No Comments Provided     Essential (primary) hypertension     No Comments Provided     Osteoarthritis     No Comments Provided     Personal history of other malignant neoplasm of skin (CODE)     2010     Zoster without complications     2009       Past Surgical History:   Procedure Laterality Date     ARTHROSCOPY  SHOULDER Right 2013     COLONOSCOPY      2006,Repeat in 2011     COLONOSCOPY      2011,F/U 2021, if appropriate     EXTRACAPSULAR CATARACT EXTRATION WITH INTRAOCULAR LENS IMPLANT Bilateral      FRACTURE SURGERY      1977,Compression fracture of right tibia. Skiing accident, surgical repair     HERNIA REPAIR, INGUINAL RT/LT Left 2013    with mesh     HERNIA REPAIR, INGUINAL RT/LT Right 2014     SIGMOIDOSCOPY FLEXIBLE      1998,Normal       Current Outpatient Medications   Medication Sig Dispense Refill     ASPIRIN 81 PO Take 81 mg by mouth daily       diltiazem ER COATED BEADS (CARDIAZEM LA) 180 MG 24 hr tablet Take 1 tablet (180 mg) by mouth daily 90 tablet 3     finasteride (PROSCAR) 5 MG tablet Take 1 tablet (5 mg) by mouth daily 90 tablet 3     Glucosamine Sulfate 500 MG TABS Take 1,000 mg by mouth daily       hydrochlorothiazide (HYDRODIURIL) 25 MG tablet Take 1 tablet (25 mg) by mouth daily 90 tablet 3     ibuprofen (ADVIL/MOTRIN) 200 MG tablet Take 200 mg by mouth daily       loratadine (CLARITIN) 10 MG tablet Take 10 mg by mouth daily as needed       Multiple Vitamin (MULTI-VITAMINS) TABS Take 1 tablet by mouth daily       tamsulosin (FLOMAX) 0.4 MG capsule Take 1 capsule (0.4 mg) by mouth daily 90 capsule 3       Allergies   Allergen Reactions     Ace Inhibitors Cough     Iodine Hives       Family History   Problem Relation Age of Onset     Other - See Comments Father         Stroke, 94     Other - See Comments Mother         Old age     HIV/AIDS Brother         HIV     Other - See Comments Brother         While getting pacemaker     Cancer Sister         Cancer,In bones--left femur       Social History     Socioeconomic History     Marital status:      Spouse name: Not on file     Number of children: Not on file     Years of education: Not on file     Highest education level: Not on file   Occupational History     Not on file   Social Needs     Financial resource strain: Not on file     Food  insecurity:     Worry: Not on file     Inability: Not on file     Transportation needs:     Medical: Not on file     Non-medical: Not on file   Tobacco Use     Smoking status: Former Smoker     Types: Cigarettes     Last attempt to quit: 1954     Years since quittin.5     Smokeless tobacco: Never Used   Substance and Sexual Activity     Alcohol use: Yes     Alcohol/week: 4.2 standard drinks     Frequency: 2-3 times a week     Drinks per session: 1 or 2     Comment: Occasional     Drug use: No     Comment: Drug use: No     Sexual activity: Not on file   Lifestyle     Physical activity:     Days per week: Not on file     Minutes per session: Not on file     Stress: Not on file   Relationships     Social connections:     Talks on phone: Not on file     Gets together: Not on file     Attends Sabianist service: Not on file     Active member of club or organization: Not on file     Attends meetings of clubs or organizations: Not on file     Relationship status: Not on file     Intimate partner violence:     Fear of current or ex partner: Not on file     Emotionally abused: Not on file     Physically abused: Not on file     Forced sexual activity: Not on file   Other Topics Concern     Parent/sibling w/ CABG, MI or angioplasty before 65F 55M? Not Asked   Social History Narrative    , four children, retired from the [employer].  Lives in town.       Review of Systems   Constitutional: Negative for appetite change, chills, diaphoresis, fatigue, fever and unexpected weight change.   HENT: Negative for ear pain, rhinorrhea, sinus pressure, sinus pain, sore throat, trouble swallowing and voice change.    Eyes: Negative for pain, redness and visual disturbance.   Respiratory: Negative for cough, chest tightness, shortness of breath and wheezing.    Cardiovascular: Positive for palpitations. Negative for chest pain and leg swelling.   Gastrointestinal: Negative for abdominal distention, abdominal pain,  blood in stool, constipation, diarrhea, nausea and vomiting.   Endocrine: Negative for cold intolerance and heat intolerance.   Genitourinary: Positive for frequency and urgency. Negative for difficulty urinating, dysuria, flank pain and hematuria.   Musculoskeletal: Negative for back pain, joint swelling, neck pain and neck stiffness.   Skin: Negative for rash and wound.   Allergic/Immunologic: Negative for immunocompromised state.   Neurological: Negative for dizziness, tremors, seizures, syncope, speech difficulty, weakness, light-headedness, numbness and headaches.   Hematological: Negative for adenopathy. Does not bruise/bleed easily.   Psychiatric/Behavioral: Negative for agitation, behavioral problems, confusion, hallucinations and sleep disturbance. The patient is not nervous/anxious.        Physical Exam  Vitals signs and nursing note reviewed.   Constitutional:       General: He is not in acute distress.     Appearance: He is well-developed. He is not diaphoretic.   HENT:      Head: Normocephalic.      Right Ear: Tympanic membrane, ear canal and external ear normal.      Left Ear: Tympanic membrane, ear canal and external ear normal.      Nose: Nose normal.      Mouth/Throat:      Pharynx: No oropharyngeal exudate.   Eyes:      Conjunctiva/sclera: Conjunctivae normal.      Pupils: Pupils are equal, round, and reactive to light.   Neck:      Musculoskeletal: Normal range of motion and neck supple.      Thyroid: No thyroid mass or thyromegaly.      Vascular: Normal carotid pulses. No carotid bruit or JVD.      Trachea: No tracheal deviation.   Cardiovascular:      Rate and Rhythm: Normal rate and regular rhythm.      Heart sounds: Murmur present. Systolic murmur present with a grade of 2/6. No friction rub. No gallop.       Comments: Right upper sternal border  Pulmonary:      Effort: Pulmonary effort is normal. No respiratory distress.      Breath sounds: Normal breath sounds. No stridor. No decreased  breath sounds, wheezing, rhonchi or rales.   Abdominal:      General: Bowel sounds are normal. There is no distension.      Palpations: Abdomen is soft. There is no mass.      Tenderness: There is no abdominal tenderness. There is no guarding or rebound.      Hernia: No hernia is present.   Genitourinary:     Penis: Normal.       Scrotum/Testes: Normal.      Prostate: Normal.      Rectum: Normal.   Musculoskeletal: Normal range of motion.      Right lower leg: No edema.      Left lower leg: No edema.   Lymphadenopathy:      Cervical: No cervical adenopathy.   Skin:     General: Skin is warm and dry.      Findings: No rash.   Neurological:      Mental Status: He is alert and oriented to person, place, and time.      Cranial Nerves: No cranial nerve deficit.      Sensory: No sensory deficit.      Motor: No abnormal muscle tone.      Coordination: Coordination normal.      Deep Tendon Reflexes: Reflexes normal.         Assessment:      ICD-10-CM    1. Hyperglycemia R73.9 Hemoglobin A1c     Hemoglobin A1c   2. Essential hypertension I10 hydrochlorothiazide (HYDRODIURIL) 25 MG tablet   3. Lower urinary tract symptoms (LUTS) R39.9 tamsulosin (FLOMAX) 0.4 MG capsule     finasteride (PROSCAR) 5 MG tablet   4. Primary osteoarthritis, unspecified site M19.91    5. Chronic maxillary sinusitis J32.0    6. Paroxysmal supraventricular tachycardia (H) I47.1 diltiazem ER COATED BEADS (CARDIAZEM LA) 180 MG 24 hr tablet     Comprehensive Metabolic Panel     Comprehensive Metabolic Panel   7. Hyperlipidemia, unspecified hyperlipidemia type E78.5 Lipid Panel     Lipid Panel        Plan: Overall he appears to be doing well.  He is tolerating the palpitations so I do not think we need to do anything more with his diltiazem at this time.  His urinary symptoms are satisfactory.  Therefore, his medications will continue without any change for the present time.  They were all refilled for 1 year.  Complete lab drawn and pending, I will send  him a letter with the results.  Assuming all goes well, he will follow-up with me on an annual basis or anytime sooner if there are problems.  He is going to look into getting the Shingrix vaccine.

## 2019-12-12 NOTE — LETTER
December 13, 2019      Jeffrey Cespedes  [address] [facility] [address]        Dear Jeffrey Cespedes,    Below are the results of your recent labs:    Results for orders placed or performed in visit on 12/12/19   Hemoglobin A1c     Status: None   Result Value Ref Range    Hemoglobin A1C 5.4 4.0 - 6.0 %   Lipid Panel     Status: None   Result Value Ref Range    Cholesterol 105 <200 mg/dL    Triglycerides 35 <150 mg/dL    HDL Cholesterol 51 23 - 92 mg/dL    LDL Cholesterol Calculated 47 <100 mg/dL    Non HDL Cholesterol 54 <130 mg/dL   Comprehensive Metabolic Panel     Status: Abnormal   Result Value Ref Range    Sodium 139 134 - 144 mmol/L    Potassium 3.8 3.5 - 5.1 mmol/L    Chloride 102 98 - 107 mmol/L    Carbon Dioxide 29 21 - 31 mmol/L    Anion Gap 8 3 - 14 mmol/L    Glucose 129 (H) 70 - 105 mg/dL    Urea Nitrogen 19 7 - 25 mg/dL    Creatinine 0.91 0.70 - 1.30 mg/dL    GFR Estimate 79 >60 mL/min/[1.73_m2]    GFR Estimate If Black >90 >60 mL/min/[1.73_m2]    Calcium 9.3 8.6 - 10.3 mg/dL    Bilirubin Total 1.5 (H) 0.3 - 1.0 mg/dL    Albumin 4.4 3.5 - 5.7 g/dL    Protein Total 6.8 6.4 - 8.9 g/dL    Alkaline Phosphatase 58 34 - 104 U/L    ALT 15 7 - 52 U/L    AST 15 13 - 39 U/L        Your blood tests look great.  Your sugar or glucose level is a little bit high but the A1c which is a diabetes test is normal.  I am satisfied with your results.  Congratulations on this report.    Sincerely,        Cody Souza MD  Internal Medicine  [facility]

## 2020-01-30 DIAGNOSIS — R39.9 LOWER URINARY TRACT SYMPTOMS (LUTS): ICD-10-CM

## 2020-01-30 RX ORDER — FINASTERIDE 5 MG/1
TABLET, FILM COATED ORAL
Qty: 90 TABLET | Refills: 3 | OUTPATIENT
Start: 2020-01-30

## 2020-04-23 RX ORDER — FLUTICASONE PROPIONATE 50 MCG
SPRAY, SUSPENSION (ML) NASAL
Qty: 16 G | OUTPATIENT
Start: 2020-04-23

## 2020-06-04 DIAGNOSIS — I47.10 PAROXYSMAL SUPRAVENTRICULAR TACHYCARDIA (H): Primary | ICD-10-CM

## 2020-06-05 ENCOUNTER — TELEPHONE (OUTPATIENT)
Dept: CARDIOLOGY | Facility: CLINIC | Age: 85
End: 2020-06-05

## 2020-06-05 NOTE — TELEPHONE ENCOUNTER
Called patient first and last name was on voicemail, left detailed message in regards to orders place for the Zio patch and the Echocardiogram. Scheduling will be calling to set up appointments.  Also will set up telemedicine visit after tests are done.   Shannon Mullen RN on 6/5/2020 at 7:36 AM      Zahira Zayas APRN CNP Green, Glenda M, RN               I placed orders   Thanks!   LVW      From: Shannon Mullen RN   Sent: 6/4/2020   4:18 PM CDT   To: DOMI Gibson CNP   Subject: FW: Nisha f/u June 2020                         Patient is needing a 1 year follow up needs order for the Zio patch and Echocardiogram placed please and thank you.  Shannon

## 2020-06-24 ENCOUNTER — HOSPITAL ENCOUNTER (OUTPATIENT)
Dept: CARDIOLOGY | Facility: OTHER | Age: 85
End: 2020-06-24
Attending: INTERNAL MEDICINE
Payer: MEDICARE

## 2020-06-24 ENCOUNTER — HOSPITAL ENCOUNTER (OUTPATIENT)
Dept: CARDIOLOGY | Facility: OTHER | Age: 85
End: 2020-06-24
Attending: NURSE PRACTITIONER
Payer: MEDICARE

## 2020-06-24 DIAGNOSIS — I47.10 PAROXYSMAL SUPRAVENTRICULAR TACHYCARDIA (H): ICD-10-CM

## 2020-06-24 DIAGNOSIS — R00.0 TACHYCARDIA: ICD-10-CM

## 2020-06-24 PROCEDURE — 93306 TTE W/DOPPLER COMPLETE: CPT | Mod: 26 | Performed by: INTERNAL MEDICINE

## 2020-06-24 PROCEDURE — 0296T ZIO PATCH HOLTER ADULT PEDIATRIC GREATER THAN 48 HRS: CPT

## 2020-06-24 PROCEDURE — 93306 TTE W/DOPPLER COMPLETE: CPT

## 2020-06-24 PROCEDURE — 0298T ZIO PATCH HOLTER ADULT PEDIATRIC GREATER THAN 48 HRS: CPT | Performed by: INTERNAL MEDICINE

## 2020-06-24 NOTE — PATIENT INSTRUCTIONS
Patient instructed not to:   -take baths, swim, sauna   -remove device prior to 14 days   -use electric blankets   -shower or sweat excessively within first 24 hours of device application  Patient instructed to:   -shower as needed   -be carefull when toweling off and dressing   -press button when cardiac symptoms occur   -document symptoms in log book   -remove and return device (send via mail to [facility])   -Call [facility] with any questions

## 2020-07-16 ENCOUNTER — TELEPHONE (OUTPATIENT)
Dept: CARDIOLOGY | Facility: CLINIC | Age: 85
End: 2020-07-16

## 2020-07-16 NOTE — TELEPHONE ENCOUNTER
After verification of name and date of birth, talked with patient made appointment for 07/21/2020 0730 with nurse and 0800 with Dr. Cameron via telemedicine . Patient is aware of the SVT of 5 hours and 7 minutes on monitor per Zahira George CNP.  Patient is also aware of date and of Telemedicine appointment. Patient verbalized understanding.  Shannon Mullen RN on 7/16/2020 at 10:21 AM

## 2020-07-16 NOTE — TELEPHONE ENCOUNTER
----- Message from DOMI Gibson CNP sent at 7/16/2020  9:32 AM CDT -----  SVT up to 5 hours 7 minutes noted on monitoring. He is due to follow up with Dr. Cameron via Telemedicine, let us know which date he would like. Thanks!

## 2020-07-21 ENCOUNTER — OFFICE VISIT (OUTPATIENT)
Dept: CARDIOLOGY | Facility: CLINIC | Age: 85
End: 2020-07-21
Attending: INTERNAL MEDICINE
Payer: MEDICARE

## 2020-07-21 ENCOUNTER — ALLIED HEALTH/NURSE VISIT (OUTPATIENT)
Dept: CARDIOLOGY | Facility: OTHER | Age: 85
End: 2020-07-21
Attending: INTERNAL MEDICINE
Payer: COMMERCIAL

## 2020-07-21 VITALS
HEART RATE: 86 BPM | BODY MASS INDEX: 25.4 KG/M2 | HEIGHT: 68 IN | WEIGHT: 167.6 LBS | DIASTOLIC BLOOD PRESSURE: 62 MMHG | RESPIRATION RATE: 16 BRPM | OXYGEN SATURATION: 98 % | TEMPERATURE: 97.5 F | SYSTOLIC BLOOD PRESSURE: 116 MMHG

## 2020-07-21 DIAGNOSIS — I47.10 PAROXYSMAL SUPRAVENTRICULAR TACHYCARDIA (H): Primary | ICD-10-CM

## 2020-07-21 DIAGNOSIS — I10 ESSENTIAL HYPERTENSION: ICD-10-CM

## 2020-07-21 PROCEDURE — 99207 ZZC NO CHARGE NURSE ONLY: CPT

## 2020-07-21 PROCEDURE — 99214 OFFICE O/P EST MOD 30 MIN: CPT | Mod: ZP | Performed by: INTERNAL MEDICINE

## 2020-07-21 ASSESSMENT — PAIN SCALES - GENERAL: PAINLEVEL: MILD PAIN (2)

## 2020-07-21 ASSESSMENT — MIFFLIN-ST. JEOR: SCORE: 1404.6

## 2020-07-21 NOTE — LETTER
"7/21/2020      RE: Jeffrey Cespedes  [address] [facility] [id number]       Dear Colleague,    Thank you for the opportunity to participate in the care of your patient, Jeffrey Cespedes, at the [facility]. Please see a copy of my visit note below.    Electrophysiology Telemedicine Clinic Note  HPI:   Mr. Cespedes is an 87 year old male who has a past medical history significant for HTN, hyperglycemia, OA, BPH, chronic maxillary sinusitis, and PSVT. He was referred today for evaluation and management of SVT.     Patient started developing palpitations and \"fast heartbeat\" in 1/2019. He described the episodes as intermittent, coming and going throughout the day. He saw his PCP and was noted to have some SVT vs AF. Therefore, he had an echo and a zio patch ordered. Echo showed normal structure and function. A zio patch monitor from 2/14/19-2/27/19 showed SR with 93 SVT episodes up to 13 hours 4 minutes with 3 symptom activations (1 in SR 2 in SVT). He was started on Diltiazem 180 mg CD daily. He had normal TSH, electrolytes and renal function. He continued to have occasional fluttering/palpitations. He has history of HTN and has been on hydrochlorothiazide, he has otherwise been quite healthy. No other cardiac history.     EP Telemed Visit 4/18/19: He states since starting the diltiazem his symptoms have significantly improved. He sometimes has a pulse rate up to around 90 bpms for awhile, but then goes back to around 60 bpm. He is not overly symptomatic with this. denies any chest pain/pressures, dizziness, lightheadedness, worsening shortness of breath, leg/ankle swelling, PND, orthopnea, palpitations, or syncopal symptoms. Current cardiac medications include: Diltiazem, hydrochlorothiazide, and ASA.     Ep Telemed Visit 6/4/19: He presents today for follow up. A repeat zio patch monitor on diltiazem from 4/29/19-5/12/19 showed SR with 56 SVT up to 6 hours " 15 minutes and NSVT up to 10.2 seconds. No symptom activations. He reports feeling well. He states he has some decreased stamina, but this has proceeded starting diltiazem or his prior symptoms of palpitations.  He denies any chest pain/pressures, dizziness, lightheadedness, worsening shortness of breath, leg/ankle swelling, PND, orthopnea, palpitations, or syncopal symptoms. Current cardiac medications include: Diltiazem, hydrochlorothiazide, and ASA.     He is following up today. He reports feeling well. He denies any chest pain/pressures, dizziness, lightheadedness, worsening shortness of breath, leg/ankle swelling, PND, orthopnea, palpitations, or syncopal symptoms. Zio patch from 6/24/20-7/8/20 showed SR with 65 SVT episodes up to 5 hours 7 minutes and rare isolated ectopy. 1 symptom activation was reported to be pressed in error by patient. Updated recent echo showed normal structure and function. Current cardiac medications include: Diltiazem, hydrochlorothiazide, and ASA.         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     Calculus of kidney     1977, 1989,Reoccured     Enlarged prostate with lower urinary tract symptoms (LUTS)     No Comments Provided     Essential (primary) hypertension     No Comments Provided     Osteoarthritis     No Comments Provided     Personal history of other malignant neoplasm of skin (CODE)     2010     Zoster without complications     2009       CURRENT MEDICATIONS:  Current Outpatient Medications   Medication Sig Dispense Refill     ASPIRIN 81 PO Take 81 mg by mouth daily       diltiazem ER COATED BEADS (CARDIAZEM LA) 180 MG 24 hr tablet Take 1 tablet (180 mg) by mouth daily 90 tablet 3     finasteride (PROSCAR) 5 MG tablet Take 1 tablet (5 mg) by mouth daily 90 tablet 3     Glucosamine Sulfate 500 MG TABS Take 1,000 mg by mouth daily        hydrochlorothiazide (HYDRODIURIL) 25 MG tablet Take 1 tablet (25 mg) by mouth daily 90 tablet 3     ibuprofen (ADVIL/MOTRIN) 200 MG tablet Take 200 mg by mouth daily       loratadine (CLARITIN) 10 MG tablet Take 10 mg by mouth daily as needed       Multiple Vitamin (MULTI-VITAMINS) TABS Take 1 tablet by mouth daily       tamsulosin (FLOMAX) 0.4 MG capsule Take 1 capsule (0.4 mg) by mouth daily 90 capsule 3       PAST SURGICAL HISTORY:  Past Surgical History:   Procedure Laterality Date     ARTHROSCOPY SHOULDER Right      COLONOSCOPY      ,Repeat in      COLONOSCOPY      ,F/U , if appropriate     EXTRACAPSULAR CATARACT EXTRATION WITH INTRAOCULAR LENS IMPLANT Bilateral      FRACTURE SURGERY      ,Compression fracture of right tibia. Skiing accident, surgical repair     HERNIA REPAIR, INGUINAL RT/LT Left     with mesh     HERNIA REPAIR, INGUINAL RT/LT Right      SIGMOIDOSCOPY FLEXIBLE      ,Normal       ALLERGIES:     Allergies   Allergen Reactions     Ace Inhibitors Cough     Iodine Hives       FAMILY HISTORY:  Family History   Problem Relation Age of Onset     Other - See Comments Father         Stroke, 94     Other - See Comments Mother         Old age     HIV/AIDS Brother         HIV     Other - See Comments Brother         While getting pacemaker     Cancer Sister         Cancer,In bones--left femur       SOCIAL HISTORY:  Social History     Tobacco Use     Smoking status: Former Smoker     Types: Cigarettes     Last attempt to quit: 1954     Years since quittin.1     Smokeless tobacco: Never Used   Substance Use Topics     Alcohol use: Yes     Alcohol/week: 4.2 standard drinks     Frequency: 2-3 times a week     Drinks per session: 1 or 2     Comment: Occasional     Drug use: No     Comment: Drug use: No       ROS:   A comprehensive 10 point review of systems negative other than as mentioned in HPI.  Exam:  Initial /62 (BP Location: Right arm, Patient Position:  "Sitting, Cuff Size: Adult Regular)   Pulse 86   Temp 97.5  F (36.4  C) (Tympanic)   Resp 16   Ht 1.727 m (5' 7.99\")   Wt 76 kg (167 lb 9.6 oz)   SpO2 98%   BMI 25.49 kg/m   Estimated body mass index is 25.49 kg/m  as calculated from the following:Height as of this encounter: 1.727 m (5' 7.99\").Weight as of this encounter: 76 kg (167 lb 9.6 oz).  Exam assisted by local RN:  GENERAL APPEARANCE: alert and no distress  HEENT: MMM, PERRLA.   NECK: supple.   RESPIRATORY: lungs clear to auscultation, normal respiratory rate  CARDIOVASCULAR: regular, no murmur.   ABDOMEN: soft, non tender, bowel sounds normal, non-distended  EXTREMITIES: peripheral pulses normal, no edema  NEURO: alert and oriented to person/place/time, normal speech, gait and affect  SKIN: no ecchymoses, no rashes  PSYCH: normal affect, cooperative    Labs:  Reviewed.     Testing/Procedures:  2/2019 ECHOCARDIOGRAM:   Interpretation Summary  Global and regional left ventricular function is normal with an EF of 60-65%.  Right ventricular function, chamber size, wall motion, and thickness are  normal.  The inferior vena cava was normal in size with preserved respiratory  variability.  No pericardial effusion is present.    6/24/20 ECHOCARDIOGRAM:  Interpretation Summary  Global and regional left ventricular function is normal with an EF of 55-60%.  Right ventricular function, chamber size, wall motion, and thickness are  normal.  Pulmonary artery systolic pressure is normal.  The inferior vena cava is normal.  No pericardial effusion is present.  Previous study not available for comparison.  Assessment and Plan:   Mr. Cespedes is an 87 year old male who has a past medical history significant for HTN, hyperglycemia, OA, BPH, chronic maxillary sinusitis, and PSVT. Patient started developing palpitations and \"fast heartbeat\" in 1/2019. Echo showed normal structure and function. A zio patch monitor from 2/14/19-2/27/19 showed SR with 93 SVT episodes up to 13 " hours 4 minutes with 3 symptom activations (1 in SR 2 in SVT). He was started on Diltiazem 180 mg CD daily and his symptoms have significantly improved. He is following up today. He reports feeling well. He denies any chest pain/pressures, dizziness, lightheadedness, worsening shortness of breath, leg/ankle swelling, PND, orthopnea, palpitations, or syncopal symptoms. Zio patch from 6/24/20-7/8/20 showed SR with 65 SVT episodes up to 5 hours 7 minutes and rare isolated ectopy. 1 symptom activation was reported to be pressed in error by patient. Updated recent echo showed normal structure and function. Current cardiac medications include: Diltiazem, hydrochlorothiazide, and ASA.     Supraventricular Tachycardia:   His two zio patch shows a long RP tachycardia, most c/w an A.Tach. We again discussed various options for treatment of SVT. This is not a life threatening arrhythmia. Treatment is indicated primarily for relief of symptoms. Medications such as calcium channel blockers or beta blockers in some cases reduce the frequency and duration of the episodes but they will not cure it. The other option discussed was an electrophysiology study (EPS) and possible ablation.. He has been feeling well with the addition of diltiazem. He is not overly bothered by this. He would prefer conservative management for now. We will continue with diltiazem and then have him follow up via telemedicine with zio patch in 1 year.      The patient states understanding and is agreeable with plan.   Dameon Cameron MD [facility]  Cardiology - Electrophysiology      Please do not hesitate to contact me if you have any questions/concerns.     Sincerely,     Dameon Cameron MD

## 2020-07-21 NOTE — PROGRESS NOTES
"Electrophysiology Telemedicine Clinic Note  HPI:   Mr. Cespedes is an 87 year old male who has a past medical history significant for HTN, hyperglycemia, OA, BPH, chronic maxillary sinusitis, and PSVT. He was referred today for evaluation and management of SVT.     Patient started developing palpitations and \"fast heartbeat\" in 1/2019. He described the episodes as intermittent, coming and going throughout the day. He saw his PCP and was noted to have some SVT vs AF. Therefore, he had an echo and a zio patch ordered. Echo showed normal structure and function. A zio patch monitor from 2/14/19-2/27/19 showed SR with 93 SVT episodes up to 13 hours 4 minutes with 3 symptom activations (1 in SR 2 in SVT). He was started on Diltiazem 180 mg CD daily. He had normal TSH, electrolytes and renal function. He continued to have occasional fluttering/palpitations. He has history of HTN and has been on hydrochlorothiazide, he has otherwise been quite healthy. No other cardiac history.     EP Telemed Visit 4/18/19: He states since starting the diltiazem his symptoms have significantly improved. He sometimes has a pulse rate up to around 90 bpms for awhile, but then goes back to around 60 bpm. He is not overly symptomatic with this. denies any chest pain/pressures, dizziness, lightheadedness, worsening shortness of breath, leg/ankle swelling, PND, orthopnea, palpitations, or syncopal symptoms. Current cardiac medications include: Diltiazem, hydrochlorothiazide, and ASA.     Ep Telemed Visit 6/4/19: He presents today for follow up. A repeat zio patch monitor on diltiazem from 4/29/19-5/12/19 showed SR with 56 SVT up to 6 hours 15 minutes and NSVT up to 10.2 seconds. No symptom activations. He reports feeling well. He states he has some decreased stamina, but this has proceeded starting diltiazem or his prior symptoms of palpitations.  He denies any chest pain/pressures, dizziness, lightheadedness, worsening shortness of breath, " leg/ankle swelling, PND, orthopnea, palpitations, or syncopal symptoms. Current cardiac medications include: Diltiazem, hydrochlorothiazide, and ASA.     He is following up today. He reports feeling well. He denies any chest pain/pressures, dizziness, lightheadedness, worsening shortness of breath, leg/ankle swelling, PND, orthopnea, palpitations, or syncopal symptoms. Zio patch from 6/24/20-7/8/20 showed SR with 65 SVT episodes up to 5 hours 7 minutes and rare isolated ectopy. 1 symptom activation was reported to be pressed in error by patient. Updated recent echo showed normal structure and function. Current cardiac medications include: Diltiazem, hydrochlorothiazide, and ASA.         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     Calculus of kidney     1977, 1989,Reoccured     Enlarged prostate with lower urinary tract symptoms (LUTS)     No Comments Provided     Essential (primary) hypertension     No Comments Provided     Osteoarthritis     No Comments Provided     Personal history of other malignant neoplasm of skin (CODE)     2010     Zoster without complications     2009       CURRENT MEDICATIONS:  Current Outpatient Medications   Medication Sig Dispense Refill     ASPIRIN 81 PO Take 81 mg by mouth daily       diltiazem ER COATED BEADS (CARDIAZEM LA) 180 MG 24 hr tablet Take 1 tablet (180 mg) by mouth daily 90 tablet 3     finasteride (PROSCAR) 5 MG tablet Take 1 tablet (5 mg) by mouth daily 90 tablet 3     Glucosamine Sulfate 500 MG TABS Take 1,000 mg by mouth daily       hydrochlorothiazide (HYDRODIURIL) 25 MG tablet Take 1 tablet (25 mg) by mouth daily 90 tablet 3     ibuprofen (ADVIL/MOTRIN) 200 MG tablet Take 200 mg by mouth daily       loratadine (CLARITIN) 10 MG tablet Take 10 mg by mouth daily as needed       Multiple Vitamin (MULTI-VITAMINS) TABS Take 1 tablet by mouth  "daily       tamsulosin (FLOMAX) 0.4 MG capsule Take 1 capsule (0.4 mg) by mouth daily 90 capsule 3       PAST SURGICAL HISTORY:  Past Surgical History:   Procedure Laterality Date     ARTHROSCOPY SHOULDER Right      COLONOSCOPY      ,Repeat in      COLONOSCOPY      ,F/U , if appropriate     EXTRACAPSULAR CATARACT EXTRATION WITH INTRAOCULAR LENS IMPLANT Bilateral      FRACTURE SURGERY      ,Compression fracture of right tibia. Skiing accident, surgical repair     HERNIA REPAIR, INGUINAL RT/LT Left     with mesh     HERNIA REPAIR, INGUINAL RT/LT Right      SIGMOIDOSCOPY FLEXIBLE      ,Normal       ALLERGIES:     Allergies   Allergen Reactions     Ace Inhibitors Cough     Iodine Hives       FAMILY HISTORY:  Family History   Problem Relation Age of Onset     Other - See Comments Father         Stroke, 94     Other - See Comments Mother         Old age     HIV/AIDS Brother         HIV     Other - See Comments Brother         While getting pacemaker     Cancer Sister         Cancer,In bones--left femur       SOCIAL HISTORY:  Social History     Tobacco Use     Smoking status: Former Smoker     Types: Cigarettes     Last attempt to quit: 1954     Years since quittin.1     Smokeless tobacco: Never Used   Substance Use Topics     Alcohol use: Yes     Alcohol/week: 4.2 standard drinks     Frequency: 2-3 times a week     Drinks per session: 1 or 2     Comment: Occasional     Drug use: No     Comment: Drug use: No       ROS:   A comprehensive 10 point review of systems negative other than as mentioned in HPI.  Exam:  Initial /62 (BP Location: Right arm, Patient Position: Sitting, Cuff Size: Adult Regular)   Pulse 86   Temp 97.5  F (36.4  C) (Tympanic)   Resp 16   Ht 1.727 m (5' 7.99\")   Wt 76 kg (167 lb 9.6 oz)   SpO2 98%   BMI 25.49 kg/m   Estimated body mass index is 25.49 kg/m  as calculated from the following:Height as of this encounter: 1.727 m (5' 7.99\").Weight as " "of this encounter: 76 kg (167 lb 9.6 oz).  Exam assisted by local RN:  GENERAL APPEARANCE: alert and no distress  HEENT: MIKAYLAM, YOUSUF.   NECK: supple.   RESPIRATORY: lungs clear to auscultation, normal respiratory rate  CARDIOVASCULAR: regular, no murmur.   ABDOMEN: soft, non tender, bowel sounds normal, non-distended  EXTREMITIES: peripheral pulses normal, no edema  NEURO: alert and oriented to person/place/time, normal speech, gait and affect  SKIN: no ecchymoses, no rashes  PSYCH: normal affect, cooperative    Labs:  Reviewed.     Testing/Procedures:  2/2019 ECHOCARDIOGRAM:   Interpretation Summary  Global and regional left ventricular function is normal with an EF of 60-65%.  Right ventricular function, chamber size, wall motion, and thickness are  normal.  The inferior vena cava was normal in size with preserved respiratory  variability.  No pericardial effusion is present.    6/24/20 ECHOCARDIOGRAM:  Interpretation Summary  Global and regional left ventricular function is normal with an EF of 55-60%.  Right ventricular function, chamber size, wall motion, and thickness are  normal.  Pulmonary artery systolic pressure is normal.  The inferior vena cava is normal.  No pericardial effusion is present.  Previous study not available for comparison.  Assessment and Plan:   Mr. Cespedes is an 87 year old male who has a past medical history significant for HTN, hyperglycemia, OA, BPH, chronic maxillary sinusitis, and PSVT. Patient started developing palpitations and \"fast heartbeat\" in 1/2019. Echo showed normal structure and function. A zio patch monitor from 2/14/19-2/27/19 showed SR with 93 SVT episodes up to 13 hours 4 minutes with 3 symptom activations (1 in SR 2 in SVT). He was started on Diltiazem 180 mg CD daily and his symptoms have significantly improved. He is following up today. He reports feeling well. He denies any chest pain/pressures, dizziness, lightheadedness, worsening shortness of breath, leg/ankle " swelling, PND, orthopnea, palpitations, or syncopal symptoms. Zio patch from 6/24/20-7/8/20 showed SR with 65 SVT episodes up to 5 hours 7 minutes and rare isolated ectopy. 1 symptom activation was reported to be pressed in error by patient. Updated recent echo showed normal structure and function. Current cardiac medications include: Diltiazem, hydrochlorothiazide, and ASA.     Supraventricular Tachycardia:   His two zio patch shows a long RP tachycardia, most c/w an A.Tach. We again discussed various options for treatment of SVT. This is not a life threatening arrhythmia. Treatment is indicated primarily for relief of symptoms. Medications such as calcium channel blockers or beta blockers in some cases reduce the frequency and duration of the episodes but they will not cure it. The other option discussed was an electrophysiology study (EPS) and possible ablation.. He has been feeling well with the addition of diltiazem. He is not overly bothered by this. He would prefer conservative management for now. We will continue with diltiazem and then have him follow up via telemedicine with zio patch in 1 year.      The patient states understanding and is agreeable with plan.   Dameon Cameron MD [facility]  Cardiology - Electrophysiology

## 2020-07-21 NOTE — NURSING NOTE
"/62 (BP Location: Right arm, Patient Position: Sitting, Cuff Size: Adult Regular)   Pulse 86   Temp 97.5  F (36.4  C) (Tympanic)   Resp 16   Ht 1.727 m (5' 7.99\")   Wt 76 kg (167 lb 9.6 oz)   SpO2 98%   BMI 25.49 kg/m    Meds Reconciled: complete  Pt is on Aspirin  Pt is not on a Statin  PHQ and/or YOAV reviewed. Pt referred to PCP/MH Provider as appropriate.    Shannon Mullen RN            "

## 2020-07-21 NOTE — NURSING NOTE
"Follow up with Dr. Cameron on Zio patch and echocardiogram.  Patient states 1 year follow up.  Denies chest pain/pressure, shortness of breath, lightheadedness, nausea, increased fatigue, syncope or pre-syncope.  Lung sounds clear bilateral.  Shannon Mullen RN on 7/21/2020 at 7:47 AM    Chief Complaint   Patient presents with     Telemedicine consult       Initial /62 (BP Location: Right arm, Patient Position: Sitting, Cuff Size: Adult Regular)   Pulse 86   Temp 97.5  F (36.4  C) (Tympanic)   Resp 16   Ht 1.727 m (5' 7.99\")   Wt 76 kg (167 lb 9.6 oz)   SpO2 98%   BMI 25.49 kg/m   Estimated body mass index is 25.49 kg/m  as calculated from the following:    Height as of this encounter: 1.727 m (5' 7.99\").    Weight as of this encounter: 76 kg (167 lb 9.6 oz).  Meds Reconciled: complete  Pt is on Aspirin  Pt is not on a Statin  PHQ and/or YOAV reviewed. Pt referred to PCP/MH Provider as appropriate.    Shannon Mullen RN   Plan to proceed with a Zio patch in 1 year 2021.  Shannon Mullen RN on 7/21/2020 at 8:18 AM              "

## 2020-07-27 ENCOUNTER — HOSPITAL ENCOUNTER (OUTPATIENT)
Dept: GENERAL RADIOLOGY | Facility: OTHER | Age: 85
End: 2020-07-27
Attending: INTERNAL MEDICINE
Payer: MEDICARE

## 2020-07-27 ENCOUNTER — OFFICE VISIT (OUTPATIENT)
Dept: INTERNAL MEDICINE | Facility: OTHER | Age: 85
End: 2020-07-27
Attending: INTERNAL MEDICINE
Payer: MEDICARE

## 2020-07-27 VITALS
SYSTOLIC BLOOD PRESSURE: 110 MMHG | HEART RATE: 85 BPM | BODY MASS INDEX: 25.15 KG/M2 | OXYGEN SATURATION: 97 % | DIASTOLIC BLOOD PRESSURE: 62 MMHG | RESPIRATION RATE: 16 BRPM | TEMPERATURE: 98.3 F | WEIGHT: 165.4 LBS

## 2020-07-27 DIAGNOSIS — G89.29 CHRONIC PAIN OF LEFT KNEE: ICD-10-CM

## 2020-07-27 DIAGNOSIS — J01.00 ACUTE NON-RECURRENT MAXILLARY SINUSITIS: ICD-10-CM

## 2020-07-27 DIAGNOSIS — M25.562 CHRONIC PAIN OF LEFT KNEE: Primary | ICD-10-CM

## 2020-07-27 DIAGNOSIS — G89.29 CHRONIC PAIN OF LEFT KNEE: Primary | ICD-10-CM

## 2020-07-27 DIAGNOSIS — M25.562 CHRONIC PAIN OF LEFT KNEE: ICD-10-CM

## 2020-07-27 PROCEDURE — 73562 X-RAY EXAM OF KNEE 3: CPT | Mod: LT

## 2020-07-27 PROCEDURE — G0463 HOSPITAL OUTPT CLINIC VISIT: HCPCS | Mod: 25

## 2020-07-27 PROCEDURE — 99214 OFFICE O/P EST MOD 30 MIN: CPT | Performed by: INTERNAL MEDICINE

## 2020-07-27 PROCEDURE — G0463 HOSPITAL OUTPT CLINIC VISIT: HCPCS

## 2020-07-27 RX ORDER — AMOXICILLIN 875 MG
875 TABLET ORAL 2 TIMES DAILY
Qty: 20 TABLET | Refills: 0 | Status: SHIPPED | OUTPATIENT
Start: 2020-07-27 | End: 2020-12-14

## 2020-07-27 ASSESSMENT — PAIN SCALES - GENERAL: PAINLEVEL: EXTREME PAIN (8)

## 2020-07-27 ASSESSMENT — ENCOUNTER SYMPTOMS
ENDOCRINE NEGATIVE: 1
ALLERGIC/IMMUNOLOGIC NEGATIVE: 1
CONSTITUTIONAL NEGATIVE: 1
HEMATOLOGIC/LYMPHATIC NEGATIVE: 1

## 2020-07-27 NOTE — PROGRESS NOTES
Chief Complaint:  Sinus and knee issues.    HPI: He is in today for a few things.  First of all, he feels as though he might have a sinus infection.  He has pain in the region of the left maxillary sinus.  It is been there for probably the last 4 weeks or so.  A couple of years ago he went to the dentist with similar symptoms and the dentist told him it was a sinus infection and treated him with amoxicillin.  He does get seasonal allergies for which she will take Claritin as needed but that is more in the spring rather than in the summertime as it is now.  He has not had a fever with this.  He has no symptoms on the right side.    He has also been having troubles with his knees.  The left is more bothersome than the right.  This is a chronic and progressive problem for him.  No recent injury.  Recently the pain in the left knee was at a 7 out of 10 but it has since gotten somewhat better.  He is getting more and more debilitated with this discomfort.    We talked about his SVT.  He was seen by cardiology and it was elected to do nothing further other than continue with diltiazem and reassess in 1 year.    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     Calculus of kidney     1977, 1989,Reoccured     Enlarged prostate with lower urinary tract symptoms (LUTS)     No Comments Provided     Essential (primary) hypertension     No Comments Provided     Osteoarthritis     No Comments Provided     Personal history of other malignant neoplasm of skin (CODE)     2010     Zoster without complications     2009       Past Surgical History:   Procedure Laterality Date     ARTHROSCOPY SHOULDER Right 2013     COLONOSCOPY      2006,Repeat in 2011     COLONOSCOPY      2011,F/U 2021, if appropriate     EXTRACAPSULAR CATARACT EXTRATION WITH INTRAOCULAR LENS IMPLANT Bilateral      FRACTURE SURGERY      1977,Compression fracture of  right tibia. Skiing accident, surgical repair     HERNIA REPAIR, INGUINAL RT/LT Left     with mesh     HERNIA REPAIR, INGUINAL RT/LT Right 2014     SIGMOIDOSCOPY FLEXIBLE      ,Normal       Allergies   Allergen Reactions     Ace Inhibitors Cough     Iodine Hives       Current Outpatient Medications   Medication Sig Dispense Refill     ASPIRIN 81 PO Take 81 mg by mouth daily       diltiazem ER COATED BEADS (CARDIAZEM LA) 180 MG 24 hr tablet Take 1 tablet (180 mg) by mouth daily 90 tablet 3     finasteride (PROSCAR) 5 MG tablet Take 1 tablet (5 mg) by mouth daily 90 tablet 3     Glucosamine Sulfate 500 MG TABS Take 1,000 mg by mouth daily       hydrochlorothiazide (HYDRODIURIL) 25 MG tablet Take 1 tablet (25 mg) by mouth daily 90 tablet 3     ibuprofen (ADVIL/MOTRIN) 200 MG tablet Take 200 mg by mouth daily       loratadine (CLARITIN) 10 MG tablet Take 10 mg by mouth daily as needed       Multiple Vitamin (MULTI-VITAMINS) TABS Take 1 tablet by mouth daily       tamsulosin (FLOMAX) 0.4 MG capsule Take 1 capsule (0.4 mg) by mouth daily 90 capsule 3       Social History     Socioeconomic History     Marital status:      Spouse name: Not on file     Number of children: Not on file     Years of education: Not on file     Highest education level: Not on file   Occupational History     Not on file   Social Needs     Financial resource strain: Not on file     Food insecurity     Worry: Not on file     Inability: Not on file     Transportation needs     Medical: Not on file     Non-medical: Not on file   Tobacco Use     Smoking status: Former Smoker     Types: Cigarettes     Last attempt to quit: 1954     Years since quittin.2     Smokeless tobacco: Never Used   Substance and Sexual Activity     Alcohol use: Yes     Alcohol/week: 4.2 standard drinks     Frequency: 2-3 times a week     Drinks per session: 1 or 2     Comment: Occasional     Drug use: No     Comment: Drug use: No     Sexual activity: Not  on file   Lifestyle     Physical activity     Days per week: Not on file     Minutes per session: Not on file     Stress: Not on file   Relationships     Social connections     Talks on phone: Not on file     Gets together: Not on file     Attends Muslim service: Not on file     Active member of club or organization: Not on file     Attends meetings of clubs or organizations: Not on file     Relationship status: Not on file     Intimate partner violence     Fear of current or ex partner: Not on file     Emotionally abused: Not on file     Physically abused: Not on file     Forced sexual activity: Not on file   Other Topics Concern     Parent/sibling w/ CABG, MI or angioplasty before 65F 55M? Not Asked   Social History Narrative    , four children, retired from the [employer].  Lives in town.       Review of Systems   Constitutional: Negative.    Endocrine: Negative.    Skin: Negative.    Allergic/Immunologic: Negative.    Hematological: Negative.        Physical Exam  Vitals signs and nursing note reviewed.   Constitutional:       General: He is not in acute distress.     Appearance: Normal appearance. He is not ill-appearing, toxic-appearing or diaphoretic.   HENT:      Nose:      Right Sinus: No maxillary sinus tenderness or frontal sinus tenderness.      Left Sinus: Maxillary sinus tenderness present. No frontal sinus tenderness.      Mouth/Throat:      Pharynx: Oropharynx is clear.      Tonsils: No tonsillar exudate.   Neck:      Musculoskeletal: Normal range of motion and neck supple. No neck rigidity.   Musculoskeletal:      Comments: Mild osteoarthritic changes of the knees but with full range of motion and no effusion.  No ligamentous laxity.   Lymphadenopathy:      Cervical: No cervical adenopathy.   Neurological:      Mental Status: He is alert.         Assessment:      ICD-10-CM    1. Chronic pain of left knee  M25.562 XR Knee Left 3 Views    G89.29    2. Acute non-recurrent  maxillary sinusitis  J01.00        Plan: For his sinusitis, I put him on amoxicillin twice daily for 10 days.  Notify if not improving.  We reviewed his knee x-ray, he is not interested in any sort of knee surgery so he will just continue with symptomatic measures.  If this is something that worsens or persists we could always get him in for a knee injection trial.  In the meantime, he will just continue with Tylenol, etc.

## 2020-07-27 NOTE — NURSING NOTE
Jeffrey Cespedes is a 88 year old male presenting today for: sinus drainage for 1 month, arthritis knees  Medication Reconciliation: complete    Sera Bejarano LPN 7/27/2020 3:14 PM

## 2020-12-10 DIAGNOSIS — I10 ESSENTIAL HYPERTENSION: ICD-10-CM

## 2020-12-10 DIAGNOSIS — R39.9 LOWER URINARY TRACT SYMPTOMS (LUTS): ICD-10-CM

## 2020-12-10 RX ORDER — HYDROCHLOROTHIAZIDE 25 MG/1
TABLET ORAL
Qty: 90 TABLET | Refills: 3 | Status: SHIPPED | OUTPATIENT
Start: 2020-12-10 | End: 2021-12-02

## 2020-12-10 RX ORDER — TAMSULOSIN HYDROCHLORIDE 0.4 MG/1
CAPSULE ORAL
Qty: 90 CAPSULE | Refills: 3 | Status: SHIPPED | OUTPATIENT
Start: 2020-12-10 | End: 2021-12-02

## 2020-12-10 NOTE — TELEPHONE ENCOUNTER
Trev[facility] sent Rx request for the following:      HYDROCHLOROTHIAZIDE 25MG TABLETS  Sig: TAKE 1 TABLET(25 MG) BY MOUTH DAILY      Last Prescription Date:   12/12/2019  Last Fill Qty/Refills:         90, R-3    Last Office Visit:              7/27/2020   Future Office visit:           12/14/2020       TAMSULOSIN 0.4MG CAPSULES  Sig: TAKE 1 CAPSULE(0.4 MG) BY MOUTH DAILY      Last Prescription Date:   12/12/2019  Last Fill Qty/Refills:         90, R-3        Prescription refilled per RN Medication Refill Policy.................... Seymour Hernandez RN ....................  12/10/2020   10:06 AM

## 2020-12-14 ENCOUNTER — OFFICE VISIT (OUTPATIENT)
Dept: INTERNAL MEDICINE | Facility: OTHER | Age: 85
End: 2020-12-14
Attending: INTERNAL MEDICINE
Payer: COMMERCIAL

## 2020-12-14 VITALS
RESPIRATION RATE: 16 BRPM | TEMPERATURE: 97.7 F | DIASTOLIC BLOOD PRESSURE: 78 MMHG | WEIGHT: 164 LBS | SYSTOLIC BLOOD PRESSURE: 116 MMHG | HEIGHT: 67 IN | HEART RATE: 104 BPM | BODY MASS INDEX: 25.74 KG/M2

## 2020-12-14 DIAGNOSIS — J32.0 CHRONIC MAXILLARY SINUSITIS: ICD-10-CM

## 2020-12-14 DIAGNOSIS — R73.9 HYPERGLYCEMIA: ICD-10-CM

## 2020-12-14 DIAGNOSIS — R39.9 LOWER URINARY TRACT SYMPTOMS (LUTS): ICD-10-CM

## 2020-12-14 DIAGNOSIS — I10 ESSENTIAL HYPERTENSION: Primary | ICD-10-CM

## 2020-12-14 DIAGNOSIS — I47.10 SVT (SUPRAVENTRICULAR TACHYCARDIA) (H): ICD-10-CM

## 2020-12-14 DIAGNOSIS — M19.91 PRIMARY OSTEOARTHRITIS, UNSPECIFIED SITE: ICD-10-CM

## 2020-12-14 DIAGNOSIS — I47.10 PAROXYSMAL SUPRAVENTRICULAR TACHYCARDIA (H): ICD-10-CM

## 2020-12-14 DIAGNOSIS — E78.2 MIXED HYPERLIPIDEMIA: ICD-10-CM

## 2020-12-14 LAB
ALBUMIN SERPL-MCNC: 4.3 G/DL (ref 3.5–5.7)
ALP SERPL-CCNC: 69 U/L (ref 34–104)
ALT SERPL W P-5'-P-CCNC: 13 U/L (ref 7–52)
ANION GAP SERPL CALCULATED.3IONS-SCNC: 8 MMOL/L (ref 3–14)
AST SERPL W P-5'-P-CCNC: 14 U/L (ref 13–39)
BILIRUB SERPL-MCNC: 1.5 MG/DL (ref 0.3–1)
BUN SERPL-MCNC: 24 MG/DL (ref 7–25)
CALCIUM SERPL-MCNC: 9.2 MG/DL (ref 8.6–10.3)
CHLORIDE SERPL-SCNC: 103 MMOL/L (ref 98–107)
CHOLEST SERPL-MCNC: 111 MG/DL
CO2 SERPL-SCNC: 31 MMOL/L (ref 21–31)
CREAT SERPL-MCNC: 0.86 MG/DL (ref 0.7–1.3)
GFR SERPL CREATININE-BSD FRML MDRD: 84 ML/MIN/{1.73_M2}
GLUCOSE SERPL-MCNC: 151 MG/DL (ref 70–105)
HBA1C MFR BLD: 5.3 % (ref 4–6)
HDLC SERPL-MCNC: 48 MG/DL (ref 23–92)
LDLC SERPL CALC-MCNC: 53 MG/DL
NONHDLC SERPL-MCNC: 63 MG/DL
POTASSIUM SERPL-SCNC: 4 MMOL/L (ref 3.5–5.1)
PROT SERPL-MCNC: 6.7 G/DL (ref 6.4–8.9)
SODIUM SERPL-SCNC: 142 MMOL/L (ref 134–144)
TRIGL SERPL-MCNC: 50 MG/DL

## 2020-12-14 PROCEDURE — 80061 LIPID PANEL: CPT | Mod: ZL | Performed by: INTERNAL MEDICINE

## 2020-12-14 PROCEDURE — 99215 OFFICE O/P EST HI 40 MIN: CPT | Performed by: INTERNAL MEDICINE

## 2020-12-14 PROCEDURE — G0463 HOSPITAL OUTPT CLINIC VISIT: HCPCS

## 2020-12-14 PROCEDURE — 36415 COLL VENOUS BLD VENIPUNCTURE: CPT | Mod: ZL | Performed by: INTERNAL MEDICINE

## 2020-12-14 PROCEDURE — 83036 HEMOGLOBIN GLYCOSYLATED A1C: CPT | Mod: ZL | Performed by: INTERNAL MEDICINE

## 2020-12-14 PROCEDURE — 80053 COMPREHEN METABOLIC PANEL: CPT | Mod: ZL | Performed by: INTERNAL MEDICINE

## 2020-12-14 RX ORDER — FINASTERIDE 5 MG/1
5 TABLET, FILM COATED ORAL DAILY
Qty: 90 TABLET | Refills: 3 | Status: SHIPPED | OUTPATIENT
Start: 2020-12-14 | End: 2021-12-02

## 2020-12-14 ASSESSMENT — ENCOUNTER SYMPTOMS
VOICE CHANGE: 0
CHEST TIGHTNESS: 0
COUGH: 0
HALLUCINATIONS: 0
FEVER: 0
DIARRHEA: 0
SHORTNESS OF BREATH: 0
TREMORS: 0
JOINT SWELLING: 0
VOMITING: 0
WOUND: 0
PALPITATIONS: 0
FREQUENCY: 0
RHINORRHEA: 0
NECK PAIN: 0
CHILLS: 0
ABDOMINAL PAIN: 0
DYSURIA: 0
CONFUSION: 0
BRUISES/BLEEDS EASILY: 0
ABDOMINAL DISTENTION: 0
DIFFICULTY URINATING: 0
BACK PAIN: 0
TROUBLE SWALLOWING: 0
APPETITE CHANGE: 0
FLANK PAIN: 0
SEIZURES: 0
HEADACHES: 0
AGITATION: 0
NECK STIFFNESS: 0
DIZZINESS: 0
ADENOPATHY: 0
UNEXPECTED WEIGHT CHANGE: 0
EYE PAIN: 0
SLEEP DISTURBANCE: 0
EYE REDNESS: 0
WEAKNESS: 0
SPEECH DIFFICULTY: 0
WHEEZING: 0
SINUS PRESSURE: 0
NAUSEA: 0
LIGHT-HEADEDNESS: 0
CONSTIPATION: 0
FATIGUE: 0
SINUS PAIN: 0
NUMBNESS: 0
NERVOUS/ANXIOUS: 0
SORE THROAT: 0
HEMATURIA: 0
DIAPHORESIS: 0
BLOOD IN STOOL: 0

## 2020-12-14 ASSESSMENT — MIFFLIN-ST. JEOR: SCORE: 1372.53

## 2020-12-14 ASSESSMENT — PAIN SCALES - GENERAL: PAINLEVEL: NO PAIN (0)

## 2020-12-14 NOTE — LETTER
December 14, 2020      Jeffrey HANEY Coy  [address] [facility] [address]        Dear Jeffrey,    Below are the results of your recent labs:    Results for orders placed or performed in visit on 12/14/20   Hemoglobin A1c     Status: None   Result Value Ref Range    Hemoglobin A1C 5.3 4.0 - 6.0 %   Lipid Profile     Status: None   Result Value Ref Range    Cholesterol 111 <200 mg/dL    Triglycerides 50 <150 mg/dL    HDL Cholesterol 48 23 - 92 mg/dL    LDL Cholesterol Calculated 53 <100 mg/dL    Non HDL Cholesterol 63 <130 mg/dL   Comprehensive metabolic panel     Status: Abnormal   Result Value Ref Range    Sodium 142 134 - 144 mmol/L    Potassium 4.0 3.5 - 5.1 mmol/L    Chloride 103 98 - 107 mmol/L    Carbon Dioxide 31 21 - 31 mmol/L    Anion Gap 8 3 - 14 mmol/L    Glucose 151 (H) 70 - 105 mg/dL    Urea Nitrogen 24 7 - 25 mg/dL    Creatinine 0.86 0.70 - 1.30 mg/dL    GFR Estimate 84 >60 mL/min/[1.73_m2]    GFR Estimate If Black >90 >60 mL/min/[1.73_m2]    Calcium 9.2 8.6 - 10.3 mg/dL    Bilirubin Total 1.5 (H) 0.3 - 1.0 mg/dL    Albumin 4.3 3.5 - 5.7 g/dL    Protein Total 6.7 6.4 - 8.9 g/dL    Alkaline Phosphatase 69 34 - 104 U/L    ALT 13 7 - 52 U/L    AST 14 13 - 39 U/L        Your blood tests look fine.  Once again your sugar is elevated but your diabetes test is normal.  I am satisfied with your results.    Sincerely,        Cody Souza MD  Internal Medicine  [facility]

## 2020-12-14 NOTE — PROGRESS NOTES
Chief Complaint:  This patient is here for a comprehensive review of their multiple medical problems, renewal of medications and update on necessary health maintenance issues.      HPI: This patient is here today for complete evaluation and review of his chronic medical problems.  He has a history of supraventricular tachycardia.  For the present time, he is treated with diltiazem only.  He has seen cardiology for this and no further treatment is recommended.  He has not had any issues with the SVT that he is aware of.    He has a history of hyperglycemia.  He is due for recheck on that.  He has a history of hypertension which is adequately treated.  He has a history of lower urinary tract symptoms for which he takes both Flomax and finasteride.  He has relatively good control of the symptoms with these medications.    Other than that he really feels quite well with no major complaints or problems.  He does have some chronic sinus issues and does use a nasal steroid intermittently and takes Claritin on a regular basis.    Medications are reconciled.  Past medical history, past surgical history, family history and social histories are reviewed and updated.  Immunizations are reviewed, he is due for Shingrix vaccine and that was talked about today.  Due to age he is no longer needing screening colonoscopy.    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     Calculus of kidney     1977, 1989,Reoccured     Enlarged prostate with lower urinary tract symptoms (LUTS)     No Comments Provided     Essential (primary) hypertension     No Comments Provided     Osteoarthritis     No Comments Provided     Personal history of other malignant neoplasm of skin (CODE)     2010     SVT (supraventricular tachycardia) (H)      Zoster without complications     2009       Past Surgical History:   Procedure Laterality Date      ARTHROSCOPY SHOULDER Right 2013     COLONOSCOPY      2006,Repeat in 2011     COLONOSCOPY      2011,F/U 2021, if appropriate     EXTRACAPSULAR CATARACT EXTRATION WITH INTRAOCULAR LENS IMPLANT Bilateral      FRACTURE SURGERY      1977,Compression fracture of right tibia. Skiing accident, surgical repair     HERNIA REPAIR, INGUINAL RT/LT Left 2013    with mesh     HERNIA REPAIR, INGUINAL RT/LT Right 2014     SIGMOIDOSCOPY FLEXIBLE      1998,Normal       Current Outpatient Medications   Medication Sig Dispense Refill     ASPIRIN 81 PO Take 81 mg by mouth daily       diltiazem ER COATED BEADS (CARDIAZEM LA) 180 MG 24 hr tablet Take 1 tablet (180 mg) by mouth daily 90 tablet 3     finasteride (PROSCAR) 5 MG tablet Take 1 tablet (5 mg) by mouth daily 90 tablet 3     Glucosamine Sulfate 500 MG TABS Take 1,000 mg by mouth daily       hydrochlorothiazide (HYDRODIURIL) 25 MG tablet TAKE 1 TABLET(25 MG) BY MOUTH DAILY 90 tablet 3     ibuprofen (ADVIL/MOTRIN) 200 MG tablet Take 200 mg by mouth daily       loratadine (CLARITIN) 10 MG tablet Take 10 mg by mouth daily as needed       Multiple Vitamin (MULTI-VITAMINS) TABS Take 1 tablet by mouth daily       tamsulosin (FLOMAX) 0.4 MG capsule TAKE 1 CAPSULE(0.4 MG) BY MOUTH DAILY 90 capsule 3       Allergies   Allergen Reactions     Ace Inhibitors Cough     Iodine Hives       Family History   Problem Relation Age of Onset     Other - See Comments Father         Stroke, 94     Other - See Comments Mother         Old age     HIV/AIDS Brother         HIV     Other - See Comments Brother         While getting pacemaker     Cancer Sister         Cancer,In bones--left femur       Social History     Socioeconomic History     Marital status:      Spouse name: Not on file     Number of children: Not on file     Years of education: Not on file     Highest education level: Not on file   Occupational History     Not on file   Social Needs     Financial resource strain: Not on file      Food insecurity     Worry: Not on file     Inability: Not on file     Transportation needs     Medical: Not on file     Non-medical: Not on file   Tobacco Use     Smoking status: Former Smoker     Types: Cigarettes     Quit date: 1954     Years since quittin.5     Smokeless tobacco: Never Used   Substance and Sexual Activity     Alcohol use: Yes     Alcohol/week: 4.2 standard drinks     Frequency: 2-3 times a week     Drinks per session: 1 or 2     Comment: Occasional     Drug use: No     Comment: Drug use: No     Sexual activity: Not on file   Lifestyle     Physical activity     Days per week: Not on file     Minutes per session: Not on file     Stress: Not on file   Relationships     Social connections     Talks on phone: Not on file     Gets together: Not on file     Attends Taoist service: Not on file     Active member of club or organization: Not on file     Attends meetings of clubs or organizations: Not on file     Relationship status: Not on file     Intimate partner violence     Fear of current or ex partner: Not on file     Emotionally abused: Not on file     Physically abused: Not on file     Forced sexual activity: Not on file   Other Topics Concern     Parent/sibling w/ CABG, MI or angioplasty before 65F 55M? Not Asked   Social History Narrative    , four children, retired from the [employer].  Lives in town.       Review of Systems   Constitutional: Negative for appetite change, chills, diaphoresis, fatigue, fever and unexpected weight change.   HENT: Negative for ear pain, rhinorrhea, sinus pressure, sinus pain, sore throat, trouble swallowing and voice change.    Eyes: Negative for pain, redness and visual disturbance.   Respiratory: Negative for cough, chest tightness, shortness of breath and wheezing.    Cardiovascular: Negative for chest pain, palpitations and leg swelling.   Gastrointestinal: Negative for abdominal distention, abdominal pain, blood in stool,  constipation, diarrhea, nausea and vomiting.   Endocrine: Negative for cold intolerance and heat intolerance.   Genitourinary: Negative for difficulty urinating, dysuria, flank pain, frequency and hematuria.   Musculoskeletal: Negative for back pain, joint swelling, neck pain and neck stiffness.   Skin: Negative for rash and wound.   Allergic/Immunologic: Negative for immunocompromised state.   Neurological: Negative for dizziness, tremors, seizures, syncope, speech difficulty, weakness, light-headedness, numbness and headaches.   Hematological: Negative for adenopathy. Does not bruise/bleed easily.   Psychiatric/Behavioral: Negative for agitation, behavioral problems, confusion, hallucinations and sleep disturbance. The patient is not nervous/anxious.        Physical Exam  Vitals signs and nursing note reviewed.   Constitutional:       General: He is not in acute distress.     Appearance: He is well-developed. He is not diaphoretic.   HENT:      Head: Normocephalic.      Right Ear: Tympanic membrane, ear canal and external ear normal.      Left Ear: Tympanic membrane, ear canal and external ear normal.      Nose: Nose normal.      Mouth/Throat:      Pharynx: No oropharyngeal exudate.   Eyes:      Conjunctiva/sclera: Conjunctivae normal.      Pupils: Pupils are equal, round, and reactive to light.   Neck:      Musculoskeletal: Normal range of motion and neck supple.      Thyroid: No thyroid mass or thyromegaly.      Vascular: Normal carotid pulses. No carotid bruit or JVD.      Trachea: No tracheal deviation.   Cardiovascular:      Rate and Rhythm: Normal rate and regular rhythm.      Heart sounds: Murmur present. Systolic murmur present with a grade of 2/6. No friction rub. No gallop.    Pulmonary:      Effort: Pulmonary effort is normal. No respiratory distress.      Breath sounds: Normal breath sounds. No stridor. No decreased breath sounds, wheezing, rhonchi or rales.   Abdominal:      General: Bowel sounds are  normal. There is no distension.      Palpations: Abdomen is soft. There is no mass.      Tenderness: There is no abdominal tenderness. There is no guarding or rebound.      Hernia: No hernia is present.   Genitourinary:     Penis: Normal.       Testes: Normal.      Prostate: Normal.      Rectum: Normal.   Musculoskeletal: Normal range of motion.      Right lower leg: No edema.      Left lower leg: No edema.   Lymphadenopathy:      Cervical: No cervical adenopathy.   Skin:     General: Skin is warm and dry.      Findings: No rash.      Comments: Numerous benign-appearing skin lesions   Neurological:      Mental Status: He is alert and oriented to person, place, and time.      Cranial Nerves: No cranial nerve deficit.      Sensory: No sensory deficit.      Motor: No abnormal muscle tone.      Coordination: Coordination normal.      Deep Tendon Reflexes: Reflexes normal.         Assessment:      ICD-10-CM    1. Essential hypertension  I10 Comprehensive metabolic panel   2. Lower urinary tract symptoms (LUTS)  R39.9    3. Hyperglycemia  R73.9 Hemoglobin A1c   4. Primary osteoarthritis, unspecified site  M19.91    5. Chronic maxillary sinusitis  J32.0    6. SVT (supraventricular tachycardia) (H)  I47.1    7. Mixed hyperlipidemia  E78.2 Lipid Profile        Plan: He appears to be doing very well at this time.  His exam is unremarkable and stable.  Medications will continue without change.  Complete lab drawn and pending, I will send him a letter with the results and any recommendation for change, etc.  Assuming all goes well, he will follow-up on an annual basis.

## 2020-12-14 NOTE — NURSING NOTE
"Chief Complaint   Patient presents with     Physical       Initial /78 (BP Location: Right arm, Patient Position: Sitting, Cuff Size: Adult Regular)   Pulse 104   Temp 97.7  F (36.5  C) (Tympanic)   Resp 16   Ht 1.702 m (5' 7\")   Wt 74.4 kg (164 lb)   BMI 25.69 kg/m   Estimated body mass index is 25.69 kg/m  as calculated from the following:    Height as of this encounter: 1.702 m (5' 7\").    Weight as of this encounter: 74.4 kg (164 lb).  Medication Reconciliation: complete    Yolette Ng LPN  "

## 2021-02-26 ENCOUNTER — IMMUNIZATION (OUTPATIENT)
Dept: FAMILY MEDICINE | Facility: OTHER | Age: 86
End: 2021-02-26
Attending: INTERNAL MEDICINE
Payer: MEDICARE

## 2021-02-26 PROCEDURE — 91300 PR COVID VAC PFIZER DIL RECON 30 MCG/0.3 ML IM: CPT

## 2021-03-19 ENCOUNTER — IMMUNIZATION (OUTPATIENT)
Dept: FAMILY MEDICINE | Facility: OTHER | Age: 86
End: 2021-03-19
Attending: FAMILY MEDICINE
Payer: MEDICARE

## 2021-03-19 PROCEDURE — 91300 PR COVID VAC PFIZER DIL RECON 30 MCG/0.3 ML IM: CPT

## 2021-04-13 ENCOUNTER — OFFICE VISIT (OUTPATIENT)
Dept: INTERNAL MEDICINE | Facility: OTHER | Age: 86
End: 2021-04-13
Attending: INTERNAL MEDICINE
Payer: MEDICARE

## 2021-04-13 VITALS
HEART RATE: 88 BPM | BODY MASS INDEX: 26.81 KG/M2 | SYSTOLIC BLOOD PRESSURE: 118 MMHG | DIASTOLIC BLOOD PRESSURE: 70 MMHG | WEIGHT: 171.2 LBS | TEMPERATURE: 97.6 F | OXYGEN SATURATION: 98 % | RESPIRATION RATE: 16 BRPM

## 2021-04-13 DIAGNOSIS — M17.12 PRIMARY OSTEOARTHRITIS OF LEFT KNEE: Primary | ICD-10-CM

## 2021-04-13 PROCEDURE — 250N000011 HC RX IP 250 OP 636: Performed by: INTERNAL MEDICINE

## 2021-04-13 PROCEDURE — G0463 HOSPITAL OUTPT CLINIC VISIT: HCPCS | Mod: 25 | Performed by: INTERNAL MEDICINE

## 2021-04-13 PROCEDURE — 20610 DRAIN/INJ JOINT/BURSA W/O US: CPT | Performed by: INTERNAL MEDICINE

## 2021-04-13 PROCEDURE — 99213 OFFICE O/P EST LOW 20 MIN: CPT | Mod: 25 | Performed by: INTERNAL MEDICINE

## 2021-04-13 RX ORDER — TRIAMCINOLONE ACETONIDE 40 MG/ML
40 INJECTION, SUSPENSION INTRA-ARTICULAR; INTRAMUSCULAR ONCE
Status: COMPLETED | OUTPATIENT
Start: 2021-04-13 | End: 2021-04-13

## 2021-04-13 RX ADMIN — TRIAMCINOLONE ACETONIDE 40 MG: 40 INJECTION, SUSPENSION INTRA-ARTICULAR; INTRAMUSCULAR at 15:00

## 2021-04-13 ASSESSMENT — ENCOUNTER SYMPTOMS
ENDOCRINE NEGATIVE: 1
EYES NEGATIVE: 1
CONSTITUTIONAL NEGATIVE: 1
ALLERGIC/IMMUNOLOGIC NEGATIVE: 1

## 2021-04-13 ASSESSMENT — PAIN SCALES - GENERAL: PAINLEVEL: MODERATE PAIN (4)

## 2021-04-13 NOTE — PROGRESS NOTES
Chief Complaint:  Knee pain.    HPI: He is here today with a complaint of knee pain.  Last July we did an x-ray of his left knee and it showed significant osteoarthritis with medial joint space narrowing.  He has been basically limping along with it since that time.  He is interested in trying a cortisone injection.  It is quite uncomfortable and he does not want to continue as is.  We also did talk about the option of orthopedic referral for possible viscosupplementation therapy versus replacement.    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     Calculus of kidney     1977, 1989,Reoccured     Enlarged prostate with lower urinary tract symptoms (LUTS)     No Comments Provided     Essential (primary) hypertension     No Comments Provided     Osteoarthritis     No Comments Provided     Personal history of other malignant neoplasm of skin (CODE)     2010     SVT (supraventricular tachycardia) (H)      Zoster without complications     2009       Past Surgical History:   Procedure Laterality Date     ARTHROSCOPY SHOULDER Right 2013     COLONOSCOPY      2006,Repeat in 2011     COLONOSCOPY      2011,F/U 2021, if appropriate     EXTRACAPSULAR CATARACT EXTRATION WITH INTRAOCULAR LENS IMPLANT Bilateral      FRACTURE SURGERY      1977,Compression fracture of right tibia. Skiing accident, surgical repair     HERNIA REPAIR, INGUINAL RT/LT Left 2013    with mesh     HERNIA REPAIR, INGUINAL RT/LT Right 2014     SIGMOIDOSCOPY FLEXIBLE      1998,Normal       Allergies   Allergen Reactions     Ace Inhibitors Cough     Iodine Hives       Current Outpatient Medications   Medication Sig Dispense Refill     ASPIRIN 81 PO Take 81 mg by mouth daily       diltiazem ER COATED BEADS (CARDIAZEM LA) 180 MG 24 hr tablet Take 1 tablet (180 mg) by mouth daily 90 tablet 3     finasteride (PROSCAR) 5 MG tablet Take 1 tablet (5 mg) by mouth  daily 90 tablet 3     Glucosamine Sulfate 500 MG TABS Take 1,000 mg by mouth daily       hydrochlorothiazide (HYDRODIURIL) 25 MG tablet TAKE 1 TABLET(25 MG) BY MOUTH DAILY 90 tablet 3     ibuprofen (ADVIL/MOTRIN) 200 MG tablet Take 200 mg by mouth daily       loratadine (CLARITIN) 10 MG tablet Take 10 mg by mouth daily as needed       Multiple Vitamin (MULTI-VITAMINS) TABS Take 1 tablet by mouth daily       tamsulosin (FLOMAX) 0.4 MG capsule TAKE 1 CAPSULE(0.4 MG) BY MOUTH DAILY 90 capsule 3       Review of Systems   Constitutional: Negative.    Eyes: Negative.    Endocrine: Negative.    Allergic/Immunologic: Negative.        Physical Exam  Vitals signs and nursing note reviewed.   Constitutional:       General: He is not in acute distress.     Appearance: Normal appearance. He is not ill-appearing, toxic-appearing or diaphoretic.   Musculoskeletal:      Comments: Left knee with osteoarthritis changes.  No erythema, induration or effusion.   Neurological:      Mental Status: He is alert.         Assessment:        ICD-10-CM    1. Primary osteoarthritis of left knee  M17.12 triamcinolone (KENALOG-40) injection 40 mg     DRAIN/INJECT LARGE JOINT/BURSA       Plan: The patient has osteoarthritis of his left knee and is requesting a trial of an injection.  After informed consent and a timeout, the left knee was prepped sterilely and was injected from the anteromedial approach with 3 cc of Marcaine and 1 cc of Kenalog 40 without difficulty.  Dressing was placed.  He will follow-up on this as needed.

## 2021-04-13 NOTE — NURSING NOTE
"Chief Complaint   Patient presents with     Musculoskeletal Problem     Both knees, left is worse       Initial /70 (BP Location: Right arm, Patient Position: Sitting, Cuff Size: Adult Regular)   Pulse 88   Temp 97.6  F (36.4  C) (Tympanic)   Resp 16   Wt 77.7 kg (171 lb 3.2 oz)   SpO2 98%   BMI 26.81 kg/m   Estimated body mass index is 26.81 kg/m  as calculated from the following:    Height as of 12/14/20: 1.702 m (5' 7\").    Weight as of this encounter: 77.7 kg (171 lb 3.2 oz).  Medication Reconciliation: complete    Brittany Carrasco LPN    "

## 2021-06-02 ENCOUNTER — OFFICE VISIT (OUTPATIENT)
Dept: ORTHOPEDICS | Facility: OTHER | Age: 86
End: 2021-06-02
Attending: ORTHOPAEDIC SURGERY
Payer: MEDICARE

## 2021-06-02 VITALS
BODY MASS INDEX: 25.06 KG/M2 | HEART RATE: 92 BPM | DIASTOLIC BLOOD PRESSURE: 62 MMHG | SYSTOLIC BLOOD PRESSURE: 104 MMHG | WEIGHT: 160 LBS

## 2021-06-02 DIAGNOSIS — M25.562 CHRONIC PAIN OF LEFT KNEE: Primary | ICD-10-CM

## 2021-06-02 DIAGNOSIS — G89.29 CHRONIC PAIN OF LEFT KNEE: Primary | ICD-10-CM

## 2021-06-02 PROCEDURE — G0463 HOSPITAL OUTPT CLINIC VISIT: HCPCS

## 2021-06-02 PROCEDURE — G0463 HOSPITAL OUTPT CLINIC VISIT: HCPCS | Mod: 25

## 2021-06-02 PROCEDURE — 250N000011 HC RX IP 250 OP 636: Performed by: ORTHOPAEDIC SURGERY

## 2021-06-02 PROCEDURE — 20610 DRAIN/INJ JOINT/BURSA W/O US: CPT | Mod: RT | Performed by: ORTHOPAEDIC SURGERY

## 2021-06-02 PROCEDURE — 250N000009 HC RX 250: Performed by: ORTHOPAEDIC SURGERY

## 2021-06-02 PROCEDURE — 99203 OFFICE O/P NEW LOW 30 MIN: CPT | Mod: 25 | Performed by: ORTHOPAEDIC SURGERY

## 2021-06-02 PROCEDURE — 20610 DRAIN/INJ JOINT/BURSA W/O US: CPT | Performed by: ORTHOPAEDIC SURGERY

## 2021-06-02 RX ORDER — TRIAMCINOLONE ACETONIDE 40 MG/ML
40 INJECTION, SUSPENSION INTRA-ARTICULAR; INTRAMUSCULAR ONCE
Status: COMPLETED | OUTPATIENT
Start: 2021-06-02 | End: 2021-06-02

## 2021-06-02 RX ORDER — LIDOCAINE HYDROCHLORIDE 10 MG/ML
4 INJECTION, SOLUTION EPIDURAL; INFILTRATION; INTRACAUDAL; PERINEURAL ONCE
Status: COMPLETED | OUTPATIENT
Start: 2021-06-02 | End: 2021-06-02

## 2021-06-02 RX ADMIN — LIDOCAINE HYDROCHLORIDE 4 ML: 10 INJECTION, SOLUTION EPIDURAL; INFILTRATION; INTRACAUDAL; PERINEURAL at 13:24

## 2021-06-02 RX ADMIN — TRIAMCINOLONE ACETONIDE 40 MG: 40 INJECTION, SUSPENSION INTRA-ARTICULAR; INTRAMUSCULAR at 13:25

## 2021-06-02 NOTE — PROGRESS NOTES
Visit Date: 06/02/2021    REASON FOR EVALUATION:  Left knee pain.    HISTORY OF PRESENT ILLNESS:  Jeffrey comes in with history of left knee pain that has been ongoing here for a bit of time here.  It ultimately seems like it is not getting better here with time.  Tried an injection in March, seemed like it was a little bit more hit or miss.  He is  still fairly active.  He is here to explore his options.  I did review his x-rays from July of last year, which did show evidence for significant arthrosis of the medial compartment as well as patellofemoral joint.  He does use periodic Tylenol as well as Advil, it sounds, for management at this time.  He is here to explore options at this time.  No falls or injuries in the interim.  Pain can vary, about 4 on a scale of 0-10.    MEDICATIONS:  Have been reviewed.    ALLERGIES:  NOTED TO ACE INHIBITORS, IODINE.    REVIEW OF SYSTEMS:  A 12-point otherwise negative with the exception as stated above.    PHYSICAL EXAMINATION:    VITAL SIGNS:  The patient is 160 pounds, blood pressure 104/62, pulse 92.    GENERAL:  He is alert and oriented x3, cooperative, pleasant with exam, in no acute distress.  Does ambulate with a satisfactory gait.  Affect is appropriate here as well.    EXTREMITIES:  Examination of the knee shows motion of -5 to 115.  Ligament exam stable and balanced.  Tenderness with medial joint line palpation.  No pain with Katy testing.  Kneecap tracking is acceptable.  Neurovascular examination intact.  No skin lesions otherwise seen at this point in time here as well.    X-rays reviewed, show severe arthrosis of the medial compartment as well as patellofemoral joint.    PROCEDURE PERFORMED:  Following informed consent and sterile preparation, the patient's right knee was injected with 4 mL of 1% lidocaine and 40 mg of Kenalog under sterile conditions.  The patient did tolerate the procedure.    IMPRESSION:  Right knee end-stage osteoarthrosis.    PLAN:   Injection as stated above.  If ultimately his symptoms continue to progress here with time, I would advise that we do move forward with joint reconstruction, likely in the August timeframe or thereabouts.  We have discussed this with him.  He will let us know the next steps that he would like to undertake for this at this point.    Chaparro Lozano MD        D: 2021   T: 2021   MT: [id number]    Name:     ALFRED ROSALESNilesh  MRN:      [id number]        Account:    [id number]   :      1932           Visit Date: 2021     Document: [id number]

## 2021-06-02 NOTE — PROGRESS NOTES
Patient is here for consult on his left knee pain.  Nelia Ghosh LPN .....................6/2/2021 12:51 PM

## 2021-07-06 ENCOUNTER — OFFICE VISIT (OUTPATIENT)
Dept: FAMILY MEDICINE | Facility: OTHER | Age: 86
End: 2021-07-06
Attending: NURSE PRACTITIONER
Payer: MEDICARE

## 2021-07-06 VITALS
DIASTOLIC BLOOD PRESSURE: 64 MMHG | RESPIRATION RATE: 18 BRPM | OXYGEN SATURATION: 98 % | HEIGHT: 67 IN | SYSTOLIC BLOOD PRESSURE: 124 MMHG | BODY MASS INDEX: 24.8 KG/M2 | WEIGHT: 158 LBS | TEMPERATURE: 97 F | HEART RATE: 81 BPM
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
## 2021-07-06 LAB
BASOPHILS # BLD AUTO: 0 10E9/L (ref 0–0.2)
BASOPHILS NFR BLD AUTO: 0.2 %
DIFFERENTIAL METHOD BLD: ABNORMAL
EOSINOPHIL # BLD AUTO: 0.1 10E9/L (ref 0–0.7)
EOSINOPHIL NFR BLD AUTO: 1.8 %
ERYTHROCYTE [DISTWIDTH] IN BLOOD BY AUTOMATED COUNT: 13.7 % (ref 10–15)
HCT VFR BLD AUTO: 35.9 % (ref 40–53)
HGB BLD-MCNC: 12.7 G/DL (ref 13.3–17.7)
IMM GRANULOCYTES # BLD: 0.1 10E9/L (ref 0–0.4)
IMM GRANULOCYTES NFR BLD: 0.8 %
LYMPHOCYTES # BLD AUTO: 1 10E9/L (ref 0.8–5.3)
LYMPHOCYTES NFR BLD AUTO: 15.5 %
MCH RBC QN AUTO: 34 PG (ref 26.5–33)
MCHC RBC AUTO-ENTMCNC: 35.4 G/DL (ref 31.5–36.5)
MCV RBC AUTO: 96 FL (ref 78–100)
MONOCYTES # BLD AUTO: 0.7 10E9/L (ref 0–1.3)
MONOCYTES NFR BLD AUTO: 10.9 %
NEUTROPHILS # BLD AUTO: 4.4 10E9/L (ref 1.6–8.3)
NEUTROPHILS NFR BLD AUTO: 70.8 %
PLATELET # BLD AUTO: 118 10E9/L (ref 150–450)
RBC # BLD AUTO: 3.73 10E12/L (ref 4.4–5.9)
WBC # BLD AUTO: 6.1 10E9/L (ref 4–11)

## 2021-07-06 PROCEDURE — 86618 LYME DISEASE ANTIBODY: CPT | Mod: ZL | Performed by: NURSE PRACTITIONER

## 2021-07-06 PROCEDURE — 85025 COMPLETE CBC W/AUTO DIFF WBC: CPT | Mod: ZL,GZ | Performed by: NURSE PRACTITIONER

## 2021-07-06 PROCEDURE — 87798 DETECT AGENT NOS DNA AMP: CPT | Mod: ZL | Performed by: NURSE PRACTITIONER

## 2021-07-06 PROCEDURE — G0463 HOSPITAL OUTPT CLINIC VISIT: HCPCS

## 2021-07-06 PROCEDURE — 36415 COLL VENOUS BLD VENIPUNCTURE: CPT | Mod: ZL | Performed by: NURSE PRACTITIONER

## 2021-07-06 PROCEDURE — 99213 OFFICE O/P EST LOW 20 MIN: CPT | Performed by: NURSE PRACTITIONER

## 2021-07-06 RX ORDER — MULTIVITAMIN WITH IRON
250 TABLET ORAL
COMMUNITY

## 2021-07-06 RX ORDER — DOXYCYCLINE 100 MG/1
100 CAPSULE ORAL 2 TIMES DAILY
Qty: 28 CAPSULE | Refills: 0 | Status: SHIPPED | OUTPATIENT
Start: 2021-07-06 | End: 2021-07-20

## 2021-07-06 ASSESSMENT — MIFFLIN-ST. JEOR: SCORE: 1340.31

## 2021-07-06 ASSESSMENT — PAIN SCALES - GENERAL: PAINLEVEL: NO PAIN (0)

## 2021-07-06 NOTE — NURSING NOTE
Patient presents to clinic after locating embedded tick to right leg, back of knee.  Area is irritated and red.  Medication Reconciliation: complete    Marivel Raphael LPN

## 2021-07-06 NOTE — PROGRESS NOTES
ASSESSMENT/PLAN:  1. Tick bite, initial encounter    - Lyme Disease Ab with reflex to WB Serum  - Ehrlichia Anaplasma Sp by PCR  - Babesia Species by PCR  - doxycycline hyclate (VIBRAMYCIN) 100 MG capsule; Take 1 capsule (100 mg) by mouth 2 times daily for 14 days  Dispense: 28 capsule; Refill: 0  - CBC and Differential    CBC showed WBC within normal limits and decrease in the patient's platelet count, 118.     Lyme, ehrlichia, anaplasma and babesia results are pending.     Discussed with the patient that although the tick that was attached was a wood tick there is still concern for a tick borne illness vs. Cellulitis due to the large area of erythema where the tick was attached.     The patient was prescribed doxycycline BID x 14 days.     Instructed the patient to keep the affected area clean with soap and water and apply triple antibiotic ointment, avoid applying alcohol.     Discussed warning signs/symptoms indicative of need to f/u    Follow up if symptoms persist or worsen or concerns      I explained my diagnostic considerations and recommendations to the patient, who voiced understanding and agreement with the treatment plan. All questions were answered. We discussed potential side effects of any prescribed or recommended therapies, as well as expectations for response to treatments.        HPI:    Jeffrey Cespedes is a 89 year old male  who presents to [facility] today for a tick bite. The tick was attached to the posterior aspect of his right knee. The patient removed the tick last Thursday/Friday. Unsure of when the tick had attached. He washed the affected area well with soap and water and used alcohol to apply over the bite daily. The patient brought the tick into the clinic today and it does appear to be a wood tick. The patient states that recently he began to notice a large area of erythema where the bite occurred. Patient reports that the area of erythema is itchy. Denies pain. The patient denies  flu like symptoms or a fever.     Past Medical History:   Diagnosis Date     Allergic sinusitis     No Comments Provided     Benign neoplasm     ,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     Calculus of kidney     , ,Reoccured     Enlarged prostate with lower urinary tract symptoms (LUTS)     No Comments Provided     Essential (primary) hypertension     No Comments Provided     Osteoarthritis     No Comments Provided     Personal history of other malignant neoplasm of skin (CODE)     2010     SVT (supraventricular tachycardia) (H)      Zoster without complications          Past Surgical History:   Procedure Laterality Date     ARTHROSCOPY SHOULDER Right      COLONOSCOPY      ,Repeat in      COLONOSCOPY      ,F/U , if appropriate     EXTRACAPSULAR CATARACT EXTRATION WITH INTRAOCULAR LENS IMPLANT Bilateral      FRACTURE SURGERY      ,Compression fracture of right tibia. Skiing accident, surgical repair     HERNIA REPAIR, INGUINAL RT/LT Left     with mesh     HERNIA REPAIR, INGUINAL RT/LT Right      SIGMOIDOSCOPY FLEXIBLE      ,Normal     Social History     Tobacco Use     Smoking status: Former Smoker     Types: Cigarettes     Quit date: 1954     Years since quittin.1     Smokeless tobacco: Never Used   Substance Use Topics     Alcohol use: Yes     Alcohol/week: 4.2 standard drinks     Frequency: 2-3 times a week     Drinks per session: 1 or 2     Comment: Occasional     Current Outpatient Medications   Medication Sig Dispense Refill     ASPIRIN 81 PO Take 81 mg by mouth daily       diltiazem ER COATED BEADS (CARDIAZEM LA) 180 MG 24 hr tablet Take 1 tablet (180 mg) by mouth daily 90 tablet 3     finasteride (PROSCAR) 5 MG tablet Take 1 tablet (5 mg) by mouth daily 90 tablet 3     Glucosamine Sulfate 500 MG TABS Take 1,000 mg by mouth daily       hydrochlorothiazide (HYDRODIURIL) 25 MG tablet TAKE 1 TABLET(25 MG) BY MOUTH DAILY 90 tablet 3     ibuprofen  "(ADVIL/MOTRIN) 200 MG tablet Take 200 mg by mouth daily       loratadine (CLARITIN) 10 MG tablet Take 10 mg by mouth daily as needed       magnesium 250 MG tablet Take 250 mg by mouth       Multiple Vitamin (MULTI-VITAMINS) TABS Take 1 tablet by mouth daily       tamsulosin (FLOMAX) 0.4 MG capsule TAKE 1 CAPSULE(0.4 MG) BY MOUTH DAILY 90 capsule 3     Allergies   Allergen Reactions     Ace Inhibitors Cough     Iodine Hives         Past medical history, past surgical history, current medications and allergies reviewed and accurate to the best of my knowledge.        ROS:  Refer to HPI    /64 (BP Location: Left arm, Patient Position: Sitting)   Pulse 81   Temp 97  F (36.1  C) (Tympanic)   Resp 18   Ht 1.702 m (5' 7\")   Wt 71.7 kg (158 lb)   SpO2 98%   BMI 24.75 kg/m      EXAM:  General Appearance: Well appearing male, appropriate appearance for age. No acute distress  Dermatological: Circular area of erythema to the posterior aspect of the patient's right knee that is measuring approximately 3 inches in diameter.   Psychological: normal affect, alert, oriented, and pleasant.             "

## 2021-07-07 LAB — B BURGDOR IGG+IGM SER QL: 0.05 (ref 0–0.89)

## 2021-07-10 LAB
A PHAGOCYTOPH DNA BLD QL NAA+PROBE: NOT DETECTED
B MICROTI DNA SPEC QL NAA+PROBE: NOT DETECTED
BABESIA DNA SPEC QL NAA+PROBE: NOT DETECTED
E CHAFFEENSIS DNA BLD QL NAA+PROBE: NOT DETECTED
E EWINGII DNA SPEC QL NAA+PROBE: NOT DETECTED
EHRLICHIA DNA SPEC QL NAA+PROBE: NOT DETECTED

## 2021-10-06 ENCOUNTER — IMMUNIZATION (OUTPATIENT)
Dept: FAMILY MEDICINE | Facility: OTHER | Age: 86
End: 2021-10-06
Attending: FAMILY MEDICINE
Payer: MEDICARE

## 2021-10-06 DIAGNOSIS — Z23 NEED FOR PROPHYLACTIC VACCINATION AND INOCULATION AGAINST INFLUENZA: Primary | ICD-10-CM

## 2021-10-06 PROCEDURE — G0008 ADMIN INFLUENZA VIRUS VAC: HCPCS

## 2021-10-06 PROCEDURE — 0003A PR COVID VAC PFIZER DIL RECON 30 MCG/0.3 ML IM: CPT

## 2021-10-06 PROCEDURE — 90662 IIV NO PRSV INCREASED AG IM: CPT

## 2021-12-02 DIAGNOSIS — I47.10 PAROXYSMAL SUPRAVENTRICULAR TACHYCARDIA (H): ICD-10-CM

## 2021-12-02 DIAGNOSIS — R39.9 LOWER URINARY TRACT SYMPTOMS (LUTS): ICD-10-CM

## 2021-12-02 DIAGNOSIS — I10 ESSENTIAL HYPERTENSION: ICD-10-CM

## 2021-12-02 RX ORDER — HYDROCHLOROTHIAZIDE 25 MG/1
TABLET ORAL
Qty: 90 TABLET | Refills: 3 | Status: SHIPPED | OUTPATIENT
Start: 2021-12-02 | End: 2022-01-05

## 2021-12-02 RX ORDER — TAMSULOSIN HYDROCHLORIDE 0.4 MG/1
CAPSULE ORAL
Qty: 90 CAPSULE | Refills: 3 | Status: SHIPPED | OUTPATIENT
Start: 2021-12-02 | End: 2022-01-05

## 2021-12-02 RX ORDER — FINASTERIDE 5 MG/1
TABLET, FILM COATED ORAL
Qty: 90 TABLET | Refills: 3 | Status: SHIPPED | OUTPATIENT
Start: 2021-12-02 | End: 2022-01-05

## 2021-12-31 ENCOUNTER — ALLIED HEALTH/NURSE VISIT (OUTPATIENT)
Dept: FAMILY MEDICINE | Facility: OTHER | Age: 86
End: 2021-12-31
Attending: FAMILY MEDICINE
Payer: MEDICARE

## 2021-12-31 DIAGNOSIS — Z20.822 EXPOSURE TO 2019 NOVEL CORONAVIRUS: ICD-10-CM

## 2021-12-31 DIAGNOSIS — Z20.822 COVID-19 RULED OUT: Primary | ICD-10-CM

## 2021-12-31 PROCEDURE — C9803 HOPD COVID-19 SPEC COLLECT: HCPCS

## 2021-12-31 PROCEDURE — U0003 INFECTIOUS AGENT DETECTION BY NUCLEIC ACID (DNA OR RNA); SEVERE ACUTE RESPIRATORY SYNDROME CORONAVIRUS 2 (SARS-COV-2) (CORONAVIRUS DISEASE [COVID-19]), AMPLIFIED PROBE TECHNIQUE, MAKING USE OF HIGH THROUGHPUT TECHNOLOGIES AS DESCRIBED BY CMS-2020-01-R: HCPCS | Mod: ZL

## 2021-12-31 NOTE — PROGRESS NOTES
Patient swabbed for COVID-19 testing.  exposed  Shruthi Bettencourt MA on 12/31/2021 at 11:28 AM

## 2022-01-05 ENCOUNTER — OFFICE VISIT (OUTPATIENT)
Dept: INTERNAL MEDICINE | Facility: OTHER | Age: 87
End: 2022-01-05
Attending: INTERNAL MEDICINE
Payer: COMMERCIAL

## 2022-01-05 VITALS
HEART RATE: 91 BPM | SYSTOLIC BLOOD PRESSURE: 134 MMHG | OXYGEN SATURATION: 98 % | HEIGHT: 67 IN | BODY MASS INDEX: 25.3 KG/M2 | DIASTOLIC BLOOD PRESSURE: 74 MMHG | RESPIRATION RATE: 16 BRPM | WEIGHT: 161.2 LBS | TEMPERATURE: 96.9 F

## 2022-01-05 DIAGNOSIS — I10 ESSENTIAL HYPERTENSION: ICD-10-CM

## 2022-01-05 DIAGNOSIS — R73.9 HYPERGLYCEMIA: ICD-10-CM

## 2022-01-05 DIAGNOSIS — R39.9 LOWER URINARY TRACT SYMPTOMS (LUTS): ICD-10-CM

## 2022-01-05 DIAGNOSIS — I10 PRIMARY HYPERTENSION: ICD-10-CM

## 2022-01-05 DIAGNOSIS — I47.10 PAROXYSMAL SUPRAVENTRICULAR TACHYCARDIA (H): ICD-10-CM

## 2022-01-05 DIAGNOSIS — M17.12 PRIMARY OSTEOARTHRITIS OF LEFT KNEE: ICD-10-CM

## 2022-01-05 DIAGNOSIS — I47.10 SVT (SUPRAVENTRICULAR TACHYCARDIA) (H): Primary | ICD-10-CM

## 2022-01-05 LAB
ALBUMIN SERPL-MCNC: 4.3 G/DL (ref 3.5–5.7)
ALP SERPL-CCNC: 75 U/L (ref 34–104)
ALT SERPL W P-5'-P-CCNC: 20 U/L (ref 7–52)
ANION GAP SERPL CALCULATED.3IONS-SCNC: 8 MMOL/L (ref 3–14)
AST SERPL W P-5'-P-CCNC: 15 U/L (ref 13–39)
BILIRUB SERPL-MCNC: 1.5 MG/DL (ref 0.3–1)
BUN SERPL-MCNC: 19 MG/DL (ref 7–25)
CALCIUM SERPL-MCNC: 9.3 MG/DL (ref 8.6–10.3)
CHLORIDE BLD-SCNC: 102 MMOL/L (ref 98–107)
CHOLEST SERPL-MCNC: 75 MG/DL
CO2 SERPL-SCNC: 29 MMOL/L (ref 21–31)
CREAT SERPL-MCNC: 0.89 MG/DL (ref 0.7–1.3)
ERYTHROCYTE [DISTWIDTH] IN BLOOD BY AUTOMATED COUNT: 13.8 % (ref 10–15)
FASTING STATUS PATIENT QL REPORTED: NO
GFR SERPL CREATININE-BSD FRML MDRD: 82 ML/MIN/1.73M2
GLUCOSE BLD-MCNC: 242 MG/DL (ref 70–105)
HBA1C MFR BLD: 6 % (ref 4–6.2)
HCT VFR BLD AUTO: 35.9 % (ref 40–53)
HDLC SERPL-MCNC: 43 MG/DL (ref 23–92)
HGB BLD-MCNC: 12.9 G/DL (ref 13.3–17.7)
LDLC SERPL CALC-MCNC: 20 MG/DL
MCH RBC QN AUTO: 33.7 PG (ref 26.5–33)
MCHC RBC AUTO-ENTMCNC: 35.9 G/DL (ref 31.5–36.5)
MCV RBC AUTO: 94 FL (ref 78–100)
NONHDLC SERPL-MCNC: 32 MG/DL
PLATELET # BLD AUTO: 115 10E3/UL (ref 150–450)
POTASSIUM BLD-SCNC: 3.7 MMOL/L (ref 3.5–5.1)
PROT SERPL-MCNC: 6.6 G/DL (ref 6.4–8.9)
RBC # BLD AUTO: 3.83 10E6/UL (ref 4.4–5.9)
SODIUM SERPL-SCNC: 139 MMOL/L (ref 134–144)
TRIGL SERPL-MCNC: 58 MG/DL
WBC # BLD AUTO: 5.5 10E3/UL (ref 4–11)

## 2022-01-05 PROCEDURE — 80061 LIPID PANEL: CPT | Mod: ZL | Performed by: INTERNAL MEDICINE

## 2022-01-05 PROCEDURE — 82040 ASSAY OF SERUM ALBUMIN: CPT | Mod: ZL | Performed by: INTERNAL MEDICINE

## 2022-01-05 PROCEDURE — 83036 HEMOGLOBIN GLYCOSYLATED A1C: CPT | Mod: ZL | Performed by: INTERNAL MEDICINE

## 2022-01-05 PROCEDURE — 36415 COLL VENOUS BLD VENIPUNCTURE: CPT | Mod: ZL | Performed by: INTERNAL MEDICINE

## 2022-01-05 PROCEDURE — 80053 COMPREHEN METABOLIC PANEL: CPT | Mod: ZL | Performed by: INTERNAL MEDICINE

## 2022-01-05 PROCEDURE — G0439 PPPS, SUBSEQ VISIT: HCPCS | Performed by: INTERNAL MEDICINE

## 2022-01-05 PROCEDURE — 85027 COMPLETE CBC AUTOMATED: CPT | Mod: ZL | Performed by: INTERNAL MEDICINE

## 2022-01-05 RX ORDER — FINASTERIDE 5 MG/1
5 TABLET, FILM COATED ORAL DAILY
Qty: 90 TABLET | Refills: 3 | Status: SHIPPED | OUTPATIENT
Start: 2022-01-05 | End: 2023-01-11

## 2022-01-05 RX ORDER — TAMSULOSIN HYDROCHLORIDE 0.4 MG/1
0.4 CAPSULE ORAL DAILY
Qty: 90 CAPSULE | Refills: 3 | Status: SHIPPED | OUTPATIENT
Start: 2022-01-05 | End: 2023-01-11

## 2022-01-05 RX ORDER — HYDROCHLOROTHIAZIDE 25 MG/1
25 TABLET ORAL DAILY
Qty: 90 TABLET | Refills: 3 | Status: SHIPPED | OUTPATIENT
Start: 2022-01-05 | End: 2023-01-11

## 2022-01-05 ASSESSMENT — MIFFLIN-ST. JEOR: SCORE: 1358.79

## 2022-01-05 ASSESSMENT — PAIN SCALES - GENERAL: PAINLEVEL: MILD PAIN (3)

## 2022-01-05 ASSESSMENT — ACTIVITIES OF DAILY LIVING (ADL): CURRENT_FUNCTION: NO ASSISTANCE NEEDED

## 2022-01-05 NOTE — PATIENT INSTRUCTIONS
Continue your current medications without change.    Blood tests today, I will send you a letter with the results.    If all goes well, follow-up annually.

## 2022-01-05 NOTE — LETTER
January 5, 2022      Jeffrey Cespedes  [address] [facility] [address]        Dear Jeffrey,    Below are the results of your recent labs:    Results for orders placed or performed in visit on 01/05/22   Comprehensive metabolic panel     Status: Abnormal   Result Value Ref Range    Sodium 139 134 - 144 mmol/L    Potassium 3.7 3.5 - 5.1 mmol/L    Chloride 102 98 - 107 mmol/L    Carbon Dioxide (CO2) 29 21 - 31 mmol/L    Anion Gap 8 3 - 14 mmol/L    Urea Nitrogen 19 7 - 25 mg/dL    Creatinine 0.89 0.70 - 1.30 mg/dL    Calcium 9.3 8.6 - 10.3 mg/dL    Glucose 242 (H) 70 - 105 mg/dL    Alkaline Phosphatase 75 34 - 104 U/L    AST 15 13 - 39 U/L    ALT 20 7 - 52 U/L    Protein Total 6.6 6.4 - 8.9 g/dL    Albumin 4.3 3.5 - 5.7 g/dL    Bilirubin Total 1.5 (H) 0.3 - 1.0 mg/dL    GFR Estimate 82 >60 mL/min/1.73m2   Lipid Panel     Status: None   Result Value Ref Range    Cholesterol 75 <200 mg/dL    Triglycerides 58 <150 mg/dL    Direct Measure HDL 43 23 - 92 mg/dL    LDL Cholesterol Calculated 20 <=100 mg/dL    Non HDL Cholesterol 32 <130 mg/dL    Patient Fasting > 8hrs? No     Narrative    Cholesterol  Desirable:  <200 mg/dL    Triglycerides  Normal:  Less than 150 mg/dL  Borderline High:  150-199 mg/dL  High:  200-499 mg/dL  Very High:  Greater than or equal to 500 mg/dL    Direct Measure HDL  Female:  Greater than or equal to 50 mg/dL   Male:  Greater than or equal to 40 mg/dL    LDL Cholesterol  Desirable:  <100mg/dL  Above Desirable:  100-129 mg/dL   Borderline High:  130-159 mg/dL   High:  160-189 mg/dL   Very High:  >= 190 mg/dL    Non HDL Cholesterol  Desirable:  130 mg/dL  Above Desirable:  130-159 mg/dL  Borderline High:  160-189 mg/dL  High:  190-219 mg/dL  Very High:  Greater than or equal to 220 mg/dL   Hemoglobin A1c     Status: Normal   Result Value Ref Range    Hemoglobin A1C 6.0 4.0 - 6.2 %   CBC W PLT No Diff     Status: Abnormal   Result Value Ref Range    WBC Count 5.5 4.0 - 11.0 10e3/uL    RBC Count  3.83 (L) 4.40 - 5.90 10e6/uL    Hemoglobin 12.9 (L) 13.3 - 17.7 g/dL    Hematocrit 35.9 (L) 40.0 - 53.0 %    MCV 94 78 - 100 fL    MCH 33.7 (H) 26.5 - 33.0 pg    MCHC 35.9 31.5 - 36.5 g/dL    RDW 13.8 10.0 - 15.0 %    Platelet Count 115 (L) 150 - 450 10e3/uL        Your glucose or diabetes test is extremely high.  It has been high in the past but not this high.  I want you to follow a healthy diet and continue to work on regular exercise.  The rest of your blood tests look fine in general.  Your hemoglobin and platelet counts continue to be a little low but they have not changed significantly.  We will plan to continue to check those on an annual basis.  I want you to return in 3 months so that we can recheck your elevated sugar to make sure that you are not developing overt diabetes.  If you have any questions in the interim, feel free to contact me.    Sincerely,        Cody Souza MD  Internal Medicine  [facility]

## 2022-01-05 NOTE — PROGRESS NOTES
SUBJECTIVE:   Jeffrey Cespedes is a 89 year old male who presents for Preventive Visit.      Patient has been advised of split billing requirements and indicates understanding: Yes   Are you in the first 12 months of your Medicare coverage?  No    HPI     He is here today for Medicare wellness physical.  He is feeling fine.  He has history of SVT controlled with diltiazem and has no further issues.  He has lower urinary tract symptoms and is on Flomax as well as finasteride with good control of his symptoms.  He has osteoarthritis and did have a knee injection last year but his arthritis is not terribly problematic at present.    He really has no other complaints or concerns.  He has a history of hypertension treated with hydrochlorothiazide.  He has a history of hyperglycemia and needs a recheck on that.  He goes to dermatology on a regular basis for his numerous keratoses.  Medications are reconciled.  Past medical history, past surgical history, family history and social histories are reviewed and updated.  Immunizations are reviewed, he is up-to-date other than Shingrix vaccine.      Do you feel safe in your environment? YES    Have you ever done Advance Care Planning? (For example, a Health Directive, POLST, or a discussion with a medical provider or your loved ones about your wishes):        Fall risk  Fall Risk Assessment not completed.    Cognitive Screening   1) Repeat 3 items (Leader, Season, Table)    2) Clock draw: NORMAL  3) 3 item recall: Recalls 1 object   Results: NORMAL clock, 1-2 items recalled: COGNITIVE IMPAIRMENT LESS LIKELY    Mini-CogTM Copyright LAUREL Wiggins. Licensed by the author for use in [facility]; reprinted with permission (albaro@.[facility]). All rights reserved.      Do you have sleep apnea, excessive snoring or daytime drowsiness?: no    Reviewed and updated as needed this visit by clinical staff  Tobacco  Allergies  Meds  Problems  Med Hx  Surg Hx  Fam Hx         Reviewed  and updated as needed this visit by Provider  Tobacco  Allergies  Meds  Problems  Med Hx  Surg Hx  Fam Hx        Social History     Tobacco Use     Smoking status: Former Smoker     Types: Cigarettes     Quit date: 1954     Years since quittin.6     Smokeless tobacco: Never Used   Substance Use Topics     Alcohol use: Yes     Alcohol/week: 4.2 standard drinks     Comment: Occasional     If you drink alcohol do you typically have >3 drinks per day or >7 drinks per week? Yes      Alcohol Use 2022   Prescreen: >3 drinks/day or >7 drinks/week? No   Prescreen: >3 drinks/day or >7 drinks/week? -           Current Outpatient Medications   Medication     ASPIRIN 81 PO     diltiazem ER COATED BEADS (CARDIAZEM LA) 180 MG 24 hr tablet     finasteride (PROSCAR) 5 MG tablet     Glucosamine Sulfate 500 MG TABS     hydrochlorothiazide (HYDRODIURIL) 25 MG tablet     ibuprofen (ADVIL/MOTRIN) 200 MG tablet     loratadine (CLARITIN) 10 MG tablet     magnesium 250 MG tablet     Multiple Vitamin (MULTI-VITAMINS) TABS     tamsulosin (FLOMAX) 0.4 MG capsule     No current facility-administered medications for this visit.         Current providers sharing in care for this patient include:   Patient Care Team:  Cody Souza MD as PCP - General (Internal Medicine)  Cody Souza MD as Assigned PCP  Dameon Cameron MD as Assigned Heart and Vascular Provider  Chaparro Lozano MD as Assigned Musculoskeletal Provider    The following health maintenance items are reviewed in Epic and correct as of today:  Health Maintenance Due   Topic Date Due     ZOSTER IMMUNIZATION (2 of 3) 2010     Lab work is in process          Review of Systems  Constitutional, HEENT, cardiovascular, pulmonary, GI, , musculoskeletal, neuro, skin, endocrine and psych systems are negative, except as otherwise noted.    OBJECTIVE:   /74 (BP Location: Right arm, Patient Position: Sitting, Cuff Size: Adult Regular)   Pulse 91   Temp 96.9  " F (36.1  C) (Tympanic)   Resp 16   Ht 1.708 m (5' 7.25\")   Wt 73.1 kg (161 lb 3.2 oz)   SpO2 98%   BMI 25.06 kg/m   Estimated body mass index is 25.06 kg/m  as calculated from the following:    Height as of this encounter: 1.708 m (5' 7.25\").    Weight as of this encounter: 73.1 kg (161 lb 3.2 oz).  Physical Exam  GENERAL: healthy, alert and no distress  EYES: Eyes grossly normal to inspection, PERRL and conjunctivae and sclerae normal  HENT: ear canals and TM's normal, nose and mouth without ulcers or lesions  NECK: no adenopathy, no asymmetry, masses, or scars and thyroid normal to palpation  RESP: lungs clear to auscultation - no rales, rhonchi or wheezes  CV: regular rate and rhythm, normal S1 S2, no S3 or S4, 2 out of 6 systolic ejection murmur, no click or rub, no peripheral edema and peripheral pulses strong  ABDOMEN: soft, nontender, no hepatosplenomegaly, no masses and bowel sounds normal  : Normal male, no hernia.  3+ prostate without nodularity  MS: no gross musculoskeletal defects noted, no edema  SKIN: no suspicious lesions or rashes  NEURO: Normal strength and tone, mentation intact and speech normal  PSYCH: mentation appears normal, affect normal/bright        ASSESSMENT / PLAN:       ICD-10-CM    1. SVT (supraventricular tachycardia) (H)  I47.1    2. Primary osteoarthritis of left knee  M17.12    3. Lower urinary tract symptoms (LUTS)  R39.9    4. Primary hypertension  I10    5. Hyperglycemia  R73.9        Patient has been advised of split billing requirements and indicates understanding: Yes  COUNSELING:    Overall he appears to be stable.  Medications will continue without change.  Lab is pending, I will send him a letter with the results.  Assuming all goes well, he will follow-up annually.      Reviewed preventive health counseling, as reflected in patient instructions       Regular exercise       Healthy diet/nutrition    Estimated body mass index is 25.06 kg/m  as calculated from the " "following:    Height as of this encounter: 1.708 m (5' 7.25\").    Weight as of this encounter: 73.1 kg (161 lb 3.2 oz).        He reports that he quit smoking about 67 years ago. His smoking use included cigarettes. He has never used smokeless tobacco.      Appropriate preventive services were discussed with this patient, including applicable screening as appropriate for cardiovascular disease, diabetes, osteopenia/osteoporosis, and glaucoma.  As appropriate for age/gender, discussed screening for colorectal cancer, prostate cancer, breast cancer, and cervical cancer. Checklist reviewing preventive services available has been given to the patient.    Reviewed patients plan of care and provided an AVS. The Basic Care Plan (routine screening as documented in Health Maintenance) for Jeffrey meets the Care Plan requirement. This Care Plan has been established and reviewed with the Patient.    Counseling Resources:  ATP IV Guidelines  Pooled Cohorts Equation Calculator  Breast Cancer Risk Calculator  Breast Cancer: Medication to Reduce Risk  FRAX Risk Assessment  ICSI Preventive Guidelines  Dietary Guidelines for Americans, 2010  USDA's MyPlate  ASA Prophylaxis  Lung CA Screening    TOI SINGH MD  [facility]    Identified Health Risks:  "

## 2022-01-11 ENCOUNTER — TELEPHONE (OUTPATIENT)
Dept: INTERNAL MEDICINE | Facility: OTHER | Age: 87
End: 2022-01-11
Payer: COMMERCIAL

## 2022-01-11 NOTE — TELEPHONE ENCOUNTER
Called and spoke with the patient and gave him the below information. He will make an appointment.     Mala Jane LPN on 1/11/2022 at 3:18 PM

## 2022-01-11 NOTE — TELEPHONE ENCOUNTER
Was instructed to come back for a repeat lab work. He is wondering if he needs to have an office visit with Dr. Souza for this or if he can just get lab orders placed and come in for just labs?

## 2022-04-07 ENCOUNTER — OFFICE VISIT (OUTPATIENT)
Dept: INTERNAL MEDICINE | Facility: OTHER | Age: 87
End: 2022-04-07
Attending: INTERNAL MEDICINE
Payer: MEDICARE

## 2022-04-07 VITALS
WEIGHT: 166.6 LBS | SYSTOLIC BLOOD PRESSURE: 130 MMHG | HEART RATE: 95 BPM | TEMPERATURE: 95.9 F | OXYGEN SATURATION: 95 % | BODY MASS INDEX: 25.9 KG/M2 | DIASTOLIC BLOOD PRESSURE: 78 MMHG | RESPIRATION RATE: 18 BRPM

## 2022-04-07 DIAGNOSIS — R73.9 HYPERGLYCEMIA: Primary | ICD-10-CM

## 2022-04-07 DIAGNOSIS — R39.9 LOWER URINARY TRACT SYMPTOMS (LUTS): ICD-10-CM

## 2022-04-07 LAB
FASTING STATUS PATIENT QL REPORTED: YES
GLUCOSE BLD-MCNC: 163 MG/DL (ref 70–105)
HBA1C MFR BLD: 6.1 % (ref 4–6.2)

## 2022-04-07 PROCEDURE — 99213 OFFICE O/P EST LOW 20 MIN: CPT | Performed by: INTERNAL MEDICINE

## 2022-04-07 PROCEDURE — 83036 HEMOGLOBIN GLYCOSYLATED A1C: CPT | Mod: ZL | Performed by: INTERNAL MEDICINE

## 2022-04-07 PROCEDURE — 36415 COLL VENOUS BLD VENIPUNCTURE: CPT | Mod: ZL | Performed by: INTERNAL MEDICINE

## 2022-04-07 PROCEDURE — G0463 HOSPITAL OUTPT CLINIC VISIT: HCPCS

## 2022-04-07 PROCEDURE — 82947 ASSAY GLUCOSE BLOOD QUANT: CPT | Mod: ZL | Performed by: INTERNAL MEDICINE

## 2022-04-07 ASSESSMENT — ENCOUNTER SYMPTOMS
CONSTITUTIONAL NEGATIVE: 1
ENDOCRINE NEGATIVE: 1
EYES NEGATIVE: 1
ALLERGIC/IMMUNOLOGIC NEGATIVE: 1

## 2022-04-07 ASSESSMENT — PAIN SCALES - GENERAL: PAINLEVEL: NO PAIN (0)

## 2022-04-07 NOTE — NURSING NOTE
"Chief Complaint   Patient presents with     Follow Up       Initial /78   Pulse 95   Temp (!) 95.9  F (35.5  C) (Tympanic)   Resp 18   Wt 75.6 kg (166 lb 9.6 oz)   SpO2 95%   BMI 25.90 kg/m   Estimated body mass index is 25.9 kg/m  as calculated from the following:    Height as of 1/5/22: 1.708 m (5' 7.25\").    Weight as of this encounter: 75.6 kg (166 lb 9.6 oz).  FOOD SECURITY SCREENING QUESTIONS  Hunger Vital Signs:  Within the past 12 months we worried whether our food would run out before we got money to buy more. Never  Within the past 12 months the food we bought just didn't last and we didn't have money to get more. Never        Jim Ribeiro, HAFSA    "

## 2022-04-07 NOTE — PROGRESS NOTES
Chief Complaint:  F/U on hyperglycemia.    HPI: He is here today for follow-up.  He had his physical in January and his sugar was 242.  Fortunately, his A1c was normal.  I wanted him to return today to have this rechecked.  He is trying to follow a healthy diet at this time.    He is also having a lot of trouble with urination.  He is urinating frequently, has nocturia x4, and is having some incontinence.  This is despite his current treatment with Flomax and finasteride.    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     Calculus of kidney     1977, 1989,Reoccured     Enlarged prostate with lower urinary tract symptoms (LUTS)     No Comments Provided     Essential (primary) hypertension     No Comments Provided     Osteoarthritis     No Comments Provided     Personal history of other malignant neoplasm of skin (CODE)     2010     SVT (supraventricular tachycardia) (H)      Zoster without complications     2009       Past Surgical History:   Procedure Laterality Date     ARTHROSCOPY SHOULDER Right 2013     COLONOSCOPY      2006,Repeat in 2011     COLONOSCOPY      2011,F/U 2021, if appropriate     EXTRACAPSULAR CATARACT EXTRATION WITH INTRAOCULAR LENS IMPLANT Bilateral      FRACTURE SURGERY      1977,Compression fracture of right tibia. Skiing accident, surgical repair     HERNIA REPAIR, INGUINAL RT/LT Left 2013    with mesh     HERNIA REPAIR, INGUINAL RT/LT Right 2014     SIGMOIDOSCOPY FLEXIBLE      1998,Normal       Allergies   Allergen Reactions     Ace Inhibitors Cough     Iodine Hives       Current Outpatient Medications   Medication Sig Dispense Refill     ASPIRIN 81 PO Take 81 mg by mouth daily       diltiazem ER COATED BEADS (CARDIAZEM LA) 180 MG 24 hr tablet Take 1 tablet (180 mg) by mouth daily 90 tablet 3     finasteride (PROSCAR) 5 MG tablet Take 1 tablet (5 mg) by mouth daily 90 tablet 3      Glucosamine Sulfate 500 MG TABS Take 1,000 mg by mouth daily       hydrochlorothiazide (HYDRODIURIL) 25 MG tablet Take 1 tablet (25 mg) by mouth daily 90 tablet 3     ibuprofen (ADVIL/MOTRIN) 200 MG tablet Take 200 mg by mouth daily       loratadine (CLARITIN) 10 MG tablet Take 10 mg by mouth daily as needed       magnesium 250 MG tablet Take 250 mg by mouth       Multiple Vitamin (MULTI-VITAMINS) TABS Take 1 tablet by mouth daily       tamsulosin (FLOMAX) 0.4 MG capsule Take 1 capsule (0.4 mg) by mouth daily 90 capsule 3       Review of Systems   Constitutional: Negative.    Eyes: Negative.    Endocrine: Negative.    Allergic/Immunologic: Negative.        Physical Exam  Vitals and nursing note reviewed.   Constitutional:       General: He is not in acute distress.     Appearance: Normal appearance. He is not ill-appearing, toxic-appearing or diaphoretic.   Neurological:      Mental Status: He is alert.         Assessment:        ICD-10-CM    1. Hyperglycemia  R73.9 Hemoglobin A1c     Glucose   2. Lower urinary tract symptoms (LUTS)  R39.9 Adult Urology Referral       Plan: Recheck glucose and A1c pending, I will send him a letter with the results.  Hopefully this will look improved.  Referral to urology for his lower urinary tract symptoms to see if they are able to provide any improvement.  He will follow-up with me as needed.

## 2022-04-07 NOTE — LETTER
April 7, 2022      Jeffrey Cespedes  [address] [facility] [address]        Dear Jeffrey,    Below are the results of your recent labs:    Results for orders placed or performed in visit on 04/07/22   Glucose     Status: Abnormal   Result Value Ref Range    Glucose 163 (H) 70 - 105 mg/dL    Patient Fasting > 8hrs? Yes    Hemoglobin A1c     Status: Normal   Result Value Ref Range    Hemoglobin A1C 6.1 4.0 - 6.2 %        Your glucose remains somewhat high but your A1c is still normal.  We will continue to monitor this going forward.  Make sure that you continue to watch a healthy diet which includes limited sugars and carbohydrates.  Assuming all goes well, I would recommend that we recheck you again in 6 months.    Sincerely,        Cody Souza MD  Internal Medicine  [facility]

## 2022-05-18 ENCOUNTER — ALLIED HEALTH/NURSE VISIT (OUTPATIENT)
Dept: FAMILY MEDICINE | Facility: OTHER | Age: 87
End: 2022-05-18
Attending: INTERNAL MEDICINE
Payer: MEDICARE

## 2022-05-18 DIAGNOSIS — Z23 HIGH PRIORITY FOR 2019-NCOV VACCINE: Primary | ICD-10-CM

## 2022-05-18 PROCEDURE — 91305 COVID-19,PF,PFIZER (12+ YRS): CPT

## 2022-10-10 ENCOUNTER — IMMUNIZATION (OUTPATIENT)
Dept: FAMILY MEDICINE | Facility: OTHER | Age: 87
End: 2022-10-10
Payer: MEDICARE

## 2022-10-10 DIAGNOSIS — Z23 HIGH PRIORITY FOR 2019-NCOV VACCINE: ICD-10-CM

## 2022-10-10 DIAGNOSIS — Z23 NEED FOR PROPHYLACTIC VACCINATION AND INOCULATION AGAINST INFLUENZA: ICD-10-CM

## 2022-10-10 PROCEDURE — 91312 COVID-19,PF,PFIZER BOOSTER BIVALENT: CPT

## 2022-10-10 PROCEDURE — G0008 ADMIN INFLUENZA VIRUS VAC: HCPCS

## 2022-10-10 PROCEDURE — 0124A COVID-19,PF,PFIZER BOOSTER BIVALENT: CPT

## 2022-11-07 ENCOUNTER — OFFICE VISIT (OUTPATIENT)
Dept: SURGERY | Facility: OTHER | Age: 87
End: 2022-11-07
Attending: SURGERY
Payer: MEDICARE

## 2022-11-07 VITALS
BODY MASS INDEX: 25.81 KG/M2 | HEART RATE: 97 BPM | SYSTOLIC BLOOD PRESSURE: 122 MMHG | DIASTOLIC BLOOD PRESSURE: 78 MMHG | TEMPERATURE: 97.3 F | OXYGEN SATURATION: 98 % | WEIGHT: 166 LBS | RESPIRATION RATE: 18 BRPM

## 2022-11-07 DIAGNOSIS — C44.91 BASAL CELL ADENOCARCINOMA: Primary | ICD-10-CM

## 2022-11-07 PROCEDURE — 88305 TISSUE EXAM BY PATHOLOGIST: CPT

## 2022-11-07 PROCEDURE — 11603 EXC TR-EXT MAL+MARG 2.1-3 CM: CPT | Performed by: SURGERY

## 2022-11-07 PROCEDURE — G0463 HOSPITAL OUTPT CLINIC VISIT: HCPCS

## 2022-11-07 PROCEDURE — 12032 INTMD RPR S/A/T/EXT 2.6-7.5: CPT | Performed by: SURGERY

## 2022-11-07 ASSESSMENT — PAIN SCALES - GENERAL: PAINLEVEL: NO PAIN (0)

## 2022-11-07 NOTE — PATIENT INSTRUCTIONS
Your incision was closed with stitches that will dissolve.    It is ok to get the incision wet in the shower on the day after your procedure.     Don't soak in a tub, pool or lake for 1 week.If you have concerns, please call.

## 2022-11-07 NOTE — PROGRESS NOTES
Procedure Note     Pre/Post Operative Diagnosis:   Left shoulder basal cell skin cancer    Procedure:    Excision of Left shoulder basal cell skin cancer    Surgeon: CARMEN Zambrano MD     Local Anesthesia: 1% lidocaine with0.25%Marcaine with epinephrine    Indication for the procedure:    This is a 89 year old male patient with Left shoulder basal cell skin cancer.  This lesion was previously biopsied in clinic.  On the left posterior shoulder there is a 1 cm x 0.8 cm wound from previous shave biopsy.  It is difficult to appreciate the original borders of the lesion.  So, I will excise all of the abnormal appearing skin in order to achieve negative margins.  After explaining the risks to include bleeding, infection, recurrence or need for re-excision, and scarring the patient wished to proceed.    Procedure:   The area was prepped and draped in usual sterile fashion with Betadine. After adequate local anesthesia, an elliptical skin incision was made to encompass the lesion, 2.8cm x 1.5 cm with margins. The subcutaneous tissues were reapproximated with 2-0 vicryl. The skin was closed with 4-0 monocryl suture in a running fashion.  Dermabond was applied.     Plan:  The patient will be called with pathology results.  Patient will followup if there any problems with the wound including redness or drainage.      CARMEN Zambrano MD

## 2022-11-07 NOTE — NURSING NOTE
"Chief Complaint   Patient presents with     Derm Problem     Here today with a lesion on his left shoulder       Initial /78 (BP Location: Left arm, Patient Position: Sitting, Cuff Size: Adult Large)   Pulse 97   Temp 97.3  F (36.3  C) (Temporal)   Resp 18   Wt 75.3 kg (166 lb)   SpO2 98%   BMI 25.81 kg/m   Estimated body mass index is 25.81 kg/m  as calculated from the following:    Height as of 1/5/22: 1.708 m (5' 7.25\").    Weight as of this encounter: 75.3 kg (166 lb).  Medication Reconciliation: complete    Alycia Bailey LPN  "

## 2022-11-07 NOTE — NURSING NOTE
"Chief Complaint   Patient presents with     Derm Problem     Here today with a lesion on his left shoulder       Initial /78 (BP Location: Left arm, Patient Position: Sitting, Cuff Size: Adult Large)   Pulse 97   Temp 97.3  F (36.3  C) (Temporal)   Resp 18   Wt 75.3 kg (166 lb)   SpO2 98%   BMI 25.81 kg/m   Estimated body mass index is 25.81 kg/m  as calculated from the following:    Height as of 1/5/22: 1.708 m (5' 7.25\").    Weight as of this encounter: 75.3 kg (166 lb).  Medication Reconciliation: complete    Alycia Bailey LPN     TIMEOUT  [place] Protocol    A. Pre-procedure verification complete     1-relevant information / documentation available, reviewed and properly matched to the patient; 2-consent accurate and complete, 3-equipment and supplies available    B. Site marking complete     Site marked if not in continuous attendance with patient    C. TIME OUT completed     Time Out was conducted just prior to starting procedure to verify the eight required elements: 1-patient identity, 2-consent accurate and complete, 3-position, 4-correct side/site marked (if applicable), 5-procedure, 6-relevant images / results properly labeled and displayed (if applicable), 7-antibiotics / irrigation fluids (if applicable), 8-safety precautions.  "

## 2022-11-10 DIAGNOSIS — I47.10 PAROXYSMAL SUPRAVENTRICULAR TACHYCARDIA (H): ICD-10-CM

## 2022-11-11 LAB
PATH REPORT.COMMENTS IMP SPEC: NORMAL
PATH REPORT.FINAL DX SPEC: NORMAL
PHOTO IMAGE: NORMAL

## 2022-11-14 RX ORDER — DILTIAZEM HYDROCHLORIDE 180 MG/1
CAPSULE, EXTENDED RELEASE ORAL
Qty: 90 CAPSULE | Refills: 1 | Status: SHIPPED | OUTPATIENT
Start: 2022-11-14 | End: 2023-01-11

## 2022-11-14 NOTE — TELEPHONE ENCOUNTER
[facility] sent Rx request for the following:      Requested Prescriptions   Pending Prescriptions Disp Refills     DILT- MG 24 hr capsule [Pharmacy Med Name: DILTIAZEM ER 180MG CAPS (XR-24H)] 90 capsule 1     Sig: TAKE 1 TABLET(180 MG) BY MOUTH DAILY       Calcium Channel Blockers Protocol  Failed - 11/14/2022  2:53 PM        Failed - Medication is active on med list          Last Prescription Date:   1/5/22  Last Fill Qty/Refills:         90, R-3    Last Office Visit:              4/7/22  Future Office visit:           None    Sending to provider for review.Yara Diaz, RN on 11/14/2022 at 2:56 PM

## 2023-01-11 ENCOUNTER — OFFICE VISIT (OUTPATIENT)
Dept: INTERNAL MEDICINE | Facility: OTHER | Age: 88
End: 2023-01-11
Attending: INTERNAL MEDICINE
Payer: COMMERCIAL

## 2023-01-11 ENCOUNTER — HOSPITAL ENCOUNTER (OUTPATIENT)
Dept: GENERAL RADIOLOGY | Facility: OTHER | Age: 88
Discharge: HOME | End: 2023-01-11
Attending: INTERNAL MEDICINE
Payer: MEDICARE

## 2023-01-11 VITALS
TEMPERATURE: 96.4 F | DIASTOLIC BLOOD PRESSURE: 86 MMHG | RESPIRATION RATE: 18 BRPM | WEIGHT: 163 LBS | SYSTOLIC BLOOD PRESSURE: 128 MMHG | OXYGEN SATURATION: 98 % | BODY MASS INDEX: 25.58 KG/M2 | HEIGHT: 67 IN | HEART RATE: 98 BPM

## 2023-01-11 DIAGNOSIS — R73.9 HYPERGLYCEMIA: ICD-10-CM

## 2023-01-11 DIAGNOSIS — M25.562 CHRONIC PAIN OF LEFT KNEE: ICD-10-CM

## 2023-01-11 DIAGNOSIS — I47.10 PAROXYSMAL SUPRAVENTRICULAR TACHYCARDIA (H): ICD-10-CM

## 2023-01-11 DIAGNOSIS — G89.29 CHRONIC PAIN OF LEFT KNEE: ICD-10-CM

## 2023-01-11 DIAGNOSIS — I10 ESSENTIAL HYPERTENSION: ICD-10-CM

## 2023-01-11 DIAGNOSIS — M17.12 PRIMARY OSTEOARTHRITIS OF LEFT KNEE: ICD-10-CM

## 2023-01-11 DIAGNOSIS — I47.10 SVT (SUPRAVENTRICULAR TACHYCARDIA) (H): Primary | ICD-10-CM

## 2023-01-11 DIAGNOSIS — R39.9 LOWER URINARY TRACT SYMPTOMS (LUTS): ICD-10-CM

## 2023-01-11 DIAGNOSIS — I10 PRIMARY HYPERTENSION: ICD-10-CM

## 2023-01-11 LAB
ALBUMIN SERPL BCG-MCNC: 4.4 G/DL (ref 3.5–5.2)
ALP SERPL-CCNC: 82 U/L (ref 40–129)
ALT SERPL W P-5'-P-CCNC: 18 U/L (ref 10–50)
ANION GAP SERPL CALCULATED.3IONS-SCNC: 11 MMOL/L (ref 7–15)
AST SERPL W P-5'-P-CCNC: 20 U/L (ref 10–50)
BILIRUB SERPL-MCNC: 1.5 MG/DL
BUN SERPL-MCNC: 17.3 MG/DL (ref 8–23)
CALCIUM SERPL-MCNC: 9 MG/DL (ref 8.2–9.6)
CHLORIDE SERPL-SCNC: 105 MMOL/L (ref 98–107)
CHOLEST SERPL-MCNC: 123 MG/DL
CREAT SERPL-MCNC: 0.81 MG/DL (ref 0.67–1.17)
DEPRECATED HCO3 PLAS-SCNC: 25 MMOL/L (ref 22–29)
GFR SERPL CREATININE-BSD FRML MDRD: 84 ML/MIN/1.73M2
GLUCOSE SERPL-MCNC: 128 MG/DL (ref 70–99)
HBA1C MFR BLD: 5.8 % (ref 4–6.2)
HDLC SERPL-MCNC: 63 MG/DL
LDLC SERPL CALC-MCNC: 52 MG/DL
NONHDLC SERPL-MCNC: 60 MG/DL
POTASSIUM SERPL-SCNC: 3.9 MMOL/L (ref 3.4–5.3)
PROT SERPL-MCNC: 6.8 G/DL (ref 6.4–8.3)
SODIUM SERPL-SCNC: 141 MMOL/L (ref 136–145)
TRIGL SERPL-MCNC: 40 MG/DL

## 2023-01-11 PROCEDURE — 83036 HEMOGLOBIN GLYCOSYLATED A1C: CPT | Mod: ZL | Performed by: INTERNAL MEDICINE

## 2023-01-11 PROCEDURE — G0439 PPPS, SUBSEQ VISIT: HCPCS | Performed by: INTERNAL MEDICINE

## 2023-01-11 PROCEDURE — 80053 COMPREHEN METABOLIC PANEL: CPT | Mod: ZL | Performed by: INTERNAL MEDICINE

## 2023-01-11 PROCEDURE — 73562 X-RAY EXAM OF KNEE 3: CPT | Mod: LT

## 2023-01-11 PROCEDURE — 80061 LIPID PANEL: CPT | Mod: ZL | Performed by: INTERNAL MEDICINE

## 2023-01-11 PROCEDURE — 36415 COLL VENOUS BLD VENIPUNCTURE: CPT | Mod: ZL | Performed by: INTERNAL MEDICINE

## 2023-01-11 PROCEDURE — G0463 HOSPITAL OUTPT CLINIC VISIT: HCPCS | Mod: 25

## 2023-01-11 PROCEDURE — 99213 OFFICE O/P EST LOW 20 MIN: CPT | Mod: 25 | Performed by: INTERNAL MEDICINE

## 2023-01-11 RX ORDER — DILTIAZEM HYDROCHLORIDE 180 MG/1
180 CAPSULE, EXTENDED RELEASE ORAL DAILY
Qty: 90 CAPSULE | Refills: 3 | Status: SHIPPED | OUTPATIENT
Start: 2023-01-11 | End: 2024-02-27

## 2023-01-11 RX ORDER — HYDROCHLOROTHIAZIDE 25 MG/1
25 TABLET ORAL DAILY
Qty: 90 TABLET | Refills: 3 | Status: SHIPPED | OUTPATIENT
Start: 2023-01-11 | End: 2024-03-01

## 2023-01-11 RX ORDER — TAMSULOSIN HYDROCHLORIDE 0.4 MG/1
0.4 CAPSULE ORAL DAILY
Qty: 90 CAPSULE | Refills: 3 | Status: SHIPPED | OUTPATIENT
Start: 2023-01-11 | End: 2024-02-07

## 2023-01-11 RX ORDER — FINASTERIDE 5 MG/1
5 TABLET, FILM COATED ORAL DAILY
Qty: 90 TABLET | Refills: 3 | Status: SHIPPED | OUTPATIENT
Start: 2023-01-11 | End: 2024-03-01

## 2023-01-11 ASSESSMENT — PAIN SCALES - GENERAL: PAINLEVEL: MODERATE PAIN (4)

## 2023-01-11 NOTE — PATIENT INSTRUCTIONS
Continue your medications without change.    Complete blood tests today, I will send you a letter with the results.    I have put in a consult request with orthopedics for your knee, they will call you to schedule the appointment.    Assuming all goes well, follow-up annually.

## 2023-01-11 NOTE — PROGRESS NOTES
SUBJECTIVE:   Jeffrey is a 89 year old who presents for Preventive Visit.  Patient has been advised of split billing requirements and indicates understanding: Yes  Are you in the first 12 months of your Medicare coverage?  No    HPI     He is here today for Medicare wellness visit.  In most respects he is feeling fairly good.  Arthritis is starting to bother him more more.  His knees are bothering him to the point where he is not as stable as he would like to be.  He wonders if they can be injected with cortisone.  He tells me that the left knee seems to be worse than the right.    He has a history of hypertension and SVT.  He is controlled with diltiazem.  He is not having any further episodes of SVT.  No endorgan disease.  He has significant lower urinary tract symptoms.  He takes finasteride and Flomax daily to control his symptoms and generally he is doing adequately with that.  He has a history of hyperglycemia with a normal A1c.  He admits that he does not follow a diet as he should.    Other than that he really feels well.  Medications are reconciled.  Past medical history, past surgical history, family history and social histories are reviewed and updated.  Immunizations are reviewed and are up-to-date other than a tetanus shot.    Have you ever done Advance Care Planning? (For example, a Health Directive, POLST, or a discussion with a medical provider or your loved ones about your wishes): Yes, advance care planning is on file.       Fall risk  Fallen 2 or more times in the past year?: No  Any fall with injury in the past year?: No    Cognitive Screening   1) Repeat 3 items (Leader, Season, Table)    2) Clock draw: NORMAL  3) 3 item recall: Recalls 3 objects  Results: 3 items recalled: COGNITIVE IMPAIRMENT LESS LIKELY    Mini-CogTM Copyright S Feliciano. Licensed by the author for use in [facility]; reprinted with permission (albaro@.[facility]). All rights reserved.      Do you have sleep apnea,  excessive snoring or daytime drowsiness?: no    Reviewed and updated as needed this visit by clinical staff   Tobacco  Allergies  Meds  Problems  Med Hx  Surg Hx  Fam Hx          Reviewed and updated as needed this visit by Provider   Tobacco  Allergies  Meds  Problems  Med Hx  Surg Hx  Fam Hx         Social History     Tobacco Use     Smoking status: Former     Types: Cigarettes     Quit date: 1954     Years since quittin.6     Smokeless tobacco: Never   Substance Use Topics     Alcohol use: Yes     Alcohol/week: 4.2 standard drinks     Comment: Occasional         Alcohol Use 2022   Prescreen: >3 drinks/day or >7 drinks/week? No   Prescreen: >3 drinks/day or >7 drinks/week? -           Current Outpatient Medications   Medication     ASPIRIN 81 PO     DILT- MG 24 hr capsule     finasteride (PROSCAR) 5 MG tablet     Glucosamine Sulfate 500 MG TABS     hydrochlorothiazide (HYDRODIURIL) 25 MG tablet     ibuprofen (ADVIL/MOTRIN) 200 MG tablet     loratadine (CLARITIN) 10 MG tablet     magnesium 250 MG tablet     Multiple Vitamin (MULTI-VITAMINS) TABS     tamsulosin (FLOMAX) 0.4 MG capsule     No current facility-administered medications for this visit.         Current providers sharing in care for this patient include:   Patient Care Team:  Cody Souza MD as PCP - General (Internal Medicine)  Cody Souza MD as Assigned PCP  Bienvenido Zambrano MD as Assigned Surgical Provider    The following health maintenance items are reviewed in Epic and correct as of today:  Health Maintenance   Topic Date Due     DTAP/TDAP/TD IMMUNIZATION (2 - Td or Tdap) 2022     MEDICARE ANNUAL WELLNESS VISIT  2024     FALL RISK ASSESSMENT  2024     ADVANCE CARE PLANNING  2028     PHQ-2 (once per calendar year)  Completed     INFLUENZA VACCINE  Completed     COVID-19 Vaccine  Completed     Pneumococcal Vaccine: 65+ Years  Addressed     IPV IMMUNIZATION  Aged Out     MENINGITIS  "IMMUNIZATION  Aged Out     ZOSTER IMMUNIZATION  Discontinued     Lab work is in process          Review of Systems  Constitutional, HEENT, cardiovascular, pulmonary, GI, , musculoskeletal, neuro, skin, endocrine and psych systems are negative, except as otherwise noted.    OBJECTIVE:   /86   Pulse 98   Temp (!) 96.4  F (35.8  C) (Temporal)   Resp 18   Ht 1.695 m (5' 6.75\")   Wt 73.9 kg (163 lb)   SpO2 98%   BMI 25.72 kg/m   Estimated body mass index is 25.72 kg/m  as calculated from the following:    Height as of this encounter: 1.695 m (5' 6.75\").    Weight as of this encounter: 73.9 kg (163 lb).  Physical Exam  GENERAL: healthy, alert and no distress  EYES: Eyes grossly normal to inspection, PERRL and conjunctivae and sclerae normal  HENT: ear canals and TM's normal, nose and mouth without ulcers or lesions  NECK: no adenopathy, no asymmetry, masses, or scars and thyroid normal to palpation  RESP: lungs clear to auscultation - no rales, rhonchi or wheezes  CV: regular rate and rhythm, normal S1 S2, no S3 or S4, 2 out of 6 murmur, no click or rub, no peripheral edema and peripheral pulses strong  ABDOMEN: soft, nontender, no hepatosplenomegaly, no masses and bowel sounds normal  : Normal male, no hernia.  Prostate 2-3+ without nodularity  MS: no gross musculoskeletal defects noted, no edema.  Knees show some osteoarthritic changes, no effusion  SKIN: no suspicious lesions or rashes.  Chronic eczematous patch anterior left shin  NEURO: Normal strength and tone, mentation intact and speech normal  PSYCH: mentation appears normal, affect normal/bright        ASSESSMENT / PLAN:       ICD-10-CM    1. SVT (supraventricular tachycardia) (H)  I47.1       2. Primary hypertension  I10 Comprehensive metabolic panel     Lipid Panel      3. Hyperglycemia  R73.9 Hemoglobin A1c      4. Lower urinary tract symptoms (LUTS)  R39.9       5. Primary osteoarthritis of left knee  M17.12       6. Chronic pain of left " knee  M25.562 XR Knee Left 3 Views    G89.29           Patient has been advised of split billing requirements and indicates understanding: Yes      COUNSELING:    His exam today is fairly unremarkable and stable.  His long-term medications will continue with no changes, refills were done as needed.  Complete lab drawn and pending, I will send him a letter with the results.  If all goes well, follow-up with me annually.    In regards to his knee, x-ray today showed significant degenerative changes with medial joint space narrowing.  I recommended orthopedic consultation to review this further.  He is in agreement, consult is placed.    Reviewed preventive health counseling, as reflected in patient instructions       Regular exercise       Healthy diet/nutrition        He reports that he quit smoking about 68 years ago. His smoking use included cigarettes. He has never used smokeless tobacco.      Appropriate preventive services were discussed with this patient, including applicable screening as appropriate for cardiovascular disease, diabetes, osteopenia/osteoporosis, and glaucoma.  As appropriate for age/gender, discussed screening for colorectal cancer, prostate cancer, breast cancer, and cervical cancer. Checklist reviewing preventive services available has been given to the patient.    Reviewed patients plan of care and provided an AVS. The Basic Care Plan (routine screening as documented in Health Maintenance) for Jeffrey meets the Care Plan requirement. This Care Plan has been established and reviewed with the Patient.      TOI SINGH MD  [facility]    Identified Health Risks:

## 2023-01-11 NOTE — LETTER
January 12, 2023      Jeffrey Cespedes  [address] [place] [facility] [address]        Dear Jeffrey,    Below are the results of your recent labs:    Results for orders placed or performed during the hospital encounter of 01/11/23   XR Knee Left 3 Views     Status: None    Narrative    PROCEDURE: XR KNEE LEFT 3 VIEWS 1/11/2023 2:49 PM    HISTORY: Chronic pain of left knee; Chronic pain of left knee    COMPARISONS: 7/27/2020.    TECHNIQUE: 3 views.    FINDINGS: There is fairly severe narrowing of the medial joint space  with probable osteochondral lesion in the medial femoral condyle.  Lateral joint space is better preserved but there are lateral spurs  and there are posterior patellar spurs with some narrowing of the  patellofemoral joint as well.    No acute fracture or dislocation is seen. There is a small amount of  fluid in the joint. There is a posterior loose body which may be a  popliteal cyst. It was also seen previously.         Impression    IMPRESSION: Degenerative change most severe in the medial compartment.    NICK MEI MD         SYSTEM ID:  [id number]   Results for orders placed or performed in visit on 01/11/23   Comprehensive metabolic panel     Status: Abnormal   Result Value Ref Range    Sodium 141 136 - 145 mmol/L    Potassium 3.9 3.4 - 5.3 mmol/L    Chloride 105 98 - 107 mmol/L    Carbon Dioxide (CO2) 25 22 - 29 mmol/L    Anion Gap 11 7 - 15 mmol/L    Urea Nitrogen 17.3 8.0 - 23.0 mg/dL    Creatinine 0.81 0.67 - 1.17 mg/dL    Calcium 9.0 8.2 - 9.6 mg/dL    Glucose 128 (H) 70 - 99 mg/dL    Alkaline Phosphatase 82 40 - 129 U/L    AST 20 10 - 50 U/L    ALT 18 10 - 50 U/L    Protein Total 6.8 6.4 - 8.3 g/dL    Albumin 4.4 3.5 - 5.2 g/dL    Bilirubin Total 1.5 (H) <=1.2 mg/dL    GFR Estimate 84 >60 mL/min/1.73m2   Lipid Panel     Status: Normal   Result Value Ref Range    Cholesterol 123 <200 mg/dL    Triglycerides 40 <150 mg/dL    Direct Measure HDL 63 >=40 mg/dL    LDL Cholesterol  Calculated 52 <=100 mg/dL    Non HDL Cholesterol 60 <130 mg/dL    Narrative    Cholesterol  Desirable:  <200 mg/dL    Triglycerides  Normal:  Less than 150 mg/dL  Borderline High:  150-199 mg/dL  High:  200-499 mg/dL  Very High:  Greater than or equal to 500 mg/dL    Direct Measure HDL  Female:  Greater than or equal to 50 mg/dL   Male:  Greater than or equal to 40 mg/dL    LDL Cholesterol  Desirable:  <100mg/dL  Above Desirable:  100-129 mg/dL   Borderline High:  130-159 mg/dL   High:  160-189 mg/dL   Very High:  >= 190 mg/dL    Non HDL Cholesterol  Desirable:  130 mg/dL  Above Desirable:  130-159 mg/dL  Borderline High:  160-189 mg/dL  High:  190-219 mg/dL  Very High:  Greater than or equal to 220 mg/dL   Hemoglobin A1c     Status: Normal   Result Value Ref Range    Hemoglobin A1C 5.8 4.0 - 6.2 %        Overall, your blood tests look fine.  Congratulations on this report.  If you have any questions about your results, feel free to contact me.    Sincerely,        Cody Souza MD  Internal Medicine  [facility]

## 2023-02-02 DIAGNOSIS — R39.9 LOWER URINARY TRACT SYMPTOMS (LUTS): ICD-10-CM

## 2023-02-03 RX ORDER — TAMSULOSIN HYDROCHLORIDE 0.4 MG/1
CAPSULE ORAL
Qty: 90 CAPSULE | Refills: 3 | OUTPATIENT
Start: 2023-02-03

## 2023-02-03 NOTE — TELEPHONE ENCOUNTER
Trevor sent Rx request for the following:    TAMSULOSIN 0.4MG CAPSULES  Last Prescription Date:   1/11/23  Last Fill Qty/Refills:         90, R-3    Last Office Visit:              1/11/23   Future Office visit:           None   Redundant refill request refused: Too soon:  Yolette Ng RN on 2/3/2023 at 3:28 PM

## 2023-02-06 ENCOUNTER — HOSPITAL ENCOUNTER (OUTPATIENT)
Dept: GENERAL RADIOLOGY | Facility: OTHER | Age: 88
Discharge: HOME | End: 2023-02-06
Attending: FAMILY MEDICINE
Payer: MEDICARE

## 2023-02-06 ENCOUNTER — OFFICE VISIT (OUTPATIENT)
Dept: FAMILY MEDICINE | Facility: OTHER | Age: 88
End: 2023-02-06
Attending: INTERNAL MEDICINE
Payer: MEDICARE

## 2023-02-06 VITALS
WEIGHT: 168.8 LBS | BODY MASS INDEX: 26.64 KG/M2 | RESPIRATION RATE: 18 BRPM | DIASTOLIC BLOOD PRESSURE: 78 MMHG | HEART RATE: 103 BPM | TEMPERATURE: 97.3 F | SYSTOLIC BLOOD PRESSURE: 122 MMHG | OXYGEN SATURATION: 97 %

## 2023-02-06 DIAGNOSIS — M17.12 PRIMARY OSTEOARTHRITIS OF LEFT KNEE: Primary | ICD-10-CM

## 2023-02-06 DIAGNOSIS — M17.11 PRIMARY OSTEOARTHRITIS OF RIGHT KNEE: ICD-10-CM

## 2023-02-06 PROCEDURE — 73560 X-RAY EXAM OF KNEE 1 OR 2: CPT | Mod: LT

## 2023-02-06 PROCEDURE — G0463 HOSPITAL OUTPT CLINIC VISIT: HCPCS

## 2023-02-06 PROCEDURE — 99214 OFFICE O/P EST MOD 30 MIN: CPT | Performed by: FAMILY MEDICINE

## 2023-02-06 PROCEDURE — G0463 HOSPITAL OUTPT CLINIC VISIT: HCPCS | Mod: 25

## 2023-02-06 ASSESSMENT — PAIN SCALES - GENERAL: PAINLEVEL: MILD PAIN (3)

## 2023-02-06 NOTE — PROGRESS NOTES
Sports Medicine Office Note    HPI:  89-year-old male coming in for evaluation of bilateral knee pain, left worse than right.  His pain has been present for multiple years, gradually worsening.  He rates his pain at 3/10.  He characterizes the pain as achy.  His left knee pain is more anterior and right knee pain is more lateral.  He has difficulty with standing, running, jumping, and going up/down stairs.  He has tried heat and 1 previous CSI (approximately 2 years ago with minimal improvement in symptoms).  He does note some weakness/feelings of instability and worries that he may have a fall due to his knee symptoms.      EXAM:  /78 (BP Location: Right arm, Patient Position: Sitting, Cuff Size: Adult Regular)   Pulse 103   Temp 97.3  F (36.3  C) (Temporal)   Resp 18   Wt 76.6 kg (168 lb 12.8 oz)   SpO2 97%   BMI 26.64 kg/m    MUSCULOSKELETAL EXAM:  RIGHT KNEE  Inspection:  -No gross deformity  -No bruising or soft tissue swelling    Tenderness to palpation of the:  -Quadriceps musculature:  Negative  -Quadriceps tendon:  Negative  -Patella:  Negative  -Medial patellar facet:  Negative  -Lateral patellar facet:  Negative  -Inferior pole of the patella:  Negative  -Patellar tendon:  Negative  -Tibial tuberosity:  Negative  -Medial joint line:  Negative  -Medial collateral ligament:  Negative  -Medial hamstring tendons:  Negative  -Medial femoral condyle:  Negative  -Medial tibial plateau:  Negative  -Pes anserine bursa:  Negative  -Lateral joint line: Mild pain  -Distal IT band:  Negative  -Gerdy's tubercle:  Negative  -Lateral collateral ligament:  Negative  -Lateral hamstring tendons:  Negative  -Lateral femoral condyle:  Negative  -Lateral tibial plateau:  Negative  -Posterior lateral corner:  Negative  -Popliteal fossa:  Negative    Range of Motion:  -Passive flexion:  115  -Passive extension:  0    Strength:  -Extension:  5/5  -Flexion:  5/5    Special Tests:  -Effusion:  Absent   -Medial patellar  glide:  Negative  -Lateral patellar glide:  Negative  -Patellar apprehension:  Negative  -Medial Katy's:  Negative  -Lateral Katy's:  Negative  -Valgus stress:  Negative  -Varus stress:  Negative  -Lachman test:  Negative  -Anterior drawer:  Negative  -Posterior drawer:  Negative    Other:  -Intact sensation to light touch distally.  -No signs of cyanosis. Normal skin temperature of the lower extremity.  -Foot/ankle:  No gross deformity. Full range of motion.    MUSCULOSKELETAL EXAM:  LEFT KNEE  Inspection:  -No gross deformity  -No bruising or soft tissue swelling    Tenderness to palpation of the:  -Quadriceps musculature:  Negative  -Quadriceps tendon:  Negative  -Patella:  Negative  -Medial patellar facet:  Negative  -Lateral patellar facet:  Negative  -Inferior pole of the patella:  Negative  -Patellar tendon:  Negative  -Tibial tuberosity:  Negative  -Medial joint line: Positive  -Medial collateral ligament:  Negative  -Medial hamstring tendons:  Negative  -Medial femoral condyle:  Negative  -Medial tibial plateau:  Negative  -Pes anserine bursa:  Negative  -Lateral joint line:  Negative  -Distal IT band:  Negative  -Gerdy's tubercle:  Negative  -Lateral collateral ligament:  Negative  -Lateral hamstring tendons:  Negative  -Lateral femoral condyle:  Negative  -Lateral tibial plateau:  Negative  -Posterior lateral corner:  Negative  -Popliteal fossa:  Negative    Range of Motion:  -Passive flexion:  115  -Passive extension:  0    Strength:  -Extension:  5/5  -Flexion:  5/5    Special Tests:  -Effusion:  Absent   -Medial patellar glide:  Negative  -Lateral patellar glide:  Negative  -Patellar apprehension:  Negative  -Medial Katy's:  Negative  -Lateral Katy's:  Negative  -Valgus stress:  Negative  -Varus stress:  Negative  -Lachman test:  Negative  -Anterior drawer:  Negative  -Posterior drawer:  Negative    Other:  -Intact sensation to light touch distally.  -No signs of cyanosis. Normal skin  temperature of the lower extremity.  -Foot/ankle:  No gross deformity. Full range of motion.      IMAGIN2023: 3 view left knee x-ray  - Moderate-severe medial tibiofemoral compartment joint space narrowing with indication of a previous osteochondral injury involving the medial femoral condyle.  Calcification posterior to the medial femoral condyle seen best on lateral view which could represent a joint body though more likely a soft tissue calcification.  Chondrocalcinosis noted.    2023: 4 view right knee x-ray  - Moderate-severe lateral tibiofemoral compartment joint space narrowing with associated osteophytosis.  Vascular calcifications noted posteriorly.  Moderate degenerative changes within the patellofemoral joint.  Chondrocalcinosis noted.  - Left knee shows similar findings to the x-ray seen on  without acute bony abnormality      ASSESSMENT/PLAN:  Diagnoses and all orders for this visit:  Primary osteoarthritis of left knee  -     Orthopedic  Referral  -     Physical Therapy Referral; Future  Primary osteoarthritis of right knee  -     XR Knee Standing 2v  Bilateral & 2v Right  -     Physical Therapy Referral; Future    89-year-old male with bilateral knee osteoarthritis.  X-rays from  and  were both personally reviewed in the office with findings as demonstrated above by my interpretation.  We discussed treatment options for this condition to include aerobic activity, physical therapy, weight loss, topical medications, oral medications, icing, injections, and TKA.  He will try to incorporate some of the nonmedical interventions to help with his symptoms.  After discussing injection options, we decided to move forward with viscosupplementation as he did not get significant symptom relief with his previous CSI to the left knee.  - Encourage patient to increase aerobic activity  - Referral placed to physical therapy to help with strengthening, function, and stability  - We  will submit for authorization to move forward with viscosupplementation injections  - Follow-up in the office for ultrasound-guided bilateral viscosupplementation injections once approval has been received      Davon Cochran MD  2/6/2023  1:48 PM    Total time spent with this patient was 27 minutes which included chart review, visualization and independent interpretation of images, time spent with the patient, and documentation.    Procedure time:  0 minute(s)

## 2023-02-06 NOTE — NURSING NOTE
"Chief Complaint   Patient presents with     Knee Pain     Bilateral knee pain, left worse than right     Patient presents for bilateral knee pain, left worse than right. Pain 3/10. No known injury. Patient denies past knee surgery. Patient thinks he may have had cortisone injection in his knee years ago.     Initial /78 (BP Location: Right arm, Patient Position: Sitting, Cuff Size: Adult Regular)   Pulse 103   Temp 97.3  F (36.3  C) (Temporal)   Resp 18   Wt 76.6 kg (168 lb 12.8 oz)   SpO2 97%   BMI 26.64 kg/m   Estimated body mass index is 26.64 kg/m  as calculated from the following:    Height as of 1/11/23: 1.695 m (5' 6.75\").    Weight as of this encounter: 76.6 kg (168 lb 12.8 oz).       Medication Reconciliation: Complete    Tonya Amaro LPN .......  2/6/2023  1:05 PM   "

## 2023-02-22 ENCOUNTER — TELEPHONE (OUTPATIENT)
Dept: FAMILY MEDICINE | Facility: OTHER | Age: 88
End: 2023-02-22
Payer: COMMERCIAL

## 2023-02-22 NOTE — TELEPHONE ENCOUNTER
Left message for patient to let him know that he was approved for bilateral Synvisc injections in his knees.    Tonya Amaro LPN .......  2/22/2023  9:32 AM

## 2023-03-02 ENCOUNTER — OFFICE VISIT (OUTPATIENT)
Dept: FAMILY MEDICINE | Facility: OTHER | Age: 88
End: 2023-03-02
Attending: FAMILY MEDICINE
Payer: MEDICARE

## 2023-03-02 VITALS
SYSTOLIC BLOOD PRESSURE: 120 MMHG | TEMPERATURE: 97.8 F | RESPIRATION RATE: 16 BRPM | BODY MASS INDEX: 26.92 KG/M2 | HEART RATE: 89 BPM | DIASTOLIC BLOOD PRESSURE: 68 MMHG | WEIGHT: 170.6 LBS | OXYGEN SATURATION: 97 %

## 2023-03-02 DIAGNOSIS — M17.11 PRIMARY OSTEOARTHRITIS OF RIGHT KNEE: Primary | ICD-10-CM

## 2023-03-02 DIAGNOSIS — M17.12 PRIMARY OSTEOARTHRITIS OF LEFT KNEE: ICD-10-CM

## 2023-03-02 PROCEDURE — 250N000011 HC RX IP 250 OP 636: Performed by: FAMILY MEDICINE

## 2023-03-02 PROCEDURE — 20611 DRAIN/INJ JOINT/BURSA W/US: CPT | Performed by: FAMILY MEDICINE

## 2023-03-02 PROCEDURE — G0463 HOSPITAL OUTPT CLINIC VISIT: HCPCS

## 2023-03-02 PROCEDURE — 250N000009 HC RX 250: Performed by: FAMILY MEDICINE

## 2023-03-02 RX ORDER — LIDOCAINE HYDROCHLORIDE 10 MG/ML
2 INJECTION, SOLUTION EPIDURAL; INFILTRATION; INTRACAUDAL; PERINEURAL ONCE
Status: COMPLETED | OUTPATIENT
Start: 2023-03-02 | End: 2023-03-02

## 2023-03-02 RX ORDER — KETOCONAZOLE 20 MG/G
CREAM TOPICAL
COMMUNITY
Start: 2021-12-01

## 2023-03-02 RX ADMIN — Medication 48 MG: at 11:51

## 2023-03-02 RX ADMIN — Medication 48 MG: at 11:52

## 2023-03-02 RX ADMIN — LIDOCAINE HYDROCHLORIDE 2 ML: 10 INJECTION, SOLUTION EPIDURAL; INFILTRATION; INTRACAUDAL; PERINEURAL at 11:51

## 2023-03-02 ASSESSMENT — PAIN SCALES - GENERAL: PAINLEVEL: MILD PAIN (3)

## 2023-03-02 NOTE — PROGRESS NOTES
Sports Medicine Office Note    HPI:  89-year-old male coming in for follow-up evaluation of bilateral knee pain, left worse than right.  His pain has been present for multiple years, gradually worsening.  He was last seen in this office on .  At that time we discussed moving forward with bilateral viscosupplementation injections.  He comes in today for these procedures.  He rates his pain at 3/10.  He characterizes the pain as achy.  He has tried using a Nordic track to increase his aerobic activity since he was last seen.  He has therapy set up.  No new injuries.      EXAM:  /68   Pulse 89   Temp 97.8  F (36.6  C) (Tympanic)   Resp 16   Wt 77.4 kg (170 lb 9.6 oz)   SpO2 97%   BMI 26.92 kg/m    Musculoskeletal exam: Bilateral knees  - No gross deformity  - No bruising or swelling  - Normal skin temperature without cyanosis      IMAGIN2023: 3 view left knee x-ray  - Moderate-severe medial tibiofemoral compartment joint space narrowing with indication of a previous osteochondral injury involving the medial femoral condyle.  Calcification posterior to the medial femoral condyle seen best on lateral view which could represent a joint body though more likely a soft tissue calcification.  Chondrocalcinosis noted.     2023: 4 view right knee x-ray  - Moderate-severe lateral tibiofemoral compartment joint space narrowing with associated osteophytosis.  Vascular calcifications noted posteriorly.  Moderate degenerative changes within the patellofemoral joint.  Chondrocalcinosis noted.  - Left knee shows similar findings to the x-ray seen on  without acute bony abnormality      ASSESSMENT/PLAN:  Diagnoses and all orders for this visit:  Primary osteoarthritis of right knee  -     hylan G-F 20 (SYNVISC) injection 48 mg  -     lidocaine (PF) (XYLOCAINE) 1 % injection 2 mL  -     CO ARTHROCENTESIS ASPIR&/INJ MAJOR JT/BURSA W/US  -     POC US SOFT TISSUE  Primary osteoarthritis of left knee  -      POC US SOFT TISSUE  -     hylan G-F 20 (SYNVISC) injection 48 mg  -     lidocaine (PF) (XYLOCAINE) 1 % injection 2 mL  -     MD ARTHROCENTESIS ASPIR&/INJ MAJOR JT/BURSA W/US    89-year-old male with bilateral knee osteoarthritis.  X-rays from 1/11 and 2/6 were both personally reviewed in the office with findings as demonstrated above by my interpretation.  Treatment options for this condition include aerobic activity, physical therapy, weight loss, topical medications, oral medications, icing, injections, and TKA.  After reviewing the risks/benefits, the patient elects to proceed with bilateral viscosupplementation injections under ultrasound guidance.  See procedure notes below:    PROCEDURE:  Ultrasound Guided Aspiration/Injection of the Bilateral Intraarticular Knees with Synvisc One      EQUIPMENT:  Michelle [employer] 70 with Linear eL18-4 (2-22MHz) Transducer.      PROCEDURAL PAUSE:    A procedural pause was conducted to verify:  correct patient identity, procedure to be performed, and as applicable, correct side, site, and correct patient position.      INFORMED CONSENT:   I discussed the risks, possible benefits, and alternatives to injection.  Following denial of allergy and review of potential side effects and complications (including but not necessarily limited to infection, allergic reaction, fat necrosis, skin depigmentation, local tissue breakdown, and injury to soft tissue and/or nerves), all questions were answered, and consent was given to proceed.  Patient verbalized understanding.      PROCEDURE DETAILS:    The use of direct ultrasound visualization of the needle (rather than a non-guided ultrasound procedure) was required to increase patient safety by excluding inadvertent intramuscular or intratendinous placement and minimizing bleeding and injury by avoiding osteochondral and nearby neurovascular structures.  Guidance also maximizes accurate injection placement and likely clinical benefit beyond  that obtained with a non-guided injection.  This allows increased diagnostic specificity when evaluating effectiveness of the injection.     Prior to the procedure, the right knee was examined with a 12MHz linear transducer to determine the optimal needle path and visualize the quadriceps tendon superficial to the suprapatellar recess in longitudinal and transverse views.  Following this, the skin was marked, and the right knee was prepared with chlorhexidine.  Local anesthesia was obtained with 2mL of 1% lidocaine.  Then this area was re-examined using the same transducer, a sterile ultrasound transducer cover, sterile gloves, and sterile gel.  Under ultrasound guidance, a 2-inch 21-gauge needle was advanced from lateral to medial using an in-plane approach into the suprapatellar recess of the knee joint space.  One needle pass was performed.  After ultrasound visualization of the needle tip in the target area 30mL of clear yellow joint fluid was aspirated, the syringe was switched, and 6mL of Synvisc One (8mg/mL) was injected into the right suprapatellar space.  0mL was wasted.  Images have been saved on the musculoskeletal ultrasound in clinic.     Prior to the procedure, the left knee was examined with a 12MHz linear transducer to determine the optimal needle path and visualize the quadriceps tendon superficial to the suprapatellar recess in longitudinal and transverse views.  Following this, the skin was marked, and the left knee was prepared with chlorhexidine.  Local anesthesia was obtained with 2mL of 1% lidocaine.  Then this area was re-examined using the same transducer, a sterile ultrasound transducer cover, sterile gloves, and sterile gel.  Under ultrasound guidance, a 1.5-inch 18-gauge needle was advanced from lateral to medial using an in-plane approach into the suprapatellar recess of the knee joint space.  One needle pass was performed.  After ultrasound visualization of the needle tip in the target  area 22mL of clear yellow joint fluid was aspirated, the syringe was switched, and 6mL of Synvisc One (8mg/mL) was injected into the left suprapatellar space.  0mL was wasted.  Images have been saved on the musculoskeletal ultrasound in clinic.      The patient tolerated the procedures without complication and was discharged in good condition after a short observation period.  The patient was instructed to contact me regarding any questions pertaining to the procedure.      DIAGNOSIS:    -Successful ultrasound guided aspiration and Synvisc One injections of the bilateral intraarticular knees without immediate complication      PLAN:   -Post-procedure care reviewed, including avoiding submersion of the injection sites for 48 hours   -Return precautions reviewed for signs/symptoms that would be concerning for infection   -The patient was instructed to ice and take APAP this evening if needed   -Continue to work on increasing aerobic activity  -Patient should look to continue to move forward with PT  -Return to clinic as needed      Davon Cochran MD  3/2/2023  10:43 AM    Total time spent with this patient was 53 minutes which included chart review, visualization and independent interpretation of images, time spent with the patient, and documentation.    Procedure time:  34 minute(s)

## 2023-03-02 NOTE — NURSING NOTE
Chief Complaint   Patient presents with     Imm/Inj     Ultrasound Guided Bilateral Synvisc injection to Knee         Medication Reconciliation: alyssa Dhillon

## 2023-03-08 ENCOUNTER — HOSPITAL ENCOUNTER (OUTPATIENT)
Dept: PHYSICAL THERAPY | Facility: OTHER | Age: 88
Setting detail: THERAPIES SERIES
Discharge: HOME | End: 2023-03-08
Attending: FAMILY MEDICINE
Payer: MEDICARE

## 2023-03-08 DIAGNOSIS — M17.12 PRIMARY OSTEOARTHRITIS OF LEFT KNEE: ICD-10-CM

## 2023-03-08 DIAGNOSIS — M17.11 PRIMARY OSTEOARTHRITIS OF RIGHT KNEE: ICD-10-CM

## 2023-03-08 PROCEDURE — 97162 PT EVAL MOD COMPLEX 30 MIN: CPT | Mod: GP | Performed by: PHYSICAL THERAPIST

## 2023-03-08 PROCEDURE — 97110 THERAPEUTIC EXERCISES: CPT | Mod: GP | Performed by: PHYSICAL THERAPIST

## 2023-03-08 NOTE — PROGRESS NOTES
[facility]    OUTPATIENT PHYSICAL THERAPY ORTHOPEDIC EVALUATION  PLAN OF TREATMENT FOR OUTPATIENT REHABILITATION  (COMPLETE FOR INITIAL CLAIMS ONLY)  Patient's Last Name, First Name, M.I.  YOB: 1932  Jeffrey Cespedes    Provider s Name:  [facility]   Medical Record No.  [id number]   Start of Care Date:  03/08/23   Onset Date:  03/02/23   Type:     _X__PT   ___OT   ___SLP Medical Diagnosis:  Primary osteoarthritis of left knee (M17.12)     Primary osteoarthritis of right knee (M17.11)     PT Diagnosis:  tight psoterior knee, OA of knees.   Visits from SOC:  1      _________________________________________________________________________________  Plan of Treatment/Functional Goals:  balance training, gait training, joint mobilization, manual therapy, neuromuscular re-education, ROM, strengthening, stretching     Cryotherapy, Hot packs, Ultrasound     Goals  Goal Identifier: Home exercise program  Goal Description: Patient will maintain home exercise program at least 3 times a week to improve range of motion strength and improved balance.  Target Date: 05/26/23    Goal Identifier: Range of motion  Goal Description: To improve Ext ROM  to near 0 for better walking ROM and stride.  Target Date: 05/26/23    Goal Identifier: stairs  Goal Description: toerate stairs with and or without railing to better get around hockey mcgregor.  Target Date: 05/26/23                  Therapy Frequency:  2 times/Week  Predicted Duration of Therapy Intervention:  8 wks    Scott Bryant, PT                 I CERTIFY THE NEED FOR THESE SERVICES FURNISHED UNDER        THIS PLAN OF TREATMENT AND WHILE UNDER MY CARE     (Physician co-signature of this document indicates review and certification of the therapy plan).                     Certification Date From:  03/08/23   Certification Date To:   05/26/23    Referring Provider:  JOEY Page    Initial Assessment        See Epic Evaluation Start of Care Date: 03/08/23

## 2023-03-08 NOTE — INITIAL ASSESSMENTS
03/08/23 1400   General Information   Type of Visit Initial OP Ortho PT Evaluation   Start of Care Date 03/08/23   Referring Physician JOEY Page   Patient/Family Goals Statement walk, stairs, yard work with less knee pain   Orders Evaluate and Treat   Certification Required? Yes   Medical Diagnosis Primary osteoarthritis of left knee (M17.12)     Primary osteoarthritis of right knee (M17.11)   Surgical/Medical history reviewed Yes   Body Part(s)   Body Part(s) Knee   Presentation and Etiology   Pertinent history of current problem (include personal factors and/or comorbidities that impact the POC) Patient presents to physical therapy with bilateral knee pain in addition to that he has loss of movement stiffness loss of strength trouble with balance and trouble with walking.  Last week he had some fluid removed and Synvisc injections bilaterally.  He states that it has helped some.  Patient reports that in 1977 he had a tibial plateau fracture on his right leg and when he was 14 years old had a left tib-fib fracture.  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  He has not skied in the last 5 years.  But pain limits him with his tolerance to sitting walking work tasks and yard tasks and stairs.  Pain is rated between 0 and 4 out of 10 worse with activity and described as dull and aching.  He has not had any falls but he does feel has lack of some stability and weakness.  Pain is improved with walking changing positions and heating pads.  Lately in the last 5 years he has not done a whole lot of activities but watching his grandchildren play hockey and a little boating and fishing.  Not currently doing a home exercise program other than seated long arc quads occasionally.  He did try utilizing his [place] track stationary skiing device which he was able to tolerate 5 minutes but then when he tried 8 minutes it really flared up his knee pain.  He has most living things in his home on  single-level but laundry is in the basement.  He has troubles with stairs especially if there is no railing.   Onset date of current episode/exacerbation 03/02/23   Fall Risk Screen   Fall screen completed by PT   Have you fallen 2 or more times in the past year? No   Have you fallen and had an injury in the past year? No   Is patient a fall risk? No   Abuse Screen (yes response referral indicated)   Feels Unsafe at Home or Work/School no   Feels Threatened by Someone no   Does Anyone Try to Keep You From Having Contact with Others or Doing Things Outside Your Home? no   Physical Signs of Abuse Present no   Patient needs abuse support services and resources No   Knee Objective Findings   Palpation tight posterior knee muscles and capsule.   Observation Decreased quad volume bilaterally.  As well as decreased knee extension range of motion   Side (if bilateral, select both right and left) Right;Left   Right Knee Extension AROM Lacking 5 degrees   Left Knee Extension AROM Lacking 5 degrees   Right Knee Flexion AROM Able to achieve at least 115 degrees flexion   Left Knee Flexion AROM 115   Left Knee Flexion Strength 4+   Left Knee Extension Strength 4   Right Quad Set Strength 4   Right Hamstring Flexibility Poor   Left Hamstring Flexibility Poor   Planned Therapy Interventions   Planned Therapy Interventions balance training;gait training;joint mobilization;manual therapy;neuromuscular re-education;ROM;strengthening;stretching   Planned Modality Interventions   Planned Modality Interventions Cryotherapy;Hot packs;Ultrasound   Clinical Impression   Criteria for Skilled Therapeutic Interventions Met yes, treatment indicated   PT Diagnosis tight psoterior knee, OA of knees.   Influenced by the following impairments pain   Functional limitations due to impairments Walking and stairs   Clinical Presentation Stable/Uncomplicated   Clinical Presentation Rationale Chronic   Clinical Decision Making (Complexity) Moderate  complexity   Therapy Frequency 2 times/Week   Predicted Duration of Therapy Intervention (days/wks) 8 wks   Risk & Benefits of therapy have been explained Yes   Patient, Family & other staff in agreement with plan of care Yes   Clinical Impression Comments Physical therapy will help establish home exercise program working on range of motion and strength as well as improving balance to help reduce risk of falls and reduce his pain   ORTHO GOALS   PT Ortho Eval Goals 1;2;3   Ortho Goal 1   Goal Identifier Home exercise program   Goal Description Patient will maintain home exercise program at least 3 times a week to improve range of motion strength and improved balance.   Target Date 05/26/23   Ortho Goal 2   Goal Identifier Range of motion   Goal Description To improve Ext ROM  to near 0 for better walking ROM and stride.   Target Date 05/26/23   Ortho Goal 3   Goal Identifier stairs   Goal Description toerate stairs with and or without railing to better get around hockey mcgregor.   Target Date 05/26/23   Total Evaluation Time   PT Eval, Moderate Complexity Minutes (31074) 30   Therapy Certification   Certification date from 03/08/23   Certification date to 05/26/23   Medical Diagnosis Primary osteoarthritis of left knee (M17.12)     Primary osteoarthritis of right knee (M17.11)

## 2023-03-14 ENCOUNTER — HOSPITAL ENCOUNTER (OUTPATIENT)
Dept: PHYSICAL THERAPY | Facility: OTHER | Age: 88
Setting detail: THERAPIES SERIES
Discharge: HOME | End: 2023-03-14
Attending: FAMILY MEDICINE
Payer: MEDICARE

## 2023-03-14 PROCEDURE — 97140 MANUAL THERAPY 1/> REGIONS: CPT | Mod: GP | Performed by: PHYSICAL THERAPIST

## 2023-03-14 PROCEDURE — 97110 THERAPEUTIC EXERCISES: CPT | Mod: GP | Performed by: PHYSICAL THERAPIST

## 2023-03-16 ENCOUNTER — HOSPITAL ENCOUNTER (OUTPATIENT)
Dept: PHYSICAL THERAPY | Facility: OTHER | Age: 88
Setting detail: THERAPIES SERIES
Discharge: HOME | End: 2023-03-16
Attending: FAMILY MEDICINE
Payer: MEDICARE

## 2023-03-16 PROCEDURE — 97110 THERAPEUTIC EXERCISES: CPT | Mod: GP | Performed by: PHYSICAL THERAPIST

## 2023-03-16 PROCEDURE — 97140 MANUAL THERAPY 1/> REGIONS: CPT | Mod: GP | Performed by: PHYSICAL THERAPIST

## 2023-03-22 ENCOUNTER — HOSPITAL ENCOUNTER (OUTPATIENT)
Dept: PHYSICAL THERAPY | Facility: OTHER | Age: 88
Setting detail: THERAPIES SERIES
Discharge: HOME | End: 2023-03-22
Attending: FAMILY MEDICINE
Payer: MEDICARE

## 2023-03-22 PROCEDURE — 97140 MANUAL THERAPY 1/> REGIONS: CPT | Mod: GP | Performed by: PHYSICAL THERAPIST

## 2023-03-22 PROCEDURE — 97110 THERAPEUTIC EXERCISES: CPT | Mod: GP | Performed by: PHYSICAL THERAPIST

## 2023-03-22 PROCEDURE — 97112 NEUROMUSCULAR REEDUCATION: CPT | Mod: GP | Performed by: PHYSICAL THERAPIST

## 2023-03-24 ENCOUNTER — HOSPITAL ENCOUNTER (OUTPATIENT)
Dept: PHYSICAL THERAPY | Facility: OTHER | Age: 88
Setting detail: THERAPIES SERIES
Discharge: HOME | End: 2023-03-24
Attending: FAMILY MEDICINE
Payer: MEDICARE

## 2023-03-24 PROCEDURE — 97112 NEUROMUSCULAR REEDUCATION: CPT | Mod: GP | Performed by: PHYSICAL THERAPIST

## 2023-03-24 PROCEDURE — 97110 THERAPEUTIC EXERCISES: CPT | Mod: GP | Performed by: PHYSICAL THERAPIST

## 2023-03-24 PROCEDURE — 97140 MANUAL THERAPY 1/> REGIONS: CPT | Mod: GP | Performed by: PHYSICAL THERAPIST

## 2023-03-28 ENCOUNTER — HOSPITAL ENCOUNTER (OUTPATIENT)
Dept: PHYSICAL THERAPY | Facility: OTHER | Age: 88
Setting detail: THERAPIES SERIES
Discharge: HOME | End: 2023-03-28
Attending: FAMILY MEDICINE
Payer: MEDICARE

## 2023-03-28 PROCEDURE — 97110 THERAPEUTIC EXERCISES: CPT | Mod: GP | Performed by: PHYSICAL THERAPIST

## 2023-03-28 PROCEDURE — 97140 MANUAL THERAPY 1/> REGIONS: CPT | Mod: GP | Performed by: PHYSICAL THERAPIST

## 2023-03-30 ENCOUNTER — HOSPITAL ENCOUNTER (OUTPATIENT)
Dept: PHYSICAL THERAPY | Facility: OTHER | Age: 88
Setting detail: THERAPIES SERIES
Discharge: HOME | End: 2023-03-30
Attending: FAMILY MEDICINE
Payer: MEDICARE

## 2023-03-30 PROCEDURE — 97110 THERAPEUTIC EXERCISES: CPT | Mod: GP | Performed by: PHYSICAL THERAPIST

## 2023-03-30 PROCEDURE — 97140 MANUAL THERAPY 1/> REGIONS: CPT | Mod: GP | Performed by: PHYSICAL THERAPIST

## 2023-09-21 ENCOUNTER — OFFICE VISIT (OUTPATIENT)
Dept: FAMILY MEDICINE | Facility: OTHER | Age: 88
End: 2023-09-21
Attending: FAMILY MEDICINE
Payer: MEDICARE

## 2023-09-21 VITALS
DIASTOLIC BLOOD PRESSURE: 62 MMHG | RESPIRATION RATE: 16 BRPM | SYSTOLIC BLOOD PRESSURE: 118 MMHG | BODY MASS INDEX: 25.85 KG/M2 | OXYGEN SATURATION: 97 % | WEIGHT: 163.8 LBS | HEART RATE: 103 BPM | TEMPERATURE: 97.7 F

## 2023-09-21 DIAGNOSIS — M17.11 PRIMARY OSTEOARTHRITIS OF RIGHT KNEE: Primary | ICD-10-CM

## 2023-09-21 DIAGNOSIS — M17.12 PRIMARY OSTEOARTHRITIS OF LEFT KNEE: ICD-10-CM

## 2023-09-21 PROCEDURE — 250N000009 HC RX 250: Performed by: FAMILY MEDICINE

## 2023-09-21 PROCEDURE — 20611 DRAIN/INJ JOINT/BURSA W/US: CPT | Mod: 50 | Performed by: FAMILY MEDICINE

## 2023-09-21 PROCEDURE — 250N000011 HC RX IP 250 OP 636: Performed by: FAMILY MEDICINE

## 2023-09-21 PROCEDURE — G0463 HOSPITAL OUTPT CLINIC VISIT: HCPCS | Mod: 25

## 2023-09-21 RX ORDER — LIDOCAINE HYDROCHLORIDE 10 MG/ML
2 INJECTION, SOLUTION EPIDURAL; INFILTRATION; INTRACAUDAL; PERINEURAL ONCE
Status: COMPLETED | OUTPATIENT
Start: 2023-09-21 | End: 2023-09-21

## 2023-09-21 RX ADMIN — LIDOCAINE HYDROCHLORIDE 2 ML: 10 INJECTION, SOLUTION INFILTRATION; PERINEURAL at 16:00

## 2023-09-21 RX ADMIN — Medication 48 MG: at 15:59

## 2023-09-21 RX ADMIN — LIDOCAINE HYDROCHLORIDE 2 ML: 10 INJECTION, SOLUTION INFILTRATION; PERINEURAL at 15:59

## 2023-09-21 ASSESSMENT — PAIN SCALES - GENERAL: PAINLEVEL: MILD PAIN (3)

## 2023-09-21 NOTE — PROGRESS NOTES
Sports Medicine Office Note    HPI:  89-year-old male coming in for follow-up evaluation of bilateral knee pain.  He has had pain for multiple years, gradually worsening.  He was last seen in this office on 3/2 and received bilateral viscosupplementation injections.  He is unsure how effective the injections were.  He notices his pain at 3-6/10.  He characterizes the pain as dull, occasionally sharp.  He has undergone 6 sessions of physical therapy and has transition to home therapy which she reports he has not been as diligent as he would like during the summer months but plans to become more consistent as we rule him to fall.      EXAM:  /62 (BP Location: Right arm, Patient Position: Sitting, Cuff Size: Adult Regular)   Pulse 103   Temp 97.7  F (36.5  C) (Temporal)   Resp 16   Wt 74.3 kg (163 lb 12.8 oz)   SpO2 97%   BMI 25.85 kg/m    Musculoskeletal exam: Bilateral knees  - No gross deformity  - No bruising or soft tissue swelling  - Normal skin temperature without cyanosis      IMAGIN2023: 3 view left knee x-ray  - Moderate-severe medial tibiofemoral compartment joint space narrowing with indication of a previous osteochondral injury involving the medial femoral condyle.  Calcification posterior to the medial femoral condyle seen best on lateral view which could represent a joint body though more likely a soft tissue calcification.  Chondrocalcinosis noted.     2023: 4 view right knee x-ray  - Moderate-severe lateral tibiofemoral compartment joint space narrowing with associated osteophytosis.  Vascular calcifications noted posteriorly.  Moderate degenerative changes within the patellofemoral joint.  Chondrocalcinosis noted.  - Left knee shows similar findings to the x-ray seen on  without acute bony abnormality      ASSESSMENT/PLAN:  Diagnoses and all orders for this visit:  Primary osteoarthritis of right knee  -     PA ARTHROCENTESIS ASPIR&/INJ MAJOR JT/BURSA W/US  -     talya KELLY  20 (SYNVISC) injection 48 mg  -     POC US SOFT TISSUE  -     lidocaine (PF) (XYLOCAINE) 1 % injection 2 mL  Primary osteoarthritis of left knee  -     POC US SOFT TISSUE  -     hylan G-F 20 (SYNVISC) injection 48 mg  -     lidocaine (PF) (XYLOCAINE) 1 % injection 2 mL  -     ND ARTHROCENTESIS ASPIR&/INJ MAJOR JT/BURSA W/US    89-year-old male with bilateral knee osteoarthritis.  X-rays from January and February 2023 were both personally reviewed in the office with the findings as demonstrated above by my interpretation.  After reviewing the risks/benefits, the patient elects to proceed with bilateral viscosupplementation injections.  See procedure notes below:    PROCEDURE:  Ultrasound Guided Injection of the Bilateral Intraarticular Knees with Synvisc One      EQUIPMENT:  Michelle [employer] 70 with Linear eL18-4 (2-22MHz) Transducer.      PROCEDURAL PAUSE:    A procedural pause was conducted to verify:  correct patient identity, procedure to be performed, and as applicable, correct side, site, and correct patient position.      INFORMED CONSENT:   I discussed the risks, possible benefits, and alternatives to injection.  Following denial of allergy and review of potential side effects and complications (including but not necessarily limited to infection, allergic reaction, fat necrosis, skin depigmentation, local tissue breakdown, and injury to soft tissue and/or nerves), all questions were answered, and consent was given to proceed.  Patient verbalized understanding.      PROCEDURE DETAILS:    The use of direct ultrasound visualization of the needle (rather than a non-guided ultrasound procedure) was required to increase patient safety by excluding inadvertent intramuscular or intratendinous placement and minimizing bleeding and injury by avoiding osteochondral and nearby neurovascular structures.  Guidance also maximizes accurate injection placement and likely clinical benefit beyond that obtained with a non-guided  injection.  This allows increased diagnostic specificity when evaluating effectiveness of the injection.      Prior to the procedure, the left knee was examined with a 12MHz linear transducer to determine the optimal needle path and visualize the quadriceps tendon superficial to the suprapatellar recess in longitudinal and transverse views.  Following this, the skin was marked, and the left knee was prepared with chlorhexidine.  Local anesthesia was obtained with 2mL of 1% lidocaine.  Then this area was re-examined using the same transducer, a sterile ultrasound transducer cover, sterile gloves, and sterile gel.  Under ultrasound guidance, a 2-inch 21-gauge needle was advanced from lateral to medial using an in-plane approach into the suprapatellar recess of the knee joint space.  One needle pass was performed.  After ultrasound visualization of the needle tip in the target area and negative aspiration for blood, 6mL of Synvisc One (8mg/mL) was injected into the left suprapatellar space.  0mL was wasted.  Images have been saved on the musculoskeletal ultrasound in clinic.      Prior to the procedure, the right knee was examined with a 12MHz linear transducer to determine the optimal needle path and visualize the quadriceps tendon superficial to the suprapatellar recess in longitudinal and transverse views.  Following this, the skin was marked, and the right knee was prepared with chlorhexidine.  Local anesthesia was obtained with 2mL of 1% lidocaine.  Then this area was re-examined using the same transducer, a sterile ultrasound transducer cover, sterile gloves, and sterile gel.  Under ultrasound guidance, a 2-inch 21-gauge needle was advanced from lateral to medial using an in-plane approach into the suprapatellar recess of the knee joint space.  One needle pass was performed.  After ultrasound visualization of the needle tip in the target area and negative aspiration for blood, 6mL of Synvisc One (8mg/mL) was  injected into the right suprapatellar space.  0mL was wasted.  Images have been saved on the musculoskeletal ultrasound in clinic.      The patient tolerated the procedures without complication and was discharged in good condition after a short observation period.  The patient was instructed to contact me regarding any questions pertaining to the procedure.      DIAGNOSIS:    -Successful ultrasound guided Synvisc One injections of the bilateral intraarticular knees without immediate complication      PLAN:   -Post-procedure care reviewed, including avoiding submersion of the injection sites for 48 hours   -Return precautions reviewed for signs/symptoms that would be concerning for infection   -The patient was instructed to ice and take APAP this evening if needed   -Return to clinic as needed  - If symptoms are not improved at his desired with above injections, consider follow-up in approximately 6 weeks for CSI's      Davon Cochran MD  9/21/2023  2:00 PM    Total time spent with this patient was 32 minutes which included chart review, visualization and independent interpretation of images, time spent with the patient, and documentation.    Procedure time:  19 minute(s)

## 2023-10-10 ENCOUNTER — ALLIED HEALTH/NURSE VISIT (OUTPATIENT)
Dept: FAMILY MEDICINE | Facility: OTHER | Age: 88
End: 2023-10-10
Payer: MEDICARE

## 2023-10-10 DIAGNOSIS — Z23 NEED FOR PROPHYLACTIC VACCINATION AND INOCULATION AGAINST INFLUENZA: Primary | ICD-10-CM

## 2023-10-10 DIAGNOSIS — Z23 HIGH PRIORITY FOR 2019-NCOV VACCINE: ICD-10-CM

## 2023-10-10 PROCEDURE — G0008 ADMIN INFLUENZA VIRUS VAC: HCPCS

## 2023-10-10 PROCEDURE — 91320 SARSCV2 VAC 30MCG TRS-SUC IM: CPT

## 2024-02-05 DIAGNOSIS — R39.9 LOWER URINARY TRACT SYMPTOMS (LUTS): ICD-10-CM

## 2024-02-07 RX ORDER — TAMSULOSIN HYDROCHLORIDE 0.4 MG/1
0.4 CAPSULE ORAL DAILY
Qty: 90 CAPSULE | Refills: 3 | Status: SHIPPED | OUTPATIENT
Start: 2024-02-07 | End: 2024-04-09

## 2024-02-07 NOTE — TELEPHONE ENCOUNTER
[facility]rev[facility] sent Rx request for the following:      Requested Prescriptions   Pending Prescriptions Disp Refills    tamsulosin (FLOMAX) 0.4 MG capsule [Pharmacy Med Name: TAMSULOSIN 0.4MG CAPSULES] 90 capsule 3     Sig: TAKE 1 CAPSULE(0.4 MG) BY MOUTH DAILY       Alpha Blockers Failed - 2/7/2024  2:29 PM        Failed - Recent (12 mo) or future (30 days) visit within the authorizing provider's specialty     The patient must have completed an in-person or virtual visit within the past 12 months or has a future visit scheduled within the next 90 days with the authorizing provider s specialty.  Urgent care and e-visits do not quality as an office visit for this protocol.         Last Prescription Date:   1/11/23  Last Fill Qty/Refills:         90 cap, R-3    Last Office Visit:              1/11/23 Libby   Future Office visit:           4/9/24 Kaci      Unable to complete prescription refill per RN Medication Refill Policy.     January Méndez RN on 2/7/2024 at 2:34 PM

## 2024-02-27 DIAGNOSIS — I47.10 PAROXYSMAL SUPRAVENTRICULAR TACHYCARDIA (H): ICD-10-CM

## 2024-02-27 DIAGNOSIS — I10 ESSENTIAL HYPERTENSION: ICD-10-CM

## 2024-02-27 DIAGNOSIS — R39.9 LOWER URINARY TRACT SYMPTOMS (LUTS): ICD-10-CM

## 2024-02-28 RX ORDER — DILTIAZEM HYDROCHLORIDE 180 MG/1
180 CAPSULE, EXTENDED RELEASE ORAL DAILY
Qty: 90 CAPSULE | Refills: 0 | Status: SHIPPED | OUTPATIENT
Start: 2024-02-28 | End: 2024-04-09

## 2024-02-28 NOTE — TELEPHONE ENCOUNTER
Last Prescription Date: 1/11/2023  Last Qty/Refills: 90 / R-3  Last Office Visit: 1/11/2023  Future Office Visit: 4/09/2024     Requested Prescriptions   Pending Prescriptions Disp Refills    diltiazem ER (DILT-XR) 180 MG 24 hr capsule 90 capsule 3     Sig: Take 1 capsule (180 mg) by mouth daily       Calcium Channel Blockers Protocol  Failed - 2/28/2024  8:25 AM        Failed - Normal ALT in past 12 months     Recent Labs   Lab Test 01/11/23  1500 12/07/18  1140 11/20/17  1406   ALT 18   < >  --    GICHALT  --   --  19    < > = values in this interval not displayed.             Failed - Recent (12 mo) or future (30 days) visit within the authorizing provider's specialty     The patient must have completed an in-person or virtual visit within the past 12 months or has a future visit scheduled within the next 90 days with the authorizing provider s specialty.  Urgent care and e-visits do not quality as an office visit for this protocol.          Failed - Normal serum creatinine on file in past 12 months     Recent Labs   Lab Test 01/11/23  1500   CR 0.81       Ok to refill medication if creatinine is low       Routing to covering provider for refill consideration, as PCP/provider is out of clinic >48 hours or Pt is completely out of medication and provider is out of the clinic today.      Kendal Guerrero RN on 2/28/2024 at 8:28 AM

## 2024-03-07 RX ORDER — HYDROCHLOROTHIAZIDE 25 MG/1
25 TABLET ORAL DAILY
Qty: 90 TABLET | Refills: 0 | Status: SHIPPED | OUTPATIENT
Start: 2024-03-07 | End: 2024-04-09

## 2024-03-07 RX ORDER — FINASTERIDE 5 MG/1
5 TABLET, FILM COATED ORAL DAILY
Qty: 90 TABLET | Refills: 0 | Status: SHIPPED | OUTPATIENT
Start: 2024-03-07 | End: 2024-04-09

## 2024-03-07 NOTE — TELEPHONE ENCOUNTER
[facility] sent Rx request for the following:      Requested Prescriptions   Pending Prescriptions Disp Refills    finasteride (PROSCAR) 5 MG tablet 90 tablet 3     Sig: Take 1 tablet (5 mg) by mouth daily   Last Prescription Date:   1/11/23  Last Fill Qty/Refills:         90, R-3      BPH Agents Failed - 3/7/2024  1:12 PM        Failed - Recent (12 mo) or future (30 days) visit within the authorizing provider's department       hydrochlorothiazide (HYDRODIURIL) 25 MG tablet 90 tablet 3     Sig: Take 1 tablet (25 mg) by mouth daily   Last Prescription Date:   1/11/23  Last Fill Qty/Refills:         90, R-3      Diuretics (Including Combos) Protocol Failed - 3/7/2024  1:12 PM        Failed - Has GFR on file in past 12 months and most recent value is normal        Failed - Recent (12 mo) or future (90 days) visit within the authorizing provider's specialty     Last Office Visit:              1/11/23 (Libby)  Future Office visit:           4/9/24 (Kaci)    Unable to complete prescription refill per RN Medication Refill Policy.     Marivel Hubbard RN .............. 3/7/2024  1:18 PM

## 2024-04-02 ENCOUNTER — TELEPHONE (OUTPATIENT)
Dept: INTERNAL MEDICINE | Facility: OTHER | Age: 89
End: 2024-04-02
Payer: COMMERCIAL

## 2024-04-02 NOTE — TELEPHONE ENCOUNTER
Pt's been seeing a dermatologist every 6 months for follow up on skin cancer. States he saw that [facility] had something listing that he sees pt's for skin cancer screenings. Would like to know if he can just see [facility] for that or if he would need to keep seeing a dermatologist

## 2024-04-02 NOTE — TELEPHONE ENCOUNTER
Pt has a history of skin cancer in multiple sites and has at times needed MOHS surgery to remove the lesions.  I discussed with the pt that he should keep a dermatologist considering his history.  He will follow up with general med for basic skin checks but will also keep his dermatologist.  Cherie Wild LPN on 4/2/2024 at 9:48 AM

## 2024-04-09 ENCOUNTER — OFFICE VISIT (OUTPATIENT)
Dept: INTERNAL MEDICINE | Facility: OTHER | Age: 89
End: 2024-04-09
Attending: STUDENT IN AN ORGANIZED HEALTH CARE EDUCATION/TRAINING PROGRAM
Payer: COMMERCIAL

## 2024-04-09 VITALS
HEIGHT: 67 IN | HEART RATE: 119 BPM | OXYGEN SATURATION: 96 % | WEIGHT: 166 LBS | BODY MASS INDEX: 26.06 KG/M2 | TEMPERATURE: 97.7 F | RESPIRATION RATE: 19 BRPM | SYSTOLIC BLOOD PRESSURE: 128 MMHG | DIASTOLIC BLOOD PRESSURE: 70 MMHG

## 2024-04-09 DIAGNOSIS — I47.10 SVT (SUPRAVENTRICULAR TACHYCARDIA) (H): ICD-10-CM

## 2024-04-09 DIAGNOSIS — I47.10 PAROXYSMAL SUPRAVENTRICULAR TACHYCARDIA (H): ICD-10-CM

## 2024-04-09 DIAGNOSIS — R39.9 LOWER URINARY TRACT SYMPTOMS (LUTS): ICD-10-CM

## 2024-04-09 DIAGNOSIS — E78.2 MIXED HYPERLIPIDEMIA: ICD-10-CM

## 2024-04-09 DIAGNOSIS — R00.0 TACHYCARDIA: ICD-10-CM

## 2024-04-09 DIAGNOSIS — Z00.00 MEDICARE ANNUAL WELLNESS VISIT, SUBSEQUENT: Primary | ICD-10-CM

## 2024-04-09 DIAGNOSIS — I10 ESSENTIAL HYPERTENSION: ICD-10-CM

## 2024-04-09 LAB
ALBUMIN SERPL BCG-MCNC: 4.4 G/DL (ref 3.5–5.2)
ALP SERPL-CCNC: 86 U/L (ref 40–150)
ALT SERPL W P-5'-P-CCNC: 19 U/L (ref 0–70)
ANION GAP SERPL CALCULATED.3IONS-SCNC: 10 MMOL/L (ref 7–15)
AST SERPL W P-5'-P-CCNC: 21 U/L (ref 0–45)
BILIRUB SERPL-MCNC: 1 MG/DL
BUN SERPL-MCNC: 19.9 MG/DL (ref 8–23)
CALCIUM SERPL-MCNC: 9.7 MG/DL (ref 8.2–9.6)
CHLORIDE SERPL-SCNC: 101 MMOL/L (ref 98–107)
CHOLEST SERPL-MCNC: 109 MG/DL
CREAT SERPL-MCNC: 0.92 MG/DL (ref 0.67–1.17)
DEPRECATED HCO3 PLAS-SCNC: 28 MMOL/L (ref 22–29)
EGFRCR SERPLBLD CKD-EPI 2021: 79 ML/MIN/1.73M2
ERYTHROCYTE [DISTWIDTH] IN BLOOD BY AUTOMATED COUNT: 13.6 % (ref 10–15)
FASTING STATUS PATIENT QL REPORTED: NO
GLUCOSE SERPL-MCNC: 132 MG/DL (ref 70–99)
HCT VFR BLD AUTO: 35 % (ref 40–53)
HDLC SERPL-MCNC: 56 MG/DL
HGB BLD-MCNC: 12.7 G/DL (ref 13.3–17.7)
LDLC SERPL CALC-MCNC: 39 MG/DL
MAGNESIUM SERPL-MCNC: 1.9 MG/DL (ref 1.7–2.3)
MCH RBC QN AUTO: 33.1 PG (ref 26.5–33)
MCHC RBC AUTO-ENTMCNC: 36.3 G/DL (ref 31.5–36.5)
MCV RBC AUTO: 91 FL (ref 78–100)
NONHDLC SERPL-MCNC: 53 MG/DL
PLATELET # BLD AUTO: 123 10E3/UL (ref 150–450)
POTASSIUM SERPL-SCNC: 3.7 MMOL/L (ref 3.4–5.3)
PROT SERPL-MCNC: 7 G/DL (ref 6.4–8.3)
RBC # BLD AUTO: 3.84 10E6/UL (ref 4.4–5.9)
SODIUM SERPL-SCNC: 139 MMOL/L (ref 135–145)
TRIGL SERPL-MCNC: 70 MG/DL
WBC # BLD AUTO: 7.3 10E3/UL (ref 4–11)

## 2024-04-09 PROCEDURE — 99214 OFFICE O/P EST MOD 30 MIN: CPT | Mod: 25 | Performed by: STUDENT IN AN ORGANIZED HEALTH CARE EDUCATION/TRAINING PROGRAM

## 2024-04-09 PROCEDURE — 80053 COMPREHEN METABOLIC PANEL: CPT | Mod: ZL | Performed by: STUDENT IN AN ORGANIZED HEALTH CARE EDUCATION/TRAINING PROGRAM

## 2024-04-09 PROCEDURE — 36415 COLL VENOUS BLD VENIPUNCTURE: CPT | Mod: ZL | Performed by: STUDENT IN AN ORGANIZED HEALTH CARE EDUCATION/TRAINING PROGRAM

## 2024-04-09 PROCEDURE — 85027 COMPLETE CBC AUTOMATED: CPT | Mod: ZL | Performed by: STUDENT IN AN ORGANIZED HEALTH CARE EDUCATION/TRAINING PROGRAM

## 2024-04-09 PROCEDURE — 93010 ELECTROCARDIOGRAM REPORT: CPT | Performed by: INTERNAL MEDICINE

## 2024-04-09 PROCEDURE — 82465 ASSAY BLD/SERUM CHOLESTEROL: CPT | Mod: ZL | Performed by: STUDENT IN AN ORGANIZED HEALTH CARE EDUCATION/TRAINING PROGRAM

## 2024-04-09 PROCEDURE — G0463 HOSPITAL OUTPT CLINIC VISIT: HCPCS

## 2024-04-09 PROCEDURE — 93005 ELECTROCARDIOGRAM TRACING: CPT | Performed by: STUDENT IN AN ORGANIZED HEALTH CARE EDUCATION/TRAINING PROGRAM

## 2024-04-09 PROCEDURE — 83735 ASSAY OF MAGNESIUM: CPT | Mod: ZL | Performed by: STUDENT IN AN ORGANIZED HEALTH CARE EDUCATION/TRAINING PROGRAM

## 2024-04-09 PROCEDURE — G0439 PPPS, SUBSEQ VISIT: HCPCS | Performed by: STUDENT IN AN ORGANIZED HEALTH CARE EDUCATION/TRAINING PROGRAM

## 2024-04-09 RX ORDER — METOPROLOL SUCCINATE 50 MG/1
50 TABLET, EXTENDED RELEASE ORAL DAILY
Qty: 90 TABLET | Refills: 4 | Status: SHIPPED | OUTPATIENT
Start: 2024-04-09

## 2024-04-09 RX ORDER — TAMSULOSIN HYDROCHLORIDE 0.4 MG/1
0.8 CAPSULE ORAL DAILY
Qty: 90 CAPSULE | Refills: 3 | Status: SHIPPED | OUTPATIENT
Start: 2024-04-09 | End: 2024-04-15

## 2024-04-09 RX ORDER — DILTIAZEM HYDROCHLORIDE 180 MG/1
180 CAPSULE, EXTENDED RELEASE ORAL DAILY
Qty: 90 CAPSULE | Refills: 4 | Status: SHIPPED | OUTPATIENT
Start: 2024-04-09

## 2024-04-09 RX ORDER — FINASTERIDE 5 MG/1
5 TABLET, FILM COATED ORAL DAILY
Qty: 90 TABLET | Refills: 4 | Status: SHIPPED | OUTPATIENT
Start: 2024-04-09

## 2024-04-09 RX ORDER — HYDROCHLOROTHIAZIDE 25 MG/1
25 TABLET ORAL DAILY
Qty: 90 TABLET | Refills: 4 | Status: SHIPPED | OUTPATIENT
Start: 2024-04-09

## 2024-04-09 SDOH — HEALTH STABILITY: PHYSICAL HEALTH: ON AVERAGE, HOW MANY MINUTES DO YOU ENGAGE IN EXERCISE AT THIS LEVEL?: 20 MIN

## 2024-04-09 SDOH — HEALTH STABILITY: PHYSICAL HEALTH: ON AVERAGE, HOW MANY DAYS PER WEEK DO YOU ENGAGE IN MODERATE TO STRENUOUS EXERCISE (LIKE A BRISK WALK)?: 1 DAY

## 2024-04-09 ASSESSMENT — SOCIAL DETERMINANTS OF HEALTH (SDOH): HOW OFTEN DO YOU GET TOGETHER WITH FRIENDS OR RELATIVES?: MORE THAN THREE TIMES A WEEK

## 2024-04-09 ASSESSMENT — PAIN SCALES - GENERAL: PAINLEVEL: MILD PAIN (2)

## 2024-04-09 NOTE — LETTER
April 11, 2024      Jeffrey Cespedes  [address] [facility] [address]        Dear ,    We are writing to inform you of your test results.    Your laboratory results are below.  Your cholesterol is under excellent control.  Your blood counts are essentially normal.  Your kidney function electrolytes are good.    Resulted Orders   Lipid Profile   Result Value Ref Range    Cholesterol 109 <200 mg/dL    Triglycerides 70 <150 mg/dL    Direct Measure HDL 56 >=40 mg/dL    LDL Cholesterol Calculated 39 <=100 mg/dL    Non HDL Cholesterol 53 <130 mg/dL    Patient Fasting > 8hrs? No     Narrative    Cholesterol  Desirable:  <200 mg/dL    Triglycerides  Normal:  Less than 150 mg/dL  Borderline High:  150-199 mg/dL  High:  200-499 mg/dL  Very High:  Greater than or equal to 500 mg/dL    Direct Measure HDL  Female:  Greater than or equal to 50 mg/dL   Male:  Greater than or equal to 40 mg/dL    LDL Cholesterol  Desirable:  <100mg/dL  Above Desirable:  100-129 mg/dL   Borderline High:  130-159 mg/dL   High:  160-189 mg/dL   Very High:  >= 190 mg/dL    Non HDL Cholesterol  Desirable:  130 mg/dL  Above Desirable:  130-159 mg/dL  Borderline High:  160-189 mg/dL  High:  190-219 mg/dL  Very High:  Greater than or equal to 220 mg/dL   CBC with platelets   Result Value Ref Range    WBC Count 7.3 4.0 - 11.0 10e3/uL    RBC Count 3.84 (L) 4.40 - 5.90 10e6/uL    Hemoglobin 12.7 (L) 13.3 - 17.7 g/dL    Hematocrit 35.0 (L) 40.0 - 53.0 %    MCV 91 78 - 100 fL    MCH 33.1 (H) 26.5 - 33.0 pg    MCHC 36.3 31.5 - 36.5 g/dL    RDW 13.6 10.0 - 15.0 %    Platelet Count 123 (L) 150 - 450 10e3/uL   Comprehensive metabolic panel   Result Value Ref Range    Sodium 139 135 - 145 mmol/L      Comment:      Reference intervals for this test were updated on 09/26/2023 to more accurately reflect our healthy population. There may be differences in the flagging of prior results with similar values performed with this method. Interpretation of  those prior results can be made in the context of the updated reference intervals.     Potassium 3.7 3.4 - 5.3 mmol/L    Carbon Dioxide (CO2) 28 22 - 29 mmol/L    Anion Gap 10 7 - 15 mmol/L    Urea Nitrogen 19.9 8.0 - 23.0 mg/dL    Creatinine 0.92 0.67 - 1.17 mg/dL    GFR Estimate 79 >60 mL/min/1.73m2    Calcium 9.7 (H) 8.2 - 9.6 mg/dL    Chloride 101 98 - 107 mmol/L    Glucose 132 (H) 70 - 99 mg/dL    Alkaline Phosphatase 86 40 - 150 U/L      Comment:      Reference intervals for this test were updated on 11/14/2023 to more accurately reflect our healthy population. There may be differences in the flagging of prior results with similar values performed with this method. Interpretation of those prior results can be made in the context of the updated reference intervals.    AST 21 0 - 45 U/L      Comment:      Reference intervals for this test were updated on 6/12/2023 to more accurately reflect our healthy population. There may be differences in the flagging of prior results with similar values performed with this method. Interpretation of those prior results can be made in the context of the updated reference intervals.    ALT 19 0 - 70 U/L      Comment:      Reference intervals for this test were updated on 6/12/2023 to more accurately reflect our healthy population. There may be differences in the flagging of prior results with similar values performed with this method. Interpretation of those prior results can be made in the context of the updated reference intervals.      Protein Total 7.0 6.4 - 8.3 g/dL    Albumin 4.4 3.5 - 5.2 g/dL    Bilirubin Total 1.0 <=1.2 mg/dL   Magnesium   Result Value Ref Range    Magnesium 1.9 1.7 - 2.3 mg/dL       If you have any questions or concerns, please call the clinic at the number listed above.       Sincerely,      Luis Clemons MD

## 2024-04-09 NOTE — PROGRESS NOTES
"Chief Complaint   Patient presents with    Medicare Visit    Establish Care       Initial /70   Pulse 119   Temp 97.7  F (36.5  C)   Resp 19   Ht 1.702 m (5' 7\")   Wt 75.3 kg (166 lb)   SpO2 96%   BMI 26.00 kg/m   Estimated body mass index is 26 kg/m  as calculated from the following:    Height as of this encounter: 1.702 m (5' 7\").    Weight as of this encounter: 75.3 kg (166 lb).  Medication Review: complete    The next two questions are to help us understand your food security.  If you are feeling you need any assistance in this area, we have resources available to support you today.          4/9/2024   SDOH- Food Insecurity   Within the past 12 months, did you worry that your food would run out before you got money to buy more? N   Within the past 12 months, did the food you bought just not last and you didn t have money to get more? N         Health Care Directive:  Patient has a Health Care Directive on file      Anai Freire MA      "
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
## 2024-04-10 DIAGNOSIS — R39.9 LOWER URINARY TRACT SYMPTOMS (LUTS): ICD-10-CM

## 2024-04-10 LAB
ATRIAL RATE - MUSE: 109 BPM
DIASTOLIC BLOOD PRESSURE - MUSE: NORMAL MMHG
INTERPRETATION ECG - MUSE: NORMAL
P AXIS - MUSE: 4 DEGREES
PR INTERVAL - MUSE: 134 MS
QRS DURATION - MUSE: 104 MS
QT - MUSE: 336 MS
QTC - MUSE: 452 MS
R AXIS - MUSE: -54 DEGREES
SYSTOLIC BLOOD PRESSURE - MUSE: NORMAL MMHG
T AXIS - MUSE: 36 DEGREES
VENTRICULAR RATE- MUSE: 109 BPM

## 2024-04-11 ENCOUNTER — TRANSFERRED RECORDS (OUTPATIENT)
Dept: MULTI SPECIALTY CLINIC | Facility: CLINIC | Age: 89
End: 2024-04-11

## 2024-04-11 LAB — RETINOPATHY: NORMAL

## 2024-04-15 RX ORDER — TAMSULOSIN HYDROCHLORIDE 0.4 MG/1
0.8 CAPSULE ORAL DAILY
Qty: 180 CAPSULE | Refills: 4 | Status: SHIPPED | OUTPATIENT
Start: 2024-04-15

## 2024-04-15 NOTE — TELEPHONE ENCOUNTER
[facility] sent Rx request for the following:      Pt is requesting 90 day supply.     Disp Refills Start End LAMAR   tamsulosin (FLOMAX) 0.4 MG capsule 90 capsule 3 4/9/2024 -- No   Sig - Route: Take 2 capsules (0.8 mg) by mouth daily - Oral     Marivel Hubbard RN .............. 4/15/2024  3:54 PM

## 2024-04-16 ENCOUNTER — TELEPHONE (OUTPATIENT)
Dept: FAMILY MEDICINE | Facility: OTHER | Age: 89
End: 2024-04-16
Payer: COMMERCIAL

## 2024-04-16 NOTE — TELEPHONE ENCOUNTER
Patient has talked to his pcp about the lubrication injections and not working quite so well- he told patient to discuss any other procedure with you to see what next step is- please call patient

## 2024-04-17 NOTE — TELEPHONE ENCOUNTER
Please call this patient and let him know that we can try corticosteroid injections (cortisone).  He can make an appointment at his convenience.  We can also use that visit to discuss other options.  Thanks.

## 2024-04-17 NOTE — TELEPHONE ENCOUNTER
Patient states that he could not tell a difference after last Synvisc injection 9/21/23.  Dr Clemons told him that there was another injection, (does not recall what it was)  Writer encouraged him to continue to use Voltaren gel.  He was wondering if he should get another shot

## 2024-04-18 NOTE — TELEPHONE ENCOUNTER
Patient notified of message below and transferred to scheduling   Cyn Fuchs LPN.......4/18/2024 9:30 AM

## 2024-04-29 ENCOUNTER — OFFICE VISIT (OUTPATIENT)
Dept: FAMILY MEDICINE | Facility: OTHER | Age: 89
End: 2024-04-29
Attending: FAMILY MEDICINE
Payer: MEDICARE

## 2024-04-29 VITALS
BODY MASS INDEX: 26.47 KG/M2 | OXYGEN SATURATION: 96 % | TEMPERATURE: 97.3 F | HEART RATE: 69 BPM | SYSTOLIC BLOOD PRESSURE: 136 MMHG | WEIGHT: 169 LBS | DIASTOLIC BLOOD PRESSURE: 78 MMHG | RESPIRATION RATE: 16 BRPM

## 2024-04-29 DIAGNOSIS — M17.11 PRIMARY OSTEOARTHRITIS OF RIGHT KNEE: Primary | ICD-10-CM

## 2024-04-29 DIAGNOSIS — M17.12 PRIMARY OSTEOARTHRITIS OF LEFT KNEE: ICD-10-CM

## 2024-04-29 PROCEDURE — 250N000009 HC RX 250

## 2024-04-29 PROCEDURE — 250N000011 HC RX IP 250 OP 636

## 2024-04-29 PROCEDURE — 20610 DRAIN/INJ JOINT/BURSA W/O US: CPT | Performed by: FAMILY MEDICINE

## 2024-04-29 PROCEDURE — 99213 OFFICE O/P EST LOW 20 MIN: CPT | Mod: 25 | Performed by: FAMILY MEDICINE

## 2024-04-29 PROCEDURE — G0463 HOSPITAL OUTPT CLINIC VISIT: HCPCS | Mod: 25

## 2024-04-29 RX ORDER — TRIAMCINOLONE ACETONIDE 40 MG/ML
40 INJECTION, SUSPENSION INTRA-ARTICULAR; INTRAMUSCULAR ONCE
Status: COMPLETED | OUTPATIENT
Start: 2024-04-29 | End: 2024-04-29

## 2024-04-29 RX ORDER — BUPIVACAINE HYDROCHLORIDE 5 MG/ML
2 INJECTION, SOLUTION EPIDURAL; INTRACAUDAL ONCE
Status: COMPLETED | OUTPATIENT
Start: 2024-04-29 | End: 2024-04-29

## 2024-04-29 RX ORDER — LIDOCAINE HYDROCHLORIDE 10 MG/ML
2 INJECTION, SOLUTION EPIDURAL; INFILTRATION; INTRACAUDAL; PERINEURAL ONCE
Status: COMPLETED | OUTPATIENT
Start: 2024-04-29 | End: 2024-04-29

## 2024-04-29 RX ADMIN — BUPIVACAINE HYDROCHLORIDE 10 MG: 5 INJECTION, SOLUTION EPIDURAL; INTRACAUDAL; PERINEURAL at 13:34

## 2024-04-29 RX ADMIN — TRIAMCINOLONE ACETONIDE 40 MG: 40 INJECTION, SUSPENSION INTRA-ARTICULAR; INTRAMUSCULAR at 13:35

## 2024-04-29 RX ADMIN — LIDOCAINE HYDROCHLORIDE 2 ML: 10 INJECTION, SOLUTION INFILTRATION; PERINEURAL at 13:35

## 2024-04-29 ASSESSMENT — PAIN SCALES - GENERAL: PAINLEVEL: MILD PAIN (3)

## 2024-04-29 NOTE — PROGRESS NOTES
Sports Medicine Office Note    HPI:  89-year-old male coming in for follow-up evaluation of bilateral knee pain.  He has had pain for multiple years, stable within the last year or so.  He rates his pain at 3/10.  He characterizes the pain as dull.  He is more concerned today about instability rather than pain.  He is unsure if his previous injections have helped though he also questions on whether or not he would have declined further had it not been for these injections.  He has been using Voltaren to help with his symptoms.  He underwent 7 sessions of physical therapy in late winter 2023.  He continues to do 4-5 home exercises from this course of physical therapy.      EXAM:  /78 (BP Location: Right arm, Patient Position: Sitting, Cuff Size: Adult Regular)   Pulse 69   Temp 97.3  F (36.3  C) (Temporal)   Resp 16   Wt 76.7 kg (169 lb)   SpO2 96%   BMI 26.47 kg/m    Musculoskeletal exam: Bilateral knees  - No gross deformity  - No bruising or soft tissue swelling  - Normal skin temperature without cyanosis  - Mild joint line tenderness to the lateral joint line of the right knee  - Mild joint line tenderness to the medial joint line of the left knee      IMAGIN2023: 3 view left knee x-ray  - Moderate-severe medial tibiofemoral compartment joint space narrowing with indication of a previous osteochondral injury involving the medial femoral condyle.  Calcification posterior to the medial femoral condyle seen best on lateral view which could represent a joint body though more likely a soft tissue calcification.  Chondrocalcinosis noted.     2023: 4 view right knee x-ray  - Moderate-severe lateral tibiofemoral compartment joint space narrowing with associated osteophytosis.  Vascular calcifications noted posteriorly.  Moderate degenerative changes within the patellofemoral joint.  Chondrocalcinosis noted.  - Left knee shows similar findings to the x-ray seen on  without acute bony  abnormality      ASSESSMENT/PLAN:  Diagnoses and all orders for this visit:  Primary osteoarthritis of right knee  -     DRAIN/INJECT LARGE JOINT/BURSA  -     triamcinolone (KENALOG-40) injection 40 mg  -     lidocaine (PF) (XYLOCAINE) 1 % injection 2 mL  -     Bupivacaine 0.5% 2mL Intra-articular  Primary osteoarthritis of left knee  -     DRAIN/INJECT LARGE JOINT/BURSA  -     triamcinolone (KENALOG-40) injection 40 mg  -     lidocaine (PF) (XYLOCAINE) 1 % injection 2 mL  -     Bupivacaine 0.5% 2mL Intra-articular    89-year-old male with bilateral knee osteoarthritis.  X-rays from January and February 2023 were both personally reviewed in the office with findings as demonstrated above by my interpretation.  In addition to arthritis, I do feel that that some of his symptoms are due to physical deconditioning.  Continue strengthening with home therapy exercises as well as a focused strengthening course through a gym may be beneficial.  After reviewing the risks/benefits, the patient elects to proceed with bilateral intra-articular CSI's.  See procedure notes below:    PROCEDURE:  Intraarticular Injection of the Bilateral Knees     PROCEDURAL PAUSE:    A procedural pause was conducted to verify:  correct patient identity, procedure to be performed, and as applicable, correct side, site, and correct patient position.      INFORMED CONSENT:   I discussed the risks, possible benefits, and alternatives to injection.  Following denial of allergy and review of potential side effects and complications (including but not necessarily limited to infection, allergic reaction, fat necrosis, skin depigmentation, local tissue breakdown, systemic effects of corticosteroids, elevation of blood glucose, injury to soft tissue and/or nerves, and seizure), all questions were answered, and consent was given to proceed.  Patient verbalized understanding.      PROCEDURE DETAILS:    Side and site were marked, and a time out was performed to  verify appropriate patient identifiers.  Following this the right knee, just superior to the tibial plateau and medial to the patellar tendon, was prepped with chlorhexidine.  Utilizing a 22-gauge needle the right intraarticular knee was injected using a anteromedial to posterolateral approach with 2mL of 1% Lidocaine, 2mL of 0.5% bupivacaine, and 1mL triamcinolone (40mg/mL).  0mL was wasted.    Side and site were marked, and a time out was performed to verify appropriate patient identifiers.  Following this the left knee, just superior to the tibial plateau and medial to the patellar tendon, was prepped with chlorhexidine.  Utilizing a 22-gauge needle the left intraarticular knee was injected using a anteromedial to posterolateral approach with 2mL of 1% Lidocaine, 2mL of 0.5% bupivacaine, and 1mL triamcinolone (40mg/mL).  0mL was wasted.      The patient tolerated the procedures without complication and was discharged in good condition after a short observation period.  The patient was instructed to contact me regarding any questions pertaining to the procedure.      DIAGNOSIS:    -Successful injection of the bilateral intraarticular knees without immediate complication      PLAN:   -Post-procedure care reviewed, including avoiding submersion of the injection site for 48 hours   -Return precautions reviewed for signs/symptoms that would be concerning for infection   -The patient was instructed to ice and take APAP this evening if needed  -Encouraged patient to look into formal strengthening classes through the gym such as the [employer]  -Continue home therapy exercises, consider reestablishing with therapy  -Encouraged more walking throughout the day, try to increase daily step count  -Return to clinic as needed      Davon Cochran MD  4/29/2024  1:05 PM    Total time spent with this patient was 28 minutes which included chart review, visualization and independent interpretation of images, time spent with the patient,  and documentation.    Procedure time:  4 minute(s)

## 2024-07-01 ENCOUNTER — OFFICE VISIT (OUTPATIENT)
Dept: INTERNAL MEDICINE | Facility: OTHER | Age: 89
End: 2024-07-01
Attending: INTERNAL MEDICINE
Payer: COMMERCIAL

## 2024-07-01 VITALS
OXYGEN SATURATION: 96 % | HEIGHT: 67 IN | WEIGHT: 150.13 LBS | SYSTOLIC BLOOD PRESSURE: 118 MMHG | DIASTOLIC BLOOD PRESSURE: 66 MMHG | HEART RATE: 84 BPM | BODY MASS INDEX: 23.56 KG/M2 | RESPIRATION RATE: 16 BRPM | TEMPERATURE: 98.5 F

## 2024-07-01 DIAGNOSIS — Z74.09 IMPAIRED FUNCTIONAL MOBILITY, BALANCE, GAIT, AND ENDURANCE: ICD-10-CM

## 2024-07-01 DIAGNOSIS — R35.1 BENIGN PROSTATIC HYPERPLASIA WITH NOCTURIA: ICD-10-CM

## 2024-07-01 DIAGNOSIS — N40.1 BENIGN PROSTATIC HYPERPLASIA WITH NOCTURIA: ICD-10-CM

## 2024-07-01 DIAGNOSIS — R63.4 UNEXPLAINED WEIGHT LOSS: Primary | ICD-10-CM

## 2024-07-01 DIAGNOSIS — R00.0 TACHYCARDIA: ICD-10-CM

## 2024-07-01 DIAGNOSIS — R26.89 BALANCE PROBLEMS: ICD-10-CM

## 2024-07-01 DIAGNOSIS — R68.2 DRY MOUTH: ICD-10-CM

## 2024-07-01 DIAGNOSIS — I47.10 SVT (SUPRAVENTRICULAR TACHYCARDIA) (H): ICD-10-CM

## 2024-07-01 PROCEDURE — G0463 HOSPITAL OUTPT CLINIC VISIT: HCPCS

## 2024-07-01 PROCEDURE — 99213 OFFICE O/P EST LOW 20 MIN: CPT | Performed by: STUDENT IN AN ORGANIZED HEALTH CARE EDUCATION/TRAINING PROGRAM

## 2024-07-01 PROCEDURE — G2211 COMPLEX E/M VISIT ADD ON: HCPCS | Performed by: STUDENT IN AN ORGANIZED HEALTH CARE EDUCATION/TRAINING PROGRAM

## 2024-07-01 ASSESSMENT — PAIN SCALES - GENERAL: PAINLEVEL: NO PAIN (0)

## 2024-07-01 NOTE — NURSING NOTE
"Chief Complaint   Patient presents with    RECHECK       Initial /66   Pulse 84   Temp 98.5  F (36.9  C) (Temporal)   Resp 16   Ht 1.702 m (5' 7\")   Wt 68.1 kg (150 lb 2 oz)   SpO2 96%   BMI 23.51 kg/m   Estimated body mass index is 23.51 kg/m  as calculated from the following:    Height as of this encounter: 1.702 m (5' 7\").    Weight as of this encounter: 68.1 kg (150 lb 2 oz).  Medication Review: complete    The next two questions are to help us understand your food security.  If you are feeling you need any assistance in this area, we have resources available to support you today.          7/1/2024   SDOH- Food Insecurity   Within the past 12 months, did you worry that your food would run out before you got money to buy more? N   Within the past 12 months, did the food you bought just not last and you didn t have money to get more? N            Health Care Directive:  Patient has a Health Care Directive on file      Elmira Norton LPN      "

## 2024-07-01 NOTE — PROGRESS NOTES
Assessment & Plan         ICD-10-CM    1. Unexplained weight loss  R63.4       2. Benign prostatic hyperplasia with nocturia  N40.1     R35.1       3. Balance problems  R26.89       4. Tachycardia  R00.0       5. SVT (supraventricular tachycardia) (H24)  I47.10       6. Dry mouth  R68.2           Weight loss: Patient has incidentally lost approximately 15 pounds since our last visit.  He confirms that his valve is tighter.  He has somewhat limited appetite.  He will push calories to try and regain some of the weight and if unsuccessful/still declining weight in 1 month then we will perform imaging to look for possible malignancy or do additional workup.    SVT, sinus tachycardia: Heart rate well-controlled without palpitations with metoprolol.    Dry mouth: Unsure what is causing this.  He does not take any antihistamine.  Could be side effect of medication or age-related.    BPH: Only getting up 3-4 times at night to urinate.  He is on max dose finasteride and tamsulosin.  He is satisfied with decrease of his nocturia.    Follow-up with me in 1 month for weight recheck, sooner if needed.        No follow-ups on file.    Luis Clemons MD  [facility]   Jeffrey is a 89 year old, presenting for the following health issues:  RECHECK      7/1/2024     3:17 PM   Additional Questions   Roomed by HAFSA Ku   Accompanied by self     History of Present Illness       Vascular Disease:  He presents for follow up of vascular disease.     He never takes nitroglycerin. He takes daily aspirin.    He eats 2-3 servings of fruits and vegetables daily.He consumes 0 sweetened beverage(s) daily.He exercises with enough effort to increase his heart rate 10 to 19 minutes per day.  He exercises with enough effort to increase his heart rate 4 days per week. He is missing 1 dose(s) of medications per week.     He has lost 15 pounds since he was seen last.   No new symptoms otherwise.   No blood in  "stool or urine.   No abdominal pain  No fevers or chills  No diarrhea.     HR has decreased to 70s to 90s at rest.     Prostate: increased flomax to 0.8 seems to decrease urination, but still getting up 3-4 times per night. Also on finasteride.   This is improved and he is happy.     Balance is not what it was 6-8 months ago.   Had two trips and fel. Once over a dog, once on a step.   Balance is diminishing.   Gets a little orthostatic with getting up quick.     Knee shots seemed to help in April.    A little dry mouth and dry eyes.             Objective    /66   Pulse 84   Temp 98.5  F (36.9  C) (Temporal)   Resp 16   Ht 1.702 m (5' 7\")   Wt 68.1 kg (150 lb 2 oz)   SpO2 96%   BMI 23.51 kg/m    Body mass index is 23.51 kg/m .  Physical Exam   General: Pleasant 89-year-old man sitting clinic no acute distress        Wt Readings from Last 5 Encounters:   07/01/24 68.1 kg (150 lb 2 oz)   04/29/24 76.7 kg (169 lb)   04/09/24 75.3 kg (166 lb)   09/21/23 74.3 kg (163 lb 12.8 oz)   03/02/23 77.4 kg (170 lb 9.6 oz)           Signed Electronically by: Luis Clemons MD    "

## 2024-08-09 ENCOUNTER — THERAPY VISIT (OUTPATIENT)
Dept: PHYSICAL THERAPY | Facility: OTHER | Age: 89
End: 2024-08-09
Attending: STUDENT IN AN ORGANIZED HEALTH CARE EDUCATION/TRAINING PROGRAM
Payer: MEDICARE

## 2024-08-09 DIAGNOSIS — Z74.09 IMPAIRED FUNCTIONAL MOBILITY, BALANCE, GAIT, AND ENDURANCE: ICD-10-CM

## 2024-08-09 DIAGNOSIS — R26.89 BALANCE PROBLEMS: ICD-10-CM

## 2024-08-09 PROCEDURE — 97110 THERAPEUTIC EXERCISES: CPT | Mod: GP

## 2024-08-09 PROCEDURE — 97161 PT EVAL LOW COMPLEX 20 MIN: CPT | Mod: GP

## 2024-08-09 NOTE — PROGRESS NOTES
PHYSICAL THERAPY EVALUATION  Type of Visit: Evaluation       Fall Risk Screen:  Fall screen completed by: PT  Have you fallen 2 or more times in the past year?: Yes  Have you fallen and had an injury in the past year?: No  Is patient a fall risk?: Yes; Department fall risk interventions implemented    Subjective       Presenting condition or subjective complaint: Pt is a 89 year old male referred to skilled PT services following 2 falls (once a dog and once on a step) where he tripped over items. pt reports he is feeling like his knee is impacting his stability (has had 3 injections) but his knee is better now. Pt reports he does complete yard work but is very careful. reports an issue with blood pressure but meds control it. Pt reports he does get lightheaded with sit<>stands but takes his time moving around. pt reports he does complete HEP from Elie from PT in 2023. Pt reports he has 2 homes and does have stairs to his basement to complete laundry and has a really stable railing and goes down them backwards.  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    Calculus of kidney     1977, 1989,Reoccured    Enlarged prostate with lower urinary tract symptoms (LUTS)     No Comments Provided    Essential (primary) hypertension     No Comments Provided    Osteoarthritis     No Comments Provided    Personal history of other malignant neoplasm of skin (CODE)     2010    SVT (supraventricular tachycardia) (H24)     Zoster without complications     2009       Prior therapy history for the same diagnosis, illness or injury: Yes has had therapy for his knee in the past.    Prior Level of Function  Transfers: Independent  Ambulation: Independent  ADL: Independent  IADL: Driving, Finances, Housekeeping, Laundry, Meal preparation,  Medication management, Yard work    Living Environment  Social support: With a significant other or spouse   Type of home: House   Stairs to enter the home: No   Is there a railing: No     Ramp: No   Stairs inside the home: Yes 5 Is there a railing: Yes     Help at home: None  Equipment owned:       Employment: No retired  Hobbies/Interests: yard work, light work around the home.    Patient goals for therapy: Pt would like to be more stable    Pain assessment: Pain denied     Objective      Cognitive Status Examination  Orientation: Oriented to person, place and time   Level of Consciousness: Alert  Follows Commands and Answers Questions: 100% of the time  Personal Safety and Judgement: Intact  Memory: Intact    OBSERVATION: pt reporting a 15 pound weight loss over a short period of time  RANGE OF MOTION: LE ROM WFL  STRENGTH:  B hip flexion 3+/5, B hip ABD/ADD 4-/5, R knee flexion/ext 4/5, L knee flexion/ext 4-/5, R ankle dorsiflexion 4/5, L ankle dorsiflexion 3+/5      TRANSFERS: Independent      GAIT:   Level of [place]: Independent  Assistive Device(s): None  Gait Deviations: Base of support increased  Samantha decreased  Gait Distance: 50 feet      SPECIAL TESTS  Functional Gait Assessment (FGA) TOTAL SCORE: (MAXIMUM SCORE 30): 20    Christine Balance Scale (BBS) Total Score (out of 56): 46         Romberg  (sec) 30 sec       30 Second Sit to Stand (reps/height) 6 from 16 inch chair with arms extended         Assessment & Plan   CLINICAL IMPRESSIONS  Medical Diagnosis: balance problems, impaired functional mobility and endurance    Treatment Diagnosis: generalized weakness, gait deficits   Impression/Assessment: Patient is a 89 year old male with decreased strength, L ankle weakness, and dynamic balance deficits complaints.  The following significant findings have been identified: Impaired balance, Impaired gait, Impaired muscle performance, Decreased activity tolerance, and Instability. These impairments  interfere with their ability to perform work tasks, recreational activities, household mobility, and community mobility as compared to previous level of function.     Clinical Decision Making (Complexity):  Clinical Presentation: Stable/Uncomplicated  Clinical Presentation Rationale: based on medical and personal factors listed in PT evaluation  Clinical Decision Making (Complexity): Low complexity    PLAN OF CARE  Treatment Interventions:  Modalities: Cryotherapy, E-stim, Ultrasound, Vasoneumatic Device  Interventions: Gait Training, Manual Therapy, Neuromuscular Re-education, Therapeutic Activity, Therapeutic Exercise, Aquatic Therapy    Long Term Goals     PT Goal 1  Goal Identifier: static balance  Goal Description: Pt will demonstrate ability to complete 15 sec of B SLS without UE assist to increase stability for gait  Target Date: 09/20/24  PT Goal 2  Goal Identifier: dynamic balance  Goal Description: Pt will demonstrate ability to ambulate 5 feet of tandem walking without LOB to increase outdoor gait stability  Target Date: 10/04/24  PT Goal 3  Goal Identifier: strength  Goal Description: Pt will demosntrate ability to complete 8 sit<>stands from 16 inch chair with arms crossed over chest to increase LE strength for gait stability  Target Date: 10/18/24      Frequency of Treatment: 2x/week  Duration of Treatment: 10 weeks    Education Assessment:   Learner/Method: Patient    Risks and benefits of evaluation/treatment have been explained.   Patient/Family/caregiver agrees with Plan of Care.     Evaluation Time:     PT Eval, Low Complexity Minutes (11981): 30     Signing Clinician: Jyothi Monroe DPT        [facility]                                                                                   OUTPATIENT PHYSICAL THERAPY      PLAN OF TREATMENT FOR OUTPATIENT REHABILITATION   Patient's Last Name, First Name, Jeffrey Danielle YOB: 1932   Provider's Name    [facility]   Medical Record No.  [id number]     Onset Date: 08/09/24 (has been declining for a year)  Start of Care Date: 08/09/24     Medical Diagnosis:  balance problems, impaired functional mobility and endurance      PT Treatment Diagnosis:  generalized weakness, gait deficits Plan of Treatment  Frequency/Duration: 2x/week/ 10 weeks    Certification date from 08/09/24 to 10/18/24         See note for plan of treatment details and functional goals     Jyothi Monroe DPT                         I CERTIFY THE NEED FOR THESE SERVICES FURNISHED UNDER        THIS PLAN OF TREATMENT AND WHILE UNDER MY CARE     (Physician attestation of this document indicates review and certification of the therapy plan).              Referring Provider:  Luis Clemons    Initial Assessment  See Epic Evaluation- Start of Care Date: 08/09/24

## 2024-08-13 ENCOUNTER — THERAPY VISIT (OUTPATIENT)
Dept: PHYSICAL THERAPY | Facility: OTHER | Age: 89
End: 2024-08-13
Attending: STUDENT IN AN ORGANIZED HEALTH CARE EDUCATION/TRAINING PROGRAM
Payer: MEDICARE

## 2024-08-13 DIAGNOSIS — R26.89 BALANCE PROBLEMS: Primary | ICD-10-CM

## 2024-08-13 DIAGNOSIS — Z74.09 IMPAIRED FUNCTIONAL MOBILITY, BALANCE, GAIT, AND ENDURANCE: ICD-10-CM

## 2024-08-13 PROCEDURE — 97110 THERAPEUTIC EXERCISES: CPT | Mod: GP

## 2024-08-15 ENCOUNTER — THERAPY VISIT (OUTPATIENT)
Dept: PHYSICAL THERAPY | Facility: OTHER | Age: 89
End: 2024-08-15
Attending: STUDENT IN AN ORGANIZED HEALTH CARE EDUCATION/TRAINING PROGRAM
Payer: MEDICARE

## 2024-08-15 DIAGNOSIS — Z74.09 IMPAIRED FUNCTIONAL MOBILITY, BALANCE, GAIT, AND ENDURANCE: ICD-10-CM

## 2024-08-15 DIAGNOSIS — R26.89 BALANCE PROBLEMS: Primary | ICD-10-CM

## 2024-08-15 PROCEDURE — 97110 THERAPEUTIC EXERCISES: CPT | Mod: GP

## 2024-08-16 ENCOUNTER — TELEPHONE (OUTPATIENT)
Dept: INTERNAL MEDICINE | Facility: OTHER | Age: 89
End: 2024-08-16
Payer: COMMERCIAL

## 2024-08-16 DIAGNOSIS — R35.1 BENIGN PROSTATIC HYPERPLASIA WITH NOCTURIA: Primary | ICD-10-CM

## 2024-08-16 DIAGNOSIS — N40.1 BENIGN PROSTATIC HYPERPLASIA WITH NOCTURIA: Primary | ICD-10-CM

## 2024-08-16 NOTE — TELEPHONE ENCOUNTER
Patient states that he is down another 5 pounds from last visit is taking a protein/nutrition  supplement   States that he is now up 4 times a month, did increase Flomax as prescribed, wondering if a urologist would be a good idea   States that he does have an appt 8/26/24 and will discuss weight loss

## 2024-08-16 NOTE — TELEPHONE ENCOUNTER
Patient notified that referral has been placed, other items can be discussed at next appt  No further questions  Cyn Fuchs LPN.......8/16/2024 3:51 PM

## 2024-08-16 NOTE — TELEPHONE ENCOUNTER
Patient would like to speak with you about weight loss and prostate problems. Please call      Misa Koroma on 8/16/2024 at 2:38 PM

## 2024-08-19 ENCOUNTER — THERAPY VISIT (OUTPATIENT)
Dept: PHYSICAL THERAPY | Facility: OTHER | Age: 89
End: 2024-08-19
Attending: STUDENT IN AN ORGANIZED HEALTH CARE EDUCATION/TRAINING PROGRAM
Payer: MEDICARE

## 2024-08-19 DIAGNOSIS — R26.89 BALANCE PROBLEMS: Primary | ICD-10-CM

## 2024-08-19 DIAGNOSIS — Z74.09 IMPAIRED FUNCTIONAL MOBILITY, BALANCE, GAIT, AND ENDURANCE: ICD-10-CM

## 2024-08-19 PROCEDURE — 97112 NEUROMUSCULAR REEDUCATION: CPT | Mod: GP

## 2024-08-19 PROCEDURE — 97110 THERAPEUTIC EXERCISES: CPT | Mod: GP

## 2024-08-20 ENCOUNTER — TELEPHONE (OUTPATIENT)
Dept: INTERNAL MEDICINE | Facility: OTHER | Age: 89
End: 2024-08-20
Payer: COMMERCIAL

## 2024-08-20 NOTE — TELEPHONE ENCOUNTER
Patient has an appt on 8/26. He knows it will require some blood tests. He is wondering if he can have those done so [facility] will have the results for his appointment. Please call.    Joyce Quinn on 8/20/2024 at 1:10 PM

## 2024-08-20 NOTE — TELEPHONE ENCOUNTER
After verifying patient name and , writer notified patient that provider requests labs be done during patient office visit next week. Patient is agreeable and has no further questions or concerns at this time.     Kasey Matthew LPN on 2024 at 1:21 PM   Ext. [phone]

## 2024-08-26 ENCOUNTER — THERAPY VISIT (OUTPATIENT)
Dept: PHYSICAL THERAPY | Facility: OTHER | Age: 89
End: 2024-08-26
Attending: STUDENT IN AN ORGANIZED HEALTH CARE EDUCATION/TRAINING PROGRAM
Payer: MEDICARE

## 2024-08-26 ENCOUNTER — OFFICE VISIT (OUTPATIENT)
Dept: INTERNAL MEDICINE | Facility: OTHER | Age: 89
End: 2024-08-26
Attending: STUDENT IN AN ORGANIZED HEALTH CARE EDUCATION/TRAINING PROGRAM
Payer: MEDICARE

## 2024-08-26 VITALS
TEMPERATURE: 97 F | SYSTOLIC BLOOD PRESSURE: 122 MMHG | WEIGHT: 140 LBS | BODY MASS INDEX: 21.93 KG/M2 | RESPIRATION RATE: 16 BRPM | DIASTOLIC BLOOD PRESSURE: 60 MMHG | HEART RATE: 92 BPM | OXYGEN SATURATION: 97 %

## 2024-08-26 DIAGNOSIS — R73.9 ELEVATED RANDOM BLOOD GLUCOSE LEVEL: ICD-10-CM

## 2024-08-26 DIAGNOSIS — Z74.09 IMPAIRED FUNCTIONAL MOBILITY, BALANCE, GAIT, AND ENDURANCE: ICD-10-CM

## 2024-08-26 DIAGNOSIS — R63.4 UNEXPLAINED WEIGHT LOSS: Primary | ICD-10-CM

## 2024-08-26 DIAGNOSIS — E11.65 UNCONTROLLED TYPE 2 DIABETES MELLITUS WITH HYPERGLYCEMIA (H): ICD-10-CM

## 2024-08-26 DIAGNOSIS — Z12.5 ENCOUNTER FOR SCREENING FOR MALIGNANT NEOPLASM OF PROSTATE: ICD-10-CM

## 2024-08-26 DIAGNOSIS — R19.04 LLQ ABDOMINAL MASS: ICD-10-CM

## 2024-08-26 DIAGNOSIS — R26.89 BALANCE PROBLEMS: Primary | ICD-10-CM

## 2024-08-26 LAB
ALBUMIN SERPL BCG-MCNC: 4.5 G/DL (ref 3.5–5.2)
ALP SERPL-CCNC: 116 U/L (ref 40–150)
ALT SERPL W P-5'-P-CCNC: 25 U/L (ref 0–70)
ANION GAP SERPL CALCULATED.3IONS-SCNC: 13 MMOL/L (ref 7–15)
AST SERPL W P-5'-P-CCNC: 17 U/L (ref 0–45)
BASOPHILS # BLD AUTO: 0 10E3/UL (ref 0–0.2)
BASOPHILS NFR BLD AUTO: 0 %
BILIRUB SERPL-MCNC: 1.4 MG/DL
BUN SERPL-MCNC: 28.3 MG/DL (ref 8–23)
CALCIUM SERPL-MCNC: 10 MG/DL (ref 8.8–10.4)
CHLORIDE SERPL-SCNC: 96 MMOL/L (ref 98–107)
CREAT SERPL-MCNC: 0.74 MG/DL (ref 0.67–1.17)
CREAT UR-MCNC: 41.6 MG/DL
EGFRCR SERPLBLD CKD-EPI 2021: 85 ML/MIN/1.73M2
EOSINOPHIL # BLD AUTO: 0 10E3/UL (ref 0–0.7)
EOSINOPHIL NFR BLD AUTO: 0 %
ERYTHROCYTE [DISTWIDTH] IN BLOOD BY AUTOMATED COUNT: 13.2 % (ref 10–15)
GLUCOSE SERPL-MCNC: 516 MG/DL (ref 70–99)
HBA1C MFR BLD: 11.7 % (ref 4–6.2)
HCO3 SERPL-SCNC: 26 MMOL/L (ref 22–29)
HCT VFR BLD AUTO: 37.2 % (ref 40–53)
HGB BLD-MCNC: 13.9 G/DL (ref 13.3–17.7)
IMM GRANULOCYTES # BLD: 0.1 10E3/UL
IMM GRANULOCYTES NFR BLD: 1 %
LIPASE SERPL-CCNC: 22 U/L (ref 13–60)
LYMPHOCYTES # BLD AUTO: 1.3 10E3/UL (ref 0.8–5.3)
LYMPHOCYTES NFR BLD AUTO: 14 %
MCH RBC QN AUTO: 34 PG (ref 26.5–33)
MCHC RBC AUTO-ENTMCNC: 37.4 G/DL (ref 31.5–36.5)
MCV RBC AUTO: 91 FL (ref 78–100)
MICROALBUMIN UR-MCNC: 20.7 MG/L
MICROALBUMIN/CREAT UR: 49.76 MG/G CR (ref 0–17)
MONOCYTES # BLD AUTO: 0.8 10E3/UL (ref 0–1.3)
MONOCYTES NFR BLD AUTO: 8 %
NEUTROPHILS # BLD AUTO: 7.4 10E3/UL (ref 1.6–8.3)
NEUTROPHILS NFR BLD AUTO: 77 %
NRBC # BLD AUTO: 0 10E3/UL
NRBC BLD AUTO-RTO: 0 /100
PHOSPHATE SERPL-MCNC: 3.3 MG/DL (ref 2.5–4.5)
PLATELET # BLD AUTO: 111 10E3/UL (ref 150–450)
POTASSIUM SERPL-SCNC: 3.6 MMOL/L (ref 3.4–5.3)
PROT SERPL-MCNC: 7.6 G/DL (ref 6.4–8.3)
PSA SERPL DL<=0.01 NG/ML-MCNC: 4.77 NG/ML
RBC # BLD AUTO: 4.09 10E6/UL (ref 4.4–5.9)
SODIUM SERPL-SCNC: 135 MMOL/L (ref 135–145)
TSH SERPL DL<=0.005 MIU/L-ACNC: 2.53 UIU/ML (ref 0.3–4.2)
WBC # BLD AUTO: 9.7 10E3/UL (ref 4–11)

## 2024-08-26 PROCEDURE — 36415 COLL VENOUS BLD VENIPUNCTURE: CPT | Mod: ZL | Performed by: STUDENT IN AN ORGANIZED HEALTH CARE EDUCATION/TRAINING PROGRAM

## 2024-08-26 PROCEDURE — 82043 UR ALBUMIN QUANTITATIVE: CPT | Mod: ZL | Performed by: STUDENT IN AN ORGANIZED HEALTH CARE EDUCATION/TRAINING PROGRAM

## 2024-08-26 PROCEDURE — G0463 HOSPITAL OUTPT CLINIC VISIT: HCPCS

## 2024-08-26 PROCEDURE — 83690 ASSAY OF LIPASE: CPT | Mod: ZL | Performed by: STUDENT IN AN ORGANIZED HEALTH CARE EDUCATION/TRAINING PROGRAM

## 2024-08-26 PROCEDURE — 83036 HEMOGLOBIN GLYCOSYLATED A1C: CPT | Mod: ZL | Performed by: STUDENT IN AN ORGANIZED HEALTH CARE EDUCATION/TRAINING PROGRAM

## 2024-08-26 PROCEDURE — 84443 ASSAY THYROID STIM HORMONE: CPT | Mod: ZL | Performed by: STUDENT IN AN ORGANIZED HEALTH CARE EDUCATION/TRAINING PROGRAM

## 2024-08-26 PROCEDURE — 80053 COMPREHEN METABOLIC PANEL: CPT | Mod: ZL | Performed by: STUDENT IN AN ORGANIZED HEALTH CARE EDUCATION/TRAINING PROGRAM

## 2024-08-26 PROCEDURE — G0103 PSA SCREENING: HCPCS | Mod: ZL | Performed by: STUDENT IN AN ORGANIZED HEALTH CARE EDUCATION/TRAINING PROGRAM

## 2024-08-26 PROCEDURE — 84100 ASSAY OF PHOSPHORUS: CPT | Mod: ZL | Performed by: STUDENT IN AN ORGANIZED HEALTH CARE EDUCATION/TRAINING PROGRAM

## 2024-08-26 PROCEDURE — G2211 COMPLEX E/M VISIT ADD ON: HCPCS | Performed by: STUDENT IN AN ORGANIZED HEALTH CARE EDUCATION/TRAINING PROGRAM

## 2024-08-26 PROCEDURE — 97112 NEUROMUSCULAR REEDUCATION: CPT | Mod: GP

## 2024-08-26 PROCEDURE — 99214 OFFICE O/P EST MOD 30 MIN: CPT | Performed by: STUDENT IN AN ORGANIZED HEALTH CARE EDUCATION/TRAINING PROGRAM

## 2024-08-26 PROCEDURE — 97110 THERAPEUTIC EXERCISES: CPT | Mod: GP

## 2024-08-26 PROCEDURE — 85004 AUTOMATED DIFF WBC COUNT: CPT | Mod: ZL | Performed by: STUDENT IN AN ORGANIZED HEALTH CARE EDUCATION/TRAINING PROGRAM

## 2024-08-26 RX ORDER — LANCETS
EACH MISCELLANEOUS
Qty: 100 EACH | Refills: 6 | Status: SHIPPED | OUTPATIENT
Start: 2024-08-26

## 2024-08-26 ASSESSMENT — PATIENT HEALTH QUESTIONNAIRE - PHQ9
10. IF YOU CHECKED OFF ANY PROBLEMS, HOW DIFFICULT HAVE THESE PROBLEMS MADE IT FOR YOU TO DO YOUR WORK, TAKE CARE OF THINGS AT HOME, OR GET ALONG WITH OTHER PEOPLE: VERY DIFFICULT
SUM OF ALL RESPONSES TO PHQ QUESTIONS 1-9: 13
SUM OF ALL RESPONSES TO PHQ QUESTIONS 1-9: 13

## 2024-08-26 ASSESSMENT — PAIN SCALES - GENERAL: PAINLEVEL: NO PAIN (0)

## 2024-08-26 NOTE — PROGRESS NOTES
Answers submitted by the patient for this visit:  Patient Health Questionnaire (Submitted on 8/26/2024)  If you checked off any problems, how difficult have these problems made it for you to do your work, take care of things at home, or get along with other people?: Very difficult  PHQ9 TOTAL SCORE: 13    Assessment & Plan         ICD-10-CM    1. Unexplained weight loss  R63.4 Comprehensive Metabolic Panel     CBC and Differential     CT Chest/Abdomen/Pelvis w Contrast     Phosphorus     TSH Reflex GH     Lipase     Comprehensive Metabolic Panel     CBC and Differential     Phosphorus     TSH Reflex GH     Lipase      2. Encounter for screening for malignant neoplasm of prostate  Z12.5 PSA Screen GH     PSA Screen GH      3. Elevated random blood glucose level  R73.09 Hemoglobin A1c     Albumin Random Urine Quantitative with Creat Ratio     Hemoglobin A1c     Albumin Random Urine Quantitative with Creat Ratio        Unexplained weight loss: Patient has had nearly a 30 pound weight loss in the last 5 months.  Differential at this time includes diabetes,, hypothyroidism, malignancy, infection etc.  Most likely malignancy at his age.  Leading differential at this time would be a gastrointestinal malignancy with new constipation and possible mass in the left lower quadrant.  Will obtain basic screening labs for malignancy and order CT chest abdomen pelvis to try and find any primary.  He has tried extensively to gain weight over the last several months and has been unsuccessful.  Pending his laboratory results we will follow-up   To discuss additional workup or treatment.    The longitudinal plan of care for the diagnosis(es)/condition(s) as documented were addressed during this visit. Due to the added complexity in care, I will continue to support Jeffrey in the subsequent management and with ongoing continuity of care.\    No follow-ups on file.    Luis Clemons MD  [facility]      Addendum: Blood  sugar 516, hemoglobin A1c 11.7 he also does have worsening thrombocytopenia.  Concern for something with his liver dysfunction.  Surprisingly his lipase is completely normal.  Could just be uncontrolled diabetes but that it would have such a drastic change in blood sugar over the last 4 months.  Will initiate insulin glargine 12 units daily and he was given a prescription for a glucose monitor.  Likely need to add on mealtime insulin in the future.  Chose insulin over metformin, GLP-1's and SGLT2's due to his significant weight loss.  He will contact me if he is having any low blood sugars.  I would still like to have a CT scan especially with his abdominal findings and unexplained lab results.    Luis Clemons MD on 8/26/2024 at 5:11 PM      Tim Murphy is a 89 year old, presenting for the following health issues:  RECHECK (2 month follow up weight loss)        8/26/2024     3:40 PM   Additional Questions   Roomed by Ayah PEREYRA     History of Present Illness       Reason for visit:  Weight    He eats 2-3 servings of fruits and vegetables daily.He consumes 1 sweetened beverage(s) daily.He exercises with enough effort to increase his heart rate 20 to 29 minutes per day.  He exercises with enough effort to increase his heart rate 7 days per week.   He is taking medications regularly.       Wt Readings from Last 5 Encounters:   08/26/24 63.5 kg (140 lb)   07/01/24 68.1 kg (150 lb 2 oz)   04/29/24 76.7 kg (169 lb)   04/09/24 75.3 kg (166 lb)   09/21/23 74.3 kg (163 lb 12.8 oz)     Excessive thirst,   Up at night to sleep  Appetitie is goofy. Feels like everything in his mouth is dry.   Constantly thirsty.   A little sinus drainage for 3-4 years.     Breakfast has cereal  Lunch has half a sandwhich.   Dinner needs to be moist to be able to eat it.   Dry mouth.       Fatigued all the time  Hard stool.   Taking 42 gram protein drink.   Takes psilium to help with BM.   6 teaspoons daily.     No night sweats.      Urology appt for urinary concerns this October for nocturia and is maxed out on tamsulosin and proscar.     No new skin concerns. Sees dermatology every 6th months.   Basal cell and squamous in the past.           Objective    /60 (BP Location: Right arm, Patient Position: Sitting, Cuff Size: Adult Regular)   Pulse 92   Temp 97  F (36.1  C) (Temporal)   Resp 16   Wt 63.5 kg (140 lb)   SpO2 97%   BMI 21.93 kg/m    Body mass index is 21.93 kg/m .  Physical Exam  Vitals and nursing note reviewed.   Constitutional:       General: He is not in acute distress.     Appearance: He is well-developed. He is not diaphoretic.   HENT:      Head: Normocephalic and atraumatic.      Right Ear: Tympanic membrane, ear canal and external ear normal.      Left Ear: Tympanic membrane, ear canal and external ear normal.      Mouth/Throat:      Mouth: Mucous membranes are moist.      Pharynx: Oropharynx is clear.   Eyes:      General: No scleral icterus.     Conjunctiva/sclera: Conjunctivae normal.   Cardiovascular:      Rate and Rhythm: Normal rate and regular rhythm.      Heart sounds: No murmur heard.  Pulmonary:      Effort: Pulmonary effort is normal.      Breath sounds: Normal breath sounds. No wheezing.   Abdominal:      Palpations: Abdomen is soft. There is no mass.      Tenderness: There is no abdominal tenderness.      Comments: Approximately 5 cm palpable mass in the left lower quadrant into the pelvis.  Mobile firm, unable to differentiate if this is a colon full of stool or a mass.   Musculoskeletal:         General: No deformity.      Cervical back: Neck supple.   Lymphadenopathy:      Cervical: No cervical adenopathy.   Skin:     General: Skin is warm and dry.      Coloration: Skin is not jaundiced.      Findings: No rash.   Neurological:      Mental Status: He is alert and oriented to person, place, and time. Mental status is at baseline.   Psychiatric:         Mood and Affect: Mood normal.          Behavior: Behavior normal.         Thought Content: Thought content normal.                Signed Electronically by: Luis Clemons MD

## 2024-08-26 NOTE — NURSING NOTE
"Chief Complaint   Patient presents with    RECHECK     2 month follow up weight loss   Patient presents to the clinic today for a 2 month follow up for weight loss    Initial There were no vitals taken for this visit. Estimated body mass index is 23.51 kg/m  as calculated from the following:    Height as of 7/1/24: 1.702 m (5' 7\").    Weight as of 7/1/24: 68.1 kg (150 lb 2 oz).  Meds Reconciled: complete      Ayah Brown LPN,LPN on 8/26/2024 at 3:41 PM  Ext. [phone]        Ayah Brown LPN  "

## 2024-08-27 ENCOUNTER — TELEPHONE (OUTPATIENT)
Dept: INTERNAL MEDICINE | Facility: OTHER | Age: 89
End: 2024-08-27
Payer: COMMERCIAL

## 2024-08-27 DIAGNOSIS — E11.65 UNCONTROLLED TYPE 2 DIABETES MELLITUS WITH HYPERGLYCEMIA (H): Primary | ICD-10-CM

## 2024-08-27 NOTE — TELEPHONE ENCOUNTER
CMP/PSA/A1c/Lipids    No Cervical films unless progression of symptoms    No alarm signs with UE weakness and radicular s/s- nighttime only symptoms  Anticipatory guidance and plans for further follow up of symptom presentation       Reason for call: Medication or medication refill    Have you contacted your pharmacy regarding this refill? YES    If No, direct the patient to contact the Pharmacy and discontinue telephone note using Erroneous Encounter.  If Yes, continue.    Name of medication requested: pen needles    How many days of medication do you have left? NEW    What pharmacy do you use? [facility] - [place]    Preferred method for responding to this message: Telephone Call    Phone number patient can be reached at: Other phone number:  Trevor: [phone]    If we cannot reach you directly, may we leave a detailed response at the number you provided? No      Misha with [employer] stated they did not receive the pen needles to go along with the patients new insulin pen. Patient is looking to  prescription today.      Please ask for Misha is calling back.      Herminia Goncalves on 8/27/2024 at 9:29 AM

## 2024-08-27 NOTE — TELEPHONE ENCOUNTER
JASON Alberto NP -   Patient was newly prescribed a lantus pen Rx. Pharmacy needs an order for pen needles to go along with it.   Mackenzie Richard RN on 8/27/2024 at 9:53 AM

## 2024-08-28 ENCOUNTER — TELEPHONE (OUTPATIENT)
Dept: INTERNAL MEDICINE | Facility: OTHER | Age: 89
End: 2024-08-28

## 2024-08-28 ENCOUNTER — THERAPY VISIT (OUTPATIENT)
Dept: PHYSICAL THERAPY | Facility: OTHER | Age: 89
End: 2024-08-28
Attending: STUDENT IN AN ORGANIZED HEALTH CARE EDUCATION/TRAINING PROGRAM
Payer: MEDICARE

## 2024-08-28 DIAGNOSIS — Z74.09 IMPAIRED FUNCTIONAL MOBILITY, BALANCE, GAIT, AND ENDURANCE: ICD-10-CM

## 2024-08-28 DIAGNOSIS — Z91.041 CONTRAST MEDIA ALLERGY: Primary | ICD-10-CM

## 2024-08-28 DIAGNOSIS — R26.89 BALANCE PROBLEMS: Primary | ICD-10-CM

## 2024-08-28 PROCEDURE — 97110 THERAPEUTIC EXERCISES: CPT | Mod: GP

## 2024-08-28 NOTE — TELEPHONE ENCOUNTER
I have scheduled Jeffrey for the CT CHEST/ABDOMEN/PELVIS W on 9/4/24 @ 2:30 pm. He indicated in the screening questions that he had an adverse reaction of hives to the contrast dye years ago. I am notifying you so that the pre-treatment guidelines can be started. Please reach out to the patient. Thank you.     Charlene Macias on 8/28/2024 at 11:01 AM

## 2024-08-29 RX ORDER — METHYLPREDNISOLONE 32 MG/1
TABLET ORAL
Qty: 2 TABLET | Refills: 0 | Status: SHIPPED | OUTPATIENT
Start: 2024-08-29

## 2024-08-29 NOTE — TELEPHONE ENCOUNTER
After verifying patient name and , patient given the information below. Patient has no further questions or concerns at this time.     Kasey Matthew LPN on 2024 at 12:47 PM   Ext. [phone]

## 2024-08-29 NOTE — TELEPHONE ENCOUNTER
Order sent to [facility].     Luis Clemons MD on 8/29/2024 at 11:46 AM

## 2024-09-03 ENCOUNTER — THERAPY VISIT (OUTPATIENT)
Dept: PHYSICAL THERAPY | Facility: OTHER | Age: 89
End: 2024-09-03
Attending: STUDENT IN AN ORGANIZED HEALTH CARE EDUCATION/TRAINING PROGRAM
Payer: COMMERCIAL

## 2024-09-03 DIAGNOSIS — R26.89 BALANCE PROBLEMS: Primary | ICD-10-CM

## 2024-09-03 DIAGNOSIS — Z74.09 IMPAIRED FUNCTIONAL MOBILITY, BALANCE, GAIT, AND ENDURANCE: ICD-10-CM

## 2024-09-04 ENCOUNTER — HOSPITAL ENCOUNTER (OUTPATIENT)
Dept: CT IMAGING | Facility: OTHER | Age: 89
Discharge: HOME | End: 2024-09-04
Attending: STUDENT IN AN ORGANIZED HEALTH CARE EDUCATION/TRAINING PROGRAM | Admitting: STUDENT IN AN ORGANIZED HEALTH CARE EDUCATION/TRAINING PROGRAM
Payer: MEDICARE

## 2024-09-04 DIAGNOSIS — R19.04 LLQ ABDOMINAL MASS: ICD-10-CM

## 2024-09-04 DIAGNOSIS — R63.4 UNEXPLAINED WEIGHT LOSS: ICD-10-CM

## 2024-09-04 PROCEDURE — 250N000009 HC RX 250: Performed by: STUDENT IN AN ORGANIZED HEALTH CARE EDUCATION/TRAINING PROGRAM

## 2024-09-04 PROCEDURE — 71260 CT THORAX DX C+: CPT | Mod: MG

## 2024-09-04 PROCEDURE — 250N000011 HC RX IP 250 OP 636: Performed by: STUDENT IN AN ORGANIZED HEALTH CARE EDUCATION/TRAINING PROGRAM

## 2024-09-04 RX ORDER — IOPAMIDOL 755 MG/ML
81 INJECTION, SOLUTION INTRAVASCULAR ONCE
Status: COMPLETED | OUTPATIENT
Start: 2024-09-04 | End: 2024-09-04

## 2024-09-04 RX ADMIN — SODIUM CHLORIDE 60 ML: 9 INJECTION, SOLUTION INTRAVENOUS at 14:49

## 2024-09-04 RX ADMIN — IOPAMIDOL 81 ML: 755 INJECTION, SOLUTION INTRAVENOUS at 14:48

## 2024-09-04 NOTE — PROGRESS NOTES
1.  Has the patient had a previous reaction to IV contrast? No    2.  Does the patient have kidney disease? No    3.  Is the patient on dialysis? No    If YES to any of these questions, exam will be reviewed with a Radiologist before administering contrast.  IV Contrast- Discharge Instructions After Your CT Scan      The IV contrast you received today will be filtered from your bloodstream by your kidneys during the next 24 hours and pass from the body in urine.  You will not be aware of this process and your urine will not change in color.  To help this process you should drink at least 4 additional glasses of water or juice today.  This reduces stress on your kidneys.    Most contrast reactions are immediate.  Should you develop symptoms of concern after discharge, contact the department at the number below.  After hours you should contact your personal physician.  If you develop breathing distress or wheezing, call 911.

## 2024-09-04 NOTE — LETTER
September 4, 2024      Jeffrey Cespedes  [address] [facility] [address]        Dear ,    We are writing to inform you of your test results.    Your CT scan did not show any evidence of cancer.  This weight loss may just be due to diabetes as we discussed in on the phone.  I will see you in clinic next week for follow-up.  Resulted Orders   CT Chest/Abdomen/Pelvis w Contrast    Narrative    PROCEDURE: CT CHEST/ABDOMEN/PELVIS W CONTRAST 9/4/2024 3:01 PM    HISTORY: Unexplained weight loss; LLQ abdominal mass    COMPARISONS: None.    Meds/Dose Given: 81mL Isovue 370    TECHNIQUE: CT scan of the chest abdomen and pelvis with IV contrast  sagittal coronal reconstructions were obtained    FINDINGS: CT chest: There is some interstitial thickening and linear  scarring seen in both lungs predominantly in the lower lobes. The  hilar and mediastinal lymph nodes are normal. The heart is normal in  size. Thoracic aorta is normal. Axillary and supraclavicular lymph  nodes are normal. Advanced degenerative changes are present in the  thoracic spine. There is wedging of a midthoracic vertebral body which  appears old.    CT scan of the abdomen and pelvis: The liver is free of masses or  biliary duct enlargement. No calcified gallstones are seen. Spleen and  pancreas are normal. The adrenal glands are normal. There are benign  appearing cysts seen in both kidneys. The periaortic lymph nodes are  normal in caliber. No intraperitoneal masses or inflammatory changes  are noted. The bladder and rectum are normal. Scoliosis and advanced  degenerative changes are present in the lumbar spine.         Impression    IMPRESSION: Interstitial thickening and pulmonary scarring is seen in  both lower lobes.    No intraperitoneal masses or inflammatory changes.    JASMINE RODRIGUEZ MD         SYSTEM ID:  [id number]       If you have any questions or concerns, please call the clinic at the number listed above.        Sincerely,      Luis Clemons MD

## 2024-09-05 ENCOUNTER — THERAPY VISIT (OUTPATIENT)
Dept: PHYSICAL THERAPY | Facility: OTHER | Age: 89
End: 2024-09-05
Attending: STUDENT IN AN ORGANIZED HEALTH CARE EDUCATION/TRAINING PROGRAM
Payer: COMMERCIAL

## 2024-09-05 DIAGNOSIS — Z74.09 IMPAIRED FUNCTIONAL MOBILITY, BALANCE, GAIT, AND ENDURANCE: ICD-10-CM

## 2024-09-05 DIAGNOSIS — R26.89 BALANCE PROBLEMS: Primary | ICD-10-CM

## 2024-09-09 ENCOUNTER — OFFICE VISIT (OUTPATIENT)
Dept: INTERNAL MEDICINE | Facility: OTHER | Age: 89
End: 2024-09-09
Attending: STUDENT IN AN ORGANIZED HEALTH CARE EDUCATION/TRAINING PROGRAM
Payer: COMMERCIAL

## 2024-09-09 VITALS
TEMPERATURE: 98.3 F | OXYGEN SATURATION: 97 % | SYSTOLIC BLOOD PRESSURE: 104 MMHG | WEIGHT: 147.8 LBS | HEART RATE: 74 BPM | DIASTOLIC BLOOD PRESSURE: 60 MMHG | BODY MASS INDEX: 23.2 KG/M2 | RESPIRATION RATE: 16 BRPM | HEIGHT: 67 IN

## 2024-09-09 DIAGNOSIS — E11.65 UNCONTROLLED TYPE 2 DIABETES MELLITUS WITH HYPERGLYCEMIA (H): Primary | ICD-10-CM

## 2024-09-09 DIAGNOSIS — E11.65 CONTROLLED TYPE 2 DIABETES MELLITUS WITH HYPERGLYCEMIA, WITH LONG-TERM CURRENT USE OF INSULIN (H): ICD-10-CM

## 2024-09-09 DIAGNOSIS — R63.4 UNEXPLAINED WEIGHT LOSS: ICD-10-CM

## 2024-09-09 DIAGNOSIS — R35.0 URINARY FREQUENCY: ICD-10-CM

## 2024-09-09 DIAGNOSIS — Z79.4 CONTROLLED TYPE 2 DIABETES MELLITUS WITH HYPERGLYCEMIA, WITH LONG-TERM CURRENT USE OF INSULIN (H): ICD-10-CM

## 2024-09-09 PROCEDURE — 99214 OFFICE O/P EST MOD 30 MIN: CPT | Performed by: STUDENT IN AN ORGANIZED HEALTH CARE EDUCATION/TRAINING PROGRAM

## 2024-09-09 PROCEDURE — G0463 HOSPITAL OUTPT CLINIC VISIT: HCPCS

## 2024-09-09 PROCEDURE — G2211 COMPLEX E/M VISIT ADD ON: HCPCS | Performed by: STUDENT IN AN ORGANIZED HEALTH CARE EDUCATION/TRAINING PROGRAM

## 2024-09-09 RX ORDER — ACYCLOVIR 400 MG/1
1 TABLET ORAL DAILY
Qty: 1 EACH | Refills: 0 | Status: SHIPPED | OUTPATIENT
Start: 2024-09-09

## 2024-09-09 RX ORDER — ACYCLOVIR 400 MG/1
1 TABLET ORAL
Qty: 3 EACH | Refills: 11 | Status: SHIPPED | OUTPATIENT
Start: 2024-09-09 | End: 2024-09-09

## 2024-09-09 RX ORDER — ACYCLOVIR 400 MG/1
1 TABLET ORAL
Qty: 9 EACH | Refills: 11 | Status: SHIPPED | OUTPATIENT
Start: 2024-09-09

## 2024-09-09 ASSESSMENT — PAIN SCALES - GENERAL: PAINLEVEL: NO PAIN (0)

## 2024-09-09 NOTE — NURSING NOTE
"Chief Complaint   Patient presents with    Diabetes       Initial /60   Pulse 74   Temp 98.3  F (36.8  C) (Temporal)   Resp 16   Ht 1.702 m (5' 7\")   Wt 67 kg (147 lb 12.8 oz)   SpO2 97%   BMI 23.15 kg/m   Estimated body mass index is 23.15 kg/m  as calculated from the following:    Height as of this encounter: 1.702 m (5' 7\").    Weight as of this encounter: 67 kg (147 lb 12.8 oz).  Medication Review: complete    The next two questions are to help us understand your food security.  If you are feeling you need any assistance in this area, we have resources available to support you today.          7/1/2024   SDOH- Food Insecurity   Within the past 12 months, did you worry that your food would run out before you got money to buy more? N   Within the past 12 months, did the food you bought just not last and you didn t have money to get more? N            Health Care Directive:  Patient has a Health Care Directive on file      Kasey Matthew LPN      "

## 2024-09-09 NOTE — PROGRESS NOTES
Assessment & Plan         ICD-10-CM    1. Uncontrolled type 2 diabetes mellitus with hyperglycemia (H)  E11.65 insulin glargine (LANTUS PEN) 100 UNIT/ML pen      2. Controlled type 2 diabetes mellitus with hyperglycemia, with long-term current use of insulin (H)  E11.65 Continuous Glucose  (DEXCOM G7 ) SOSA    Z79.4 Continuous Glucose Sensor (DEXCOM G7 SENSOR) MISC     DISCONTINUED: Continuous Glucose Sensor (DEXCOM G7 SENSOR) MISC      3. Unexplained weight loss  R63.4       4. Urinary frequency  R35.0         Reviewed his blood sugar logs and will increase his long-acting insulin from 12 units to 16 units daily.  He has made changes to his diet, but will likely need mealtime insulin as well.  Prescribe Dexcom and he has an appointment upcoming with diabetes education.  At that appointment they will discuss diabetes further in length, management of Dexcom and discussion of mealtime insulin management.  Likely start could add metformin, glipizide etc. in the future but would like to help him gain some of his weight back at this point.    Urinary frequency, BPH: Greatly improved with discovering his uncontrolled diabetes.  Keep urology appointment for now, can cancel if symptoms resolve.    Follow-up with me in approximately 1 month for diabetes follow-up, sooner if needed.    The longitudinal plan of care for the diagnosis(es)/condition(s) as documented were addressed during this visit. Due to the added complexity in care, I will continue to support Jeffrey in the subsequent management and with ongoing continuity of care.      No follow-ups on file.    Luis Clemons MD  [facility]      Subjective   Jeffrey is a 89 year old, presenting for the following health issues:  Diabetes        9/9/2024     1:50 PM   Additional Questions   Roomed by Edouard Matthew LPN     HPI     New diagnosis of uncontrolled diabetes  Taking insulin glargine 12 units at bedtime.   Taking blood sugars  "fasting blood sugars have been 104-266.   Only low 100s 1 time.     He is tolerating his insulin well.   He has made changes to his diet as well    Urination has improved, leakage has improved as well.   Using urinal.     Has appointment with diabetes education coming up.           Diabetes Follow-up    How often are you checking your blood sugar? Four or more times daily  Blood sugar testing frequency justification:  Uncontrolled diabetes  What time of day are you checking your blood sugars (select all that apply)?  Before and after meals and At bedtime  Have you had any blood sugars above 200?  Yes   Have you had any blood sugars below 70?  No  What symptoms do you notice when your blood sugar is low?  None and Not applicable  What concerns do you have today about your diabetes? None and Other: High Blood Sugars    Do you have any of these symptoms? (Select all that apply)  No numbness or tingling in feet.  No redness, sores or blisters on feet.  No complaints of excessive thirst.  No reports of blurry vision.  No significant changes to weight.  Have you had a diabetic eye exam in the last 12 months? No        BP Readings from Last 2 Encounters:   09/09/24 104/60   08/26/24 122/60     Hemoglobin A1C (%)   Date Value   08/26/2024 11.7 (H)   01/11/2023 5.8   12/14/2020 5.3   12/12/2019 5.4     LDL Cholesterol Calculated (mg/dL)   Date Value   04/09/2024 39   01/11/2023 52   12/14/2020 53   12/12/2019 47                 Objective    /60   Pulse 74   Temp 98.3  F (36.8  C) (Temporal)   Resp 16   Ht 1.702 m (5' 7\")   Wt 67 kg (147 lb 12.8 oz)   SpO2 97%   BMI 23.15 kg/m    Body mass index is 23.15 kg/m .  Physical Exam   Skin/MSK: Diabetic foot exam was performed with intact sensation to monofilament testing. No wounds or callous appreciated.               Signed Electronically by: Luis Clemons MD    "

## 2024-09-18 ENCOUNTER — ALLIED HEALTH/NURSE VISIT (OUTPATIENT)
Dept: EDUCATION SERVICES | Facility: OTHER | Age: 89
End: 2024-09-18
Attending: STUDENT IN AN ORGANIZED HEALTH CARE EDUCATION/TRAINING PROGRAM
Payer: MEDICARE

## 2024-09-18 DIAGNOSIS — E11.65 UNCONTROLLED TYPE 2 DIABETES MELLITUS WITH HYPERGLYCEMIA (H): ICD-10-CM

## 2024-09-18 PROCEDURE — 95249 CONT GLUC MNTR PT PROV EQP: CPT | Performed by: REGISTERED NURSE

## 2024-09-18 NOTE — PROGRESS NOTES
Diabetes Self-Management Education & Support    Presents for:      Type of Service: In Person Visit      ASSESSMENT:  Patient newly diagnosed T2DM.  Discussed pathophysiology with interpretation and meaning of HgA1c.  Stressed importance of testing BG daily to help keep tight control of T2DM, helping to minimize risk for possible complications of uncontrolled diabetes.  Discussed benefits of keeping A1c under 8% and encouraged patient to begin testing BG daily.    Discussed carbohydrate counting using the Plate Method for portion control.  Reviewed balanced diet, food groups and incorporating variety, including fruits/vegetables/whole grains/lean protein/nuts and seeds into meals.      A key strategy in this plan is, along with medication need, to participate in low to moderate physical activity, such as walking, working up to a minimum of 30 minutes most days, as tolerated.       Begin carbohydrate countin - 45 grams with breakfast, 30 - 45 grams with lunch and 30 - 45 grams with supper.       Glucose Monitoring:   Patient brings in his Dexcom G7 for training.  He was instructed in features of DEXCOM G7 CGMS.  Patient was instructed in programming events for meds, CHO, exercise and health such as stress, illness, high/low symptoms.  He will wear the sensor for 10 days and if can would like, can download at home using Dexcom Clarity software found at www.dexcom.com.       Patient demonstrated understanding by inserting his own sensor.  Sensor inserted without difficulty,  receiving data.  Helped patient program his .       Insulin Training Review:  1) Patient understands rationale for insulin.   2) Patient understands the relationship between elevated A1C and risk for complications.   3) Patient did not express concerns regarding insulin.   4) Patient states he has been giving insulin once daily, without difficulty.     5) Patient can state insulin action, insulin duration, insulin storage,  "needle disposal and injection site care and rotation.   6) Patient can state hypoglycemia signs and symptoms and preferred treatment.   7) All information given in writing.       Patient's most recent   Lab Results   Component Value Date    A1C 11.7 08/26/2024    A1C 5.3 12/14/2020     is not meeting goal of <8.0    Diabetes knowledge and skills assessment:   Patient is knowledgeable in diabetes management concepts related to: Being Active, Monitoring, and Taking Medication    Continue education with the following diabetes management concepts: Healthy Eating, Problem Solving, Reducing Risks, and Healthy Coping    Based on learning assessment above, most appropriate setting for further diabetes education would be: Group class or Individual setting.      PLAN  Begin using Dexcom G7 CGM.    Due to fasting glucose 200s, today, 219 mg/dL, recommend Lantus increase from 16 to 18 units, as prescribed, every evening at bedtime.       Follow-up: TBD per patient schedule.     See Care Plan for co-developed, patient-state behavior change goals.  AVS provided for patient today.    Education Materials Provided:  Type 2 Diabetes packet, My Insulin Plan, Dexcom G7 CGM Guide, Planning Healthy Meals, Activity Pyramid, A1c flyer, Tips on SMBG, Diabetes Success Plan, DSMS sheet and New T2DM  folder.       SUBJECTIVE/OBJECTIVE:     Cultural Influences/Ethnic Background:  Not  or       Diabetes Symptoms & Complications:          Patient Problem List and Family Medical History reviewed for relevant medical history, current medical status, and diabetes risk factors.    Vitals:  There were no vitals taken for this visit.  Estimated body mass index is 23.15 kg/m  as calculated from the following:    Height as of 9/9/24: 1.702 m (5' 7\").    Weight as of 9/9/24: 67 kg (147 lb 12.8 oz).   Last 3 BP:   BP Readings from Last 3 Encounters:   09/09/24 104/60   08/26/24 122/60   07/01/24 118/66       History   Smoking Status    Former " "   Types: Cigarettes   Smokeless Tobacco    Never       Labs:  Lab Results   Component Value Date    A1C 11.7 08/26/2024    A1C 5.3 12/14/2020     Lab Results   Component Value Date     08/26/2024     04/07/2022     12/14/2020     Lab Results   Component Value Date    LDL 39 04/09/2024    LDL 53 12/14/2020     HDL Cholesterol   Date Value Ref Range Status   12/14/2020 48 23 - 92 mg/dL Final     Direct Measure HDL   Date Value Ref Range Status   04/09/2024 56 >=40 mg/dL Final   ]  GFR Estimate   Date Value Ref Range Status   08/26/2024 85 >60 mL/min/1.73m2 Final     Comment:     eGFR calculated using 2021 CKD-EPI equation.   12/14/2020 84 >60 mL/min/[1.73_m2] Final     GFR Estimate If Black   Date Value Ref Range Status   12/14/2020 >90 >60 mL/min/[1.73_m2] Final     Lab Results   Component Value Date    CR 0.74 08/26/2024    CR 0.86 12/14/2020     No results found for: \"MICROALBUMIN\"      Taking Medications:  Diabetes Medication(s)       Insulin       insulin glargine (LANTUS PEN) 100 UNIT/ML pen Inject 18 Units subcutaneously at bedtime.            Healthy Coping:     Patient Activation Measure Survey Score:       No data to display                  Care Plan and Education Provided:  Care Plan: Diabetes   Updates made by Anabell Mcbride RN since 9/18/2024 12:00 AM        Problem: HbA1C Not In Goal         Goal: Establish Regular Follow-Ups with PCP         Task: Discuss with PCP the recommended timing for patient's next follow up visit(s)    Responsible User: Anabell Mcbride RN        Task: Discuss schedule for PCP visits with patient Completed 9/18/2024   Responsible User: Anabell Mcbride RN        Goal: Get HbA1C Level in Goal         Task: Educate patient on diabetes education self-management topics Completed 9/18/2024   Responsible User: Anabell Mcbride RN        Task: Educate patient on benefits of regular glucose monitoring Completed 9/18/2024   Responsible User: Anabell Mcbride RN "        Task: Refer patient to appropriate extended care team member, as needed (Medication Therapy Management, Behavioral Health, Physical Therapy, etc.)    Responsible User: Anabell Mcbride RN        Task: Discuss diabetes treatment plan with patient Completed 9/18/2024   Responsible User: Anabell Mcbride RN        Problem: Diabetes Self-Management Education Needed to Optimize Self-Care Behaviors         Goal: Understand diabetes pathophysiology and disease progression         Task: Provide education on diabetes pathophysiology and disease progression specfic to patient's diabetes type    Responsible User: Anabell Mcbride RN        Goal: Healthy Eating - follow a healthy eating pattern for diabetes         Task: Provide education on portion control and consistency in amount, composition and timing of food intake Completed 9/18/2024   Responsible User: Anabell Mcbride RN        Task: Provide education on managing carbohydrate intake (carbohydrate counting, plate planning method, etc.)    Responsible User: Anabell Mcbride RN        Task: Provide education on weight management    Responsible User: Anabell Mcbride RN        Task: Provide education on heart healthy eating    Responsible User: Anabell Mcbride RN        Task: Provide education on eating out    Responsible User: Anabell Mcbride RN        Task: Develop individualized healthy eating plan with patient    Responsible User: Anabell Mcbride RN        Goal: Being Active - get regular physical activity, working up to at least 150 minutes per week         Task: Provide education on relationship of activity to glucose and precautions to take if at risk for low glucose Completed 9/18/2024   Responsible User: Anabell Mcbride RN        Task: Discuss barriers to physical activity with patient    Responsible User: Anabell Mcbride RN        Task: Develop physical activity plan with patient    Responsible User: Anabell Mcbride RN        Task: Explore community  resources including walking groups, assistance programs, and home videos    Responsible User: Anabell Mcbride RN        Goal: Monitoring - monitor glucose and ketones as directed         Task: Provide education on blood glucose monitoring (purpose, proper technique, frequency, glucose targets, interpreting results, when to use glucose control solution, sharps disposal)    Responsible User: Anabell Mcbride RN        Task: Provide education on continuous glucose monitoring (sensor placement, use of luis or /reader, understanding glucose trends, alerts and alarms, differences between sensor glucose and blood glucose) Completed 9/18/2024   Responsible User: Anabell Mcbride RN        Task: Provide education on ketone monitoring (when to monitor, frequency, etc.)    Responsible User: Anabell Mcbride RN        Goal: Taking Medication - patient is consistently taking medications as directed         Task: Provide education on action of prescribed medication, including when to take and possible side effects Completed 9/18/2024   Responsible User: Anabell Mcbride RN        Task: Provide education on insulin and injectable diabetes medications, including administration, storage, site selection and rotation for injection sites Completed 9/18/2024   Responsible User: Anabell Mcbride RN        Task: Discuss barriers to medication adherence with patient and provide management technique ideas as appropriate    Responsible User: Anabell Mcbride RN        Task: Provide education on frequency and refill details of medications    Responsible User: Anabell Mcbride RN        Goal: Problem Solving - know how to prevent and manage short-term diabetes complications         Task: Provide education on high blood glucose - causes, signs/symptoms, prevention and treatment Completed 9/18/2024   Responsible User: Anabell Mcbride RN        Task: Provide education on low blood glucose - causes, signs/symptoms, prevention, treatment,  carrying a carbohydrate source at all times, and medical identification Completed 9/18/2024   Responsible User: Anabell Mcbride RN        Task: Provide education on safe travel with diabetes    Responsible User: Anabell Mcbride RN        Task: Provide education on how to care for diabetes on sick days    Responsible User: Anabell Mcbride RN        Task: Provide education on when to call a health care provider    Responsible User: Anabell Mcbride RN        Goal: Reducing Risks - know how to prevent and treat long-term diabetes complications         Task: Provide education on major complications of diabetes, prevention, early diagnostic measures and treatment of complications Completed 9/18/2024   Responsible User: Anabell Mcbride RN        Task: Provide education on recommended care for dental, eye and foot health    Responsible User: Anabell Mcbride RN        Task: Provide education on Hemoglobin A1c - goals and relationship to blood glucose levels Completed 9/18/2024   Responsible User: Anabell Mcbride RN        Task: Provide education on recommendations for heart health - lipid levels and goals, blood pressure and goals, and aspirin therapy, if indicated    Responsible User: Anabell Mcbride RN        Task: Provide education on tobacco cessation    Responsible User: Anabell Mcbride RN        Goal: Healthy Coping - use available resources to cope with the challenges of managing diabetes         Task: Discuss recognizing feelings about having diabetes    Responsible User: Anabell Mcbride RN        Task: Provide education on the benefits of making appropriate lifestyle changes Completed 9/18/2024   Responsible User: Anabell Mcbride RN        Task: Provide education on benefits of utilizing support systems    Responsible User: Anabell Mcbride RN        Task: Discuss methods for coping with stress    Responsible User: Anabell Mcbride RN        Task: Provide education on when to seek professional counseling     Responsible User: Anabell Mcbride RN Suzette Childs, RN, BSN, [facility]  9/18/2024 5:43 PM     Time Spent: 90 minutes  Encounter Type: Individual    Any diabetes medication dose changes were made via the CDE Protocol per the patient's primary care provider. A copy of this encounter was shared with the provider.

## 2024-09-18 NOTE — PATIENT INSTRUCTIONS
Diabetes Goals and Reminders    Your A1c test should be done every 3 months.  Your goal is less than 8%.   Your last result is:  Lab Results   Component Value Date    A1C 11.7 08/26/2024    A1C 5.3 12/14/2020       Your LDL cholesterol test should be done at least once a year.  Take a statin, if prescribed by your doctor, based on age and risk factors.  Your last result is:  LDL Cholesterol Calculated   Date Value Ref Range Status   04/09/2024 39 <=100 mg/dL Final   12/14/2020 53 <100 mg/dL Final     Comment:     Desirable:       <100 mg/dl       Have blood pressure and weight checked every three months.  Your blood pressure goal is 140/90 or less.  Your last blood pressure reading was:   BP Readings from Last 1 Encounters:   09/09/24 104/60       Use your CGM to monitor sensor glucose readings daily.  Your home glucose target ranges are:  Before meals:    2 hours after a meal:  Less than 200  Bedtime:  110-140 mg/dL      Avoid all tobacco.  Follow your healthy diet and exercise plan.  See the eye doctor every year.  See the dentist every six months.  Have kidney function tested yearly.    Take all medicine as prescribed.  Please let me know if you are having symptoms you don t expect or if you wish to stop any medicine.    Follow Up Plan  Please call or visit Diabetes Education if blood sugars are consistently out of target.  Your future lab plan is:  HgA1c in November 2024.    If you need your cholesterol checked at your next appointment, you should fast 8 to 10 hours before your appointment.  Do not eat or drink anything besides water.  Drink plenty of water and take all your usual medicine.    SUMMARY FOR TODAY'S VISIT    1.  Begin using your Continuous Glucose Monitoring (CGM) for testing blood sugar daily.      2.  Discussed carbohydrate counting using the Plate Method for portion control.  Reviewed balanced diet, food groups and incorporating variety, including fruits/vegetables/whole grains/lean  protein/nuts and seeds into meals.         Recommend beginning to count carbohydrates following the low carb plan:  30 - 45 grams with breakfast, 30 - 45 grams with lunch and 30 - 45 grams with dinner.       A key strategy in this plan is, along with medication need, to participate in low to moderate physical activity, such as walking, working up to a minimum of 30 minutes most days, as tolerated.        3.  Reviewed insulin dosing and use of the Lantus pen and how to dispose of sharps in a laundry container with a screw top lid.      4.  Treatment options:  Due to fasting glucose 200s, today, 219 mg/dL, recommend Lantus increase from 16 to 18 units, as prescribed, every evening at bedtime.       5.  Please follow-up for continued diabetes education, as needed.       Anabell Mcbride RN, BSN, [facility]  9/18/2024 4:27 PM

## 2024-10-08 ENCOUNTER — OFFICE VISIT (OUTPATIENT)
Dept: INTERNAL MEDICINE | Facility: OTHER | Age: 89
End: 2024-10-08
Attending: STUDENT IN AN ORGANIZED HEALTH CARE EDUCATION/TRAINING PROGRAM
Payer: COMMERCIAL

## 2024-10-08 VITALS
RESPIRATION RATE: 16 BRPM | SYSTOLIC BLOOD PRESSURE: 116 MMHG | BODY MASS INDEX: 23.17 KG/M2 | TEMPERATURE: 98.1 F | HEART RATE: 99 BPM | WEIGHT: 147.6 LBS | DIASTOLIC BLOOD PRESSURE: 60 MMHG | OXYGEN SATURATION: 94 % | HEIGHT: 67 IN

## 2024-10-08 DIAGNOSIS — N40.1 BENIGN PROSTATIC HYPERPLASIA WITH NOCTURIA: ICD-10-CM

## 2024-10-08 DIAGNOSIS — N40.0 BPH (BENIGN PROSTATIC HYPERPLASIA): Primary | ICD-10-CM

## 2024-10-08 DIAGNOSIS — E11.65 UNCONTROLLED TYPE 2 DIABETES MELLITUS WITH HYPERGLYCEMIA (H): Primary | ICD-10-CM

## 2024-10-08 DIAGNOSIS — R35.1 BENIGN PROSTATIC HYPERPLASIA WITH NOCTURIA: ICD-10-CM

## 2024-10-08 PROCEDURE — 99215 OFFICE O/P EST HI 40 MIN: CPT | Mod: 25 | Performed by: STUDENT IN AN ORGANIZED HEALTH CARE EDUCATION/TRAINING PROGRAM

## 2024-10-08 PROCEDURE — G0463 HOSPITAL OUTPT CLINIC VISIT: HCPCS

## 2024-10-08 PROCEDURE — 95251 CONT GLUC MNTR ANALYSIS I&R: CPT | Performed by: STUDENT IN AN ORGANIZED HEALTH CARE EDUCATION/TRAINING PROGRAM

## 2024-10-08 ASSESSMENT — PAIN SCALES - GENERAL: PAINLEVEL: NO PAIN (0)

## 2024-10-08 NOTE — NURSING NOTE
"Chief Complaint   Patient presents with    Diabetes       Initial /60   Pulse 99   Temp 98.1  F (36.7  C) (Temporal)   Resp 16   Ht 1.702 m (5' 7\")   Wt 67 kg (147 lb 9.6 oz)   SpO2 94%   BMI 23.12 kg/m   Estimated body mass index is 23.12 kg/m  as calculated from the following:    Height as of this encounter: 1.702 m (5' 7\").    Weight as of this encounter: 67 kg (147 lb 9.6 oz).  Medication Review: complete    The next two questions are to help us understand your food security.  If you are feeling you need any assistance in this area, we have resources available to support you today.          7/1/2024   SDOH- Food Insecurity   Within the past 12 months, did you worry that your food would run out before you got money to buy more? N   Within the past 12 months, did the food you bought just not last and you didn t have money to get more? N            Health Care Directive:  Patient has a Health Care Directive on file      Kasey Matthew LPN      "

## 2024-10-08 NOTE — PROGRESS NOTES
Assessment & Plan         ICD-10-CM    1. Benign prostatic hyperplasia with nocturia  N40.1     R35.1       2. Uncontrolled type 2 diabetes mellitus with hyperglycemia (H)  E11.65 insulin glargine (LANTUS PEN) 100 UNIT/ML pen     GLUCOSE MONITOR, 72 HOUR, EVANGELINA CALVO        New diagnosis of type 2 diabetes in August of this year with initial A1c of 11.7.  Dexcom reviewed today and estimated A1c of about 8.4.  Too early for repeat A1c.  Increase his long-acting insulin from 18 units to 22 units and he will contact me with any lows.  We are hopeful to help him gain some more weight back before adding an SGLT2 etc as he had significant weight loss prior to being diagnosed with diabetes.  We had a prolonged discussion of the management of diabetes and review of insulin A1c etc.  He will follow-up with me in 2 months for a repeat hemoglobin A1c and review of his Dexcom data.  He will call me if he is having a low blood sugars and he will establish with a new primary care doctor in early 2025 for a Medicare wellness exam.      BPH: Nocturia frequency has drastically increased from every hour or so to about 3 times per night but he still is quite symptomatic if he would miss a dose of tamsulosin.  He will keep his appointment with urology.  Certainly some of his urinary urgency was undiagnosed diabetes.  Hopefully with better control his nocturia will improve as well but he is also maxed out on BPH medications.    42 minutes spent by me on the date of the encounter doing chart review, history and exam, documentation and further activities per the note        No follow-ups on file.    Luis lCemons MD  [facility]      Tim Murphy is a 89 year old, presenting for the following health issues:  Diabetes        10/8/2024     2:08 PM   Additional Questions   Roomed by Edouard Matthew LPN     HPI     18 units of glargine at night.   No low blood sugars.   Lowest he has seen is 160    Wt Readings from  Last 5 Encounters:   10/08/24 67 kg (147 lb 9.6 oz)   09/09/24 67 kg (147 lb 12.8 oz)   08/26/24 63.5 kg (140 lb)   07/01/24 68.1 kg (150 lb 2 oz)   04/29/24 76.7 kg (169 lb)     Appetite has returned.   Not eating ice cubes.   Urination improved as well.   Still getting up 3 times at night to urinate.   He is on finasteride and max dose flomax.   Has appt with urology upcoming.               Diabetes Follow-up    How often are you checking your blood sugar? Continuous glucose monitor  What time of day are you checking your blood sugars (select all that apply)?   CGM  Have you had any blood sugars above 200?  Yes   Have you had any blood sugars below 70?  No  What symptoms do you notice when your blood sugar is low?  Not applicable  What concerns do you have today about your diabetes? None   Do you have any of these symptoms? (Select all that apply)  No numbness or tingling in feet.  No redness, sores or blisters on feet.  No complaints of excessive thirst.  No reports of blurry vision.  No significant changes to weight.  Have you had a diabetic eye exam in the last 12 months? No      -----------------------------  Dexcom Clarity  -----------------------------  Jeffrey Cespedes  YOB: 1932  Generated at: Tue, Oct 8, 2024 2:20 PM CDT  Reporting period: Thu Apr 4, 2024 - Wed Apr 17, 2024  -----------------------------  Glucose Details  Average glucose: 212 mg/dL  Standard deviation: 45 mg/dL  GMI: 8.4%  -----------------------------  Time in Range  Very High: 20%  High: 52%  In Range: 28%  Low: 0%  Very Low: 0%  Target Range   mg/dL    -----------------------------  CGM Details  Sensor usage: 100%  Days with CGM data: 14/14    BP Readings from Last 2 Encounters:   10/08/24 116/60   09/09/24 104/60     Hemoglobin A1C (%)   Date Value   08/26/2024 11.7 (H)   01/11/2023 5.8   12/14/2020 5.3   12/12/2019 5.4     LDL Cholesterol Calculated (mg/dL)   Date Value   04/09/2024 39   01/11/2023 52   12/14/2020  "53   12/12/2019 47                     Objective    /60   Pulse 99   Temp 98.1  F (36.7  C) (Temporal)   Resp 16   Ht 1.702 m (5' 7\")   Wt 67 kg (147 lb 9.6 oz)   SpO2 94%   BMI 23.12 kg/m    Body mass index is 23.12 kg/m .  Physical Exam   General: Pleasant 89-year-old man sitting in clinic no acute distress          Signed Electronically by: Luis Clemons MD    "

## 2024-10-09 ENCOUNTER — IMMUNIZATION (OUTPATIENT)
Dept: FAMILY MEDICINE | Facility: OTHER | Age: 89
End: 2024-10-09
Attending: STUDENT IN AN ORGANIZED HEALTH CARE EDUCATION/TRAINING PROGRAM
Payer: MEDICARE

## 2024-10-09 DIAGNOSIS — Z23 HIGH PRIORITY FOR 2019-NCOV VACCINE: ICD-10-CM

## 2024-10-09 DIAGNOSIS — Z23 NEED FOR PROPHYLACTIC VACCINATION AND INOCULATION AGAINST INFLUENZA: Primary | ICD-10-CM

## 2024-10-09 PROCEDURE — 91320 SARSCV2 VAC 30MCG TRS-SUC IM: CPT

## 2024-10-09 PROCEDURE — 90480 ADMN SARSCOV2 VAC 1/ONLY CMP: CPT

## 2024-10-09 PROCEDURE — G0008 ADMIN INFLUENZA VIRUS VAC: HCPCS

## 2024-10-22 ENCOUNTER — LAB (OUTPATIENT)
Dept: LAB | Facility: OTHER | Age: 89
End: 2024-10-22
Attending: UROLOGY
Payer: COMMERCIAL

## 2024-10-22 ENCOUNTER — OFFICE VISIT (OUTPATIENT)
Dept: UROLOGY | Facility: OTHER | Age: 89
End: 2024-10-22
Payer: MEDICARE

## 2024-10-22 VITALS
WEIGHT: 150.8 LBS | HEART RATE: 82 BPM | SYSTOLIC BLOOD PRESSURE: 126 MMHG | RESPIRATION RATE: 16 BRPM | BODY MASS INDEX: 23.62 KG/M2 | OXYGEN SATURATION: 94 % | TEMPERATURE: 98.1 F | DIASTOLIC BLOOD PRESSURE: 60 MMHG

## 2024-10-22 DIAGNOSIS — R35.1 BENIGN PROSTATIC HYPERPLASIA WITH NOCTURIA: ICD-10-CM

## 2024-10-22 DIAGNOSIS — N40.1 BENIGN PROSTATIC HYPERPLASIA WITH NOCTURIA: ICD-10-CM

## 2024-10-22 DIAGNOSIS — N40.0 BPH (BENIGN PROSTATIC HYPERPLASIA): ICD-10-CM

## 2024-10-22 LAB
ALBUMIN UR-MCNC: 10 MG/DL
APPEARANCE UR: CLEAR
BILIRUB UR QL STRIP: NEGATIVE
COLOR UR AUTO: YELLOW
GLUCOSE UR STRIP-MCNC: NEGATIVE MG/DL
HGB UR QL STRIP: NEGATIVE
KETONES UR STRIP-MCNC: NEGATIVE MG/DL
LEUKOCYTE ESTERASE UR QL STRIP: NEGATIVE
NITRATE UR QL: NEGATIVE
PH UR STRIP: 6 [PH] (ref 5–9)
SP GR UR STRIP: 1.02 (ref 1–1.03)
UROBILINOGEN UR STRIP-MCNC: NORMAL MG/DL

## 2024-10-22 PROCEDURE — 81003 URINALYSIS AUTO W/O SCOPE: CPT | Mod: ZL

## 2024-10-22 PROCEDURE — 99204 OFFICE O/P NEW MOD 45 MIN: CPT | Performed by: UROLOGY

## 2024-10-22 PROCEDURE — G0463 HOSPITAL OUTPT CLINIC VISIT: HCPCS

## 2024-10-22 ASSESSMENT — PAIN SCALES - GENERAL: PAINLEVEL: NO PAIN (0)
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
## 2024-10-22 NOTE — PATIENT INSTRUCTIONS
Buy and wear compression stockings during the day.  Take off at night  Limit your salt intake  Limit your fluid intake after 7 pm  Limit your caffeine especially after noon  Lower your dose of tamsulosin to 1 capsule daily  Remain on your finasteride  7.   Watch for dizziness upon standing

## 2024-10-22 NOTE — Clinical Note
Luis, I think Mr. Cespedes is doing reasonably well after his diabetes was addressed.  I would lower his dose of the tamsulosin for fear of causing dizziness.  I do recommend compression hose as well as limitation of salt.  I would avoid an anticholinergic given the risk of dementia and crossing into the brain.  If his symptoms were to persist then may be a beta agonist such as Myrbetriq or Gemtesa provided his blood pressure did not increase.  Overall he was reassured.  Thank you for sending him our way.

## 2024-10-22 NOTE — NURSING NOTE
"Chief Complaint   Patient presents with    Consult     BPH with nocturia   Patient presents to the clinic today for a consult for BPH with Nocturia  AUA 18  Post-Void Residual  A post-void residual was measured by ultrasonic bladder scanner.  19 mL  Ayah Brown LPN  10/22/2024 2:51 PM        Initial There were no vitals taken for this visit. Estimated body mass index is 23.12 kg/m  as calculated from the following:    Height as of 10/8/24: 1.702 m (5' 7\").    Weight as of 10/8/24: 67 kg (147 lb 9.6 oz).  Meds Reconciled: complete      Ayah Brown LPN, LPN on 10/22/2024 at 2:41 PM  Ext. [phone]        Ayah Brown LPN  "

## 2024-11-01 ENCOUNTER — TELEPHONE (OUTPATIENT)
Dept: INTERNAL MEDICINE | Facility: OTHER | Age: 89
End: 2024-11-01
Payer: COMMERCIAL

## 2024-11-01 NOTE — TELEPHONE ENCOUNTER
Patient states he has been having some low blood sugars issues recently and is wondering if he needs his insulin dose to be changed.  Please advise.  Tawana Douglass on 11/1/2024 at 11:38 AM

## 2024-11-01 NOTE — TELEPHONE ENCOUNTER
Please have him decrease his lantus to 10 units- please have him continue to monitor blood sugars and notify us if blood sugars are >300 or less than 70 and/or if he has any new symptoms.

## 2024-11-01 NOTE — TELEPHONE ENCOUNTER
Patient states that on Monday about 530 pm got notification of low BS in the 70's.  Also got another notification last night about 3am that BS was in the 70's/   Taking 22units of Lantus pen at 11pm   Wondering if he overdosing himself?

## 2024-11-01 NOTE — TELEPHONE ENCOUNTER
"EMIR Toney NP -   Please see Cyn's note below for reference. Patient is wondering if he should decrease lantus. He sees Dr. Clemons 12/12/24.   Mackenzie Richard RN on 11/1/2024 at 12:44 PM      10/8/24 OV note:   \"New diagnosis of type 2 diabetes in August of this year with initial A1c of 11.7.  Dexcom reviewed today and estimated A1c of about 8.4.  Too early for repeat A1c.  Increase his long-acting insulin from 18 units to 22 units and he will contact me with any lows.  We are hopeful to help him gain some more weight back before adding an SGLT2 etc as he had significant weight loss prior to being diagnosed with diabetes.  We had a prolonged discussion of the management of diabetes and review of insulin A1c etc.  He will follow-up with me in 2 months for a repeat hemoglobin A1c and review of his Dexcom data.  He will call me if he is having a low blood sugars and he will establish with a new primary care doctor in early 2025 for a Medicare wellness exam.\"  "

## 2024-11-04 ENCOUNTER — TELEPHONE (OUTPATIENT)
Dept: INTERNAL MEDICINE | Facility: OTHER | Age: 89
End: 2024-11-04
Payer: COMMERCIAL

## 2024-11-04 NOTE — TELEPHONE ENCOUNTER
A blood sugar of 163 is not a dangerous reading.  I would like him to increase his long-acting insulin (insulin glargine to 12 units daily.  He may continue increasing his insulin by 1 unit to achieve a blood sugar first thing in the morning of approximately 100-120.  If it is below this he needs to back off by 1 to 2 units/day.    Luis Clemons MD on 11/4/2024 at 4:52 PM

## 2024-11-04 NOTE — TELEPHONE ENCOUNTER
The patient needs to talk to doctor about his insulin.  Please call him right away as his sensor was off.  He did call the .  He got a new sensor and needs to have his insulin adjusted.

## 2024-11-04 NOTE — TELEPHONE ENCOUNTER
"Patient states Dexcom failed and was instructed by Tanisha Toney \"Please have him decrease his lantus to 10 units- please have him continue to monitor blood sugars and notify us if blood sugars are >300 or less than 70 and/or if he has any new symptoms. On 11/1/24.  Patient states he has new dexcom and his BG is running on average about 163.  Patient wanting to know what he should be taking for his Lantus pen  "

## 2024-11-05 NOTE — TELEPHONE ENCOUNTER
If he requires more than 20 units/day he can contact me or follow-up in clinic.  Likely will not need that much.    Luis Clemons MD on 11/5/2024 at 11:51 AM

## 2024-11-05 NOTE — TELEPHONE ENCOUNTER
After verifying patient name and , writer informed patient of PCP directions below. Patient stated understanding and has no further questions or concerns at this time.     Kasey Matthew LPN on 2024 at 1:36 PM   Ext. [phone]

## 2024-11-05 NOTE — TELEPHONE ENCOUNTER
After verifying patient name and , writer informed patient of insulin instructions from PCP below. Patient stated understand but is also asking if there is a cap or limit to the amount of insulin that the patient can titrate up to.   Kasey Matthew LPN on 2024 at 10:05 AM   Ext. [phone]

## 2024-11-18 ENCOUNTER — TELEPHONE (OUTPATIENT)
Dept: INTERNAL MEDICINE | Facility: OTHER | Age: 89
End: 2024-11-18
Payer: COMMERCIAL

## 2024-11-18 NOTE — TELEPHONE ENCOUNTER
Patient is calling requesting a call back from [facility]'s nurse.  Patient states he was on an increase in insulin for a few days, recording his blood sugars. Patient states he was told to call back when this was finished.    Tawana Douglass on 11/18/2024 at 11:53 AM

## 2024-11-18 NOTE — TELEPHONE ENCOUNTER
Left message for patient to return call and ask for a nurse in unit 1.     Kasey Matthew LPN on 11/18/2024 at 11:58 AM   Ext. [phone]

## 2024-11-19 NOTE — TELEPHONE ENCOUNTER
Incoming call from patient. Spoke to patient after verifying last name and date of birth. Patient informed writer of his blood sugars. States he took them every morning before breakfast and started this on November 6th. States he was told to increase increase insulin injector 1 unit per day. Started at 11 unit and now is up to 20 units. Average count was 165. Patient states last night he changed his sensor and it was 122. States he poked his finger and it was 130. Patient states he is concerned about his numbers and now it dropped. Patient would like to know what setting he should do in the future and what his PCP plans are for his diabetes in the future. He requests a call back. Informed patient that will route to PCP to review and advise. Down below are his readings.     11/6 - 167  11/7 - 175  11/8 -1 74  11/9 - 159  11/10 - 171  11/11 - 157   11/12 - 171  11/13 - 162  11/14 - 159   11/15 - 173   11/16 - 170  11/17 - 153   11/18 - 157      Giovani Fitch RN on 11/19/2024 at 9:02 AM

## 2024-11-19 NOTE — TELEPHONE ENCOUNTER
Overall these numbers are improved.  He can continue increasing his insulin by 1 unit/day to achieve a first thing in the morning fasting blood sugar of 100-120.  No other changes until our follow-up appointment.    Luis Clemons MD on 11/19/2024 at 1:10 PM

## 2024-11-19 NOTE — TELEPHONE ENCOUNTER
After verifying patient name and , writer informed patient of information below. Patient has no further questions or concerns at this time.     Kasey Matthew LPN on 2024 at 1:55 PM   Ext. [phone]

## 2024-11-30 ENCOUNTER — HEALTH MAINTENANCE LETTER (OUTPATIENT)
Age: 89
End: 2024-11-30

## 2024-12-12 ENCOUNTER — OFFICE VISIT (OUTPATIENT)
Dept: INTERNAL MEDICINE | Facility: OTHER | Age: 89
End: 2024-12-12
Attending: STUDENT IN AN ORGANIZED HEALTH CARE EDUCATION/TRAINING PROGRAM
Payer: MEDICARE

## 2024-12-12 VITALS
RESPIRATION RATE: 16 BRPM | HEIGHT: 67 IN | TEMPERATURE: 97.3 F | OXYGEN SATURATION: 97 % | DIASTOLIC BLOOD PRESSURE: 68 MMHG | HEART RATE: 80 BPM | BODY MASS INDEX: 23.7 KG/M2 | WEIGHT: 151 LBS | SYSTOLIC BLOOD PRESSURE: 116 MMHG

## 2024-12-12 DIAGNOSIS — R39.9 LOWER URINARY TRACT SYMPTOMS (LUTS): ICD-10-CM

## 2024-12-12 DIAGNOSIS — M17.2 POST-TRAUMATIC OSTEOARTHRITIS OF BOTH KNEES: ICD-10-CM

## 2024-12-12 DIAGNOSIS — E11.9 TYPE 2 DIABETES MELLITUS WITHOUT COMPLICATION, WITH LONG-TERM CURRENT USE OF INSULIN (H): ICD-10-CM

## 2024-12-12 DIAGNOSIS — Z79.4 TYPE 2 DIABETES MELLITUS WITHOUT COMPLICATION, WITH LONG-TERM CURRENT USE OF INSULIN (H): ICD-10-CM

## 2024-12-12 DIAGNOSIS — E11.65 UNCONTROLLED TYPE 2 DIABETES MELLITUS WITH HYPERGLYCEMIA (H): Primary | ICD-10-CM

## 2024-12-12 LAB
ANION GAP SERPL CALCULATED.3IONS-SCNC: 7 MMOL/L (ref 7–15)
BUN SERPL-MCNC: 24.1 MG/DL (ref 8–23)
CALCIUM SERPL-MCNC: 9.7 MG/DL (ref 8.8–10.4)
CHLORIDE SERPL-SCNC: 103 MMOL/L (ref 98–107)
CREAT SERPL-MCNC: 0.79 MG/DL (ref 0.67–1.17)
EGFRCR SERPLBLD CKD-EPI 2021: 83 ML/MIN/1.73M2
ERYTHROCYTE [DISTWIDTH] IN BLOOD BY AUTOMATED COUNT: 13.3 % (ref 10–15)
EST. AVERAGE GLUCOSE BLD GHB EST-MCNC: 117 MG/DL
GLUCOSE SERPL-MCNC: 162 MG/DL (ref 70–99)
HBA1C MFR BLD: 5.7 %
HCO3 SERPL-SCNC: 29 MMOL/L (ref 22–29)
HCT VFR BLD AUTO: 36.8 % (ref 40–53)
HGB BLD-MCNC: 13.3 G/DL (ref 13.3–17.7)
MCH RBC QN AUTO: 33.6 PG (ref 26.5–33)
MCHC RBC AUTO-ENTMCNC: 36.1 G/DL (ref 31.5–36.5)
MCV RBC AUTO: 93 FL (ref 78–100)
PLATELET # BLD AUTO: 95 10E3/UL (ref 150–450)
POTASSIUM SERPL-SCNC: 4 MMOL/L (ref 3.4–5.3)
RBC # BLD AUTO: 3.96 10E6/UL (ref 4.4–5.9)
SODIUM SERPL-SCNC: 139 MMOL/L (ref 135–145)
WBC # BLD AUTO: 6.7 10E3/UL (ref 4–11)

## 2024-12-12 PROCEDURE — 83036 HEMOGLOBIN GLYCOSYLATED A1C: CPT | Mod: ZL | Performed by: STUDENT IN AN ORGANIZED HEALTH CARE EDUCATION/TRAINING PROGRAM

## 2024-12-12 PROCEDURE — 85041 AUTOMATED RBC COUNT: CPT | Mod: ZL | Performed by: STUDENT IN AN ORGANIZED HEALTH CARE EDUCATION/TRAINING PROGRAM

## 2024-12-12 PROCEDURE — 80048 BASIC METABOLIC PNL TOTAL CA: CPT | Mod: ZL | Performed by: STUDENT IN AN ORGANIZED HEALTH CARE EDUCATION/TRAINING PROGRAM

## 2024-12-12 PROCEDURE — 82310 ASSAY OF CALCIUM: CPT | Mod: ZL | Performed by: STUDENT IN AN ORGANIZED HEALTH CARE EDUCATION/TRAINING PROGRAM

## 2024-12-12 PROCEDURE — 36415 COLL VENOUS BLD VENIPUNCTURE: CPT | Mod: ZL | Performed by: STUDENT IN AN ORGANIZED HEALTH CARE EDUCATION/TRAINING PROGRAM

## 2024-12-12 PROCEDURE — 85018 HEMOGLOBIN: CPT | Mod: ZL | Performed by: STUDENT IN AN ORGANIZED HEALTH CARE EDUCATION/TRAINING PROGRAM

## 2024-12-12 PROCEDURE — G0463 HOSPITAL OUTPT CLINIC VISIT: HCPCS | Mod: 25

## 2024-12-12 PROCEDURE — 250N000009 HC RX 250: Performed by: STUDENT IN AN ORGANIZED HEALTH CARE EDUCATION/TRAINING PROGRAM

## 2024-12-12 PROCEDURE — 20610 DRAIN/INJ JOINT/BURSA W/O US: CPT | Performed by: STUDENT IN AN ORGANIZED HEALTH CARE EDUCATION/TRAINING PROGRAM

## 2024-12-12 PROCEDURE — 250N000011 HC RX IP 250 OP 636: Mod: JZ | Performed by: STUDENT IN AN ORGANIZED HEALTH CARE EDUCATION/TRAINING PROGRAM

## 2024-12-12 RX ORDER — TRIAMCINOLONE ACETONIDE 40 MG/ML
80 INJECTION, SUSPENSION INTRA-ARTICULAR; INTRAMUSCULAR ONCE
Status: COMPLETED | OUTPATIENT
Start: 2024-12-12 | End: 2024-12-12

## 2024-12-12 RX ORDER — LIDOCAINE HYDROCHLORIDE 10 MG/ML
20 INJECTION, SOLUTION INFILTRATION; PERINEURAL ONCE
Status: COMPLETED | OUTPATIENT
Start: 2024-12-12 | End: 2024-12-12

## 2024-12-12 RX ADMIN — LIDOCAINE HYDROCHLORIDE 20 ML: 10 INJECTION, SOLUTION INFILTRATION; PERINEURAL at 15:51

## 2024-12-12 RX ADMIN — TRIAMCINOLONE ACETONIDE 80 MG: 40 INJECTION, SUSPENSION INTRA-ARTICULAR; INTRAMUSCULAR at 15:50

## 2024-12-12 ASSESSMENT — PAIN SCALES - GENERAL: PAINLEVEL_OUTOF10: NO PAIN (0)

## 2024-12-12 NOTE — PROGRESS NOTES
Assessment & Plan         ICD-10-CM    1. Uncontrolled type 2 diabetes mellitus with hyperglycemia (H)  E11.65 Basic Metabolic Panel     Hemoglobin A1c     CBC W PLT No Diff     insulin glargine (LANTUS PEN) 100 UNIT/ML pen     Basic Metabolic Panel     Hemoglobin A1c     CBC W PLT No Diff      2. Lower urinary tract symptoms (LUTS)  R39.9       3. Type 2 diabetes mellitus without complication, with long-term current use of insulin (H)  E11.9 metFORMIN (GLUCOPHAGE) 1000 MG tablet    Z79.4 GLUCOSE MONITOR, 72 HOUR, PHYS INTERP      4. Post-traumatic osteoarthritis of both knees  M17.2 triamcinolone (KENALOG-40) injection 80 mg     lidocaine 1 % injection 20 mL     DRAIN/INJECT LARGE JOINT/BURSA        Type 2 diabetes: Reviewed his Dexcom data and he has much better control however he is using 39 units of insulin glargine at night.  Recommend he increase his insulin glargine to 42 units and continue uptitrating by 1 unit/day to achieve a fasting blood sugar of 100-120 5 in the morning.  He is fairly level throughout the day but he does have some mealtime spikes.  We discussed holding off on adding mealtime insulin at this time but may need to do that in the future.  Will also start metformin 1000 mg twice daily.  He did gain 10 pounds back and I congratulated him on this as we are concerned about malignancy.  See prior notes for details as workup was negative.  Repeat hemoglobin A1c today.    Follow-up in 3 months for Medicare wellness exam, diabetes follow-up.  Sooner if needed.    Bilateral knee injections were performed today.  See procedure note below    BPH: Urinary symptoms are greatly improved managing his diabetes.  He is now getting about 3 hours of sleep at a time before having to wake up to urinate.  Could decrease his tamsulosin further in the future but he is quite satisfied with the results at this point.      No follow-ups on file.    Luis Clemons MD  [facility]  "  Jeffrey is a 89 year old, presenting for the following health issues:  Diabetes        12/12/2024     3:01 PM   Additional Questions   Roomed by Edouard ORDOÑEZ LPN     Via the Health Maintenance questionnaire, the patient has reported the following services have been completed -Eye Exam: [facility] 2024-04-11, this information has been sent to the abstraction team.  History of Present Illness       He eats 2-3 servings of fruits and vegetables daily.He consumes 0 sweetened beverage(s) daily.He exercises with enough effort to increase his heart rate 9 or less minutes per day.  He exercises with enough effort to increase his heart rate 4 days per week. He is missing 1 dose(s) of medications per week.     New diagnosis of type II diabetes. Up to 39 units of insulin glargine in evening    No low blood sugars.     Some swelling in feet, mild  Was prescribed compression stockings,   Reduced sodium.     Wt Readings from Last 5 Encounters:   12/12/24 68.5 kg (151 lb)   10/22/24 68.4 kg (150 lb 12.8 oz)   10/08/24 67 kg (147 lb 9.6 oz)   09/09/24 67 kg (147 lb 12.8 oz)   08/26/24 63.5 kg (140 lb)               -----------------------------  Dexcom Clarity  -----------------------------  Jeffrey Cespedes  YOB: 1932  Generated at: Thu, Dec 12, 2024 3:09 PM CST  Reporting period: Fri Nov 29, 2024 - Thu Dec 12, 2024  -----------------------------  Glucose Details  Average glucose: 169 mg/dL  Standard deviation: 41 mg/dL  GMI: 7.4%  -----------------------------  Time in Range  Very High: 7%  High: 22%  In Range: 71%  Low: 0%  Very Low: 0%  Target Range   mg/dL    -----------------------------  CGM Details  Sensor usage: 100%  Days with CGM data: 14/14          Objective    /68   Pulse 80   Temp 97.3  F (36.3  C) (Tympanic)   Resp 16   Ht 1.702 m (5' 7\")   Wt 68.5 kg (151 lb)   SpO2 97%   BMI 23.65 kg/m    Body mass index is 23.65 kg/m .  Physical Exam   General: Pleasant 89-year-old man seen in " clinic no acute distress  MSK: Advanced degenerative changes of bilateral knees.          PROCEDURE:  Intraarticular Injection of the Left Knee     PROCEDURAL PAUSE:    A procedural pause was conducted to verify:  correct patient identity, procedure to be performed, and as applicable, correct side, site, and correct patient position.      INFORMED CONSENT:   I discussed the risks, possible benefits, and alternatives to injection.  Following denial of allergy and review of potential side effects and complications (including but not necessarily limited to infection, allergic reaction, fat necrosis, skin depigmentation, local tissue breakdown, systemic effects of corticosteroids, elevation of blood glucose, injury to soft tissue and/or nerves, and seizure), all questions were answered, and consent was given to proceed.  Patient verbalized understanding.      PROCEDURE DETAILS:    Side and site were marked, and a time out was performed to verify appropriate patient identifiers.   Utilizing a 22-gauge needle the left intraarticular knee was injected using an anteromedial to posterolateral approach with 9mL of 1% Lidocaine,  and 1mL triamcinolone (40mg/mL).  0mL was wasted.      The patient tolerated the procedure without complication and was discharged in good condition after a short observation period.  The patient was instructed to contact me regarding any questions pertaining to the procedure.      DIAGNOSIS:    -Successful injection of the left intraarticular knee without immediate complication      PLAN:   -Post-procedure care reviewed, including avoiding submersion of the injection site for 48 hours   -Return precautions reviewed for signs/symptoms that would be concerning for infection   -The patient was instructed to ice and take APAP this evening if needed       PROCEDURE:  Intraarticular Injection of the Right Knee     PROCEDURAL PAUSE:    A procedural pause was conducted to verify:  correct patient identity,  procedure to be performed, and as applicable, correct side, site, and correct patient position.      INFORMED CONSENT:   I discussed the risks, possible benefits, and alternatives to injection.  Following denial of allergy and review of potential side effects and complications (including but not necessarily limited to infection, allergic reaction, fat necrosis, skin depigmentation, local tissue breakdown, systemic effects of corticosteroids, elevation of blood glucose, injury to soft tissue and/or nerves, and seizure), all questions were answered, and consent was given to proceed.  Patient verbalized understanding.      PROCEDURE DETAILS:    Side and site were marked, and a time out was performed to verify appropriate patient identifiers.   Utilizing a 22-gauge needle the right intraarticular knee was injected using an anteromedial to posterolateral approach with 9mL of 1% Lidocaine,  and 1mL triamcinolone (40mg/mL).  0mL was wasted.      The patient tolerated the procedure without complication and was discharged in good condition after a short observation period.  The patient was instructed to contact me regarding any questions pertaining to the procedure.      DIAGNOSIS:    -Successful injection of the right intraarticular knee without immediate complication      PLAN:   -Post-procedure care reviewed, including avoiding submersion of the injection site for 48 hours   -Return precautions reviewed for signs/symptoms that would be concerning for infection   -The patient was instructed to ice and take APAP this evening if needed        Signed Electronically by: Luis Clemons MD

## 2024-12-12 NOTE — NURSING NOTE
"Chief Complaint   Patient presents with    Diabetes       Initial /68   Pulse 80   Temp 97.3  F (36.3  C) (Tympanic)   Resp 16   Ht 1.702 m (5' 7\")   Wt 68.5 kg (151 lb)   SpO2 97%   BMI 23.65 kg/m   Estimated body mass index is 23.65 kg/m  as calculated from the following:    Height as of this encounter: 1.702 m (5' 7\").    Weight as of this encounter: 68.5 kg (151 lb).  Medication Review: complete    The next two questions are to help us understand your food security.  If you are feeling you need any assistance in this area, we have resources available to support you today.          7/1/2024   SDOH- Food Insecurity   Within the past 12 months, did you worry that your food would run out before you got money to buy more? N    Within the past 12 months, did the food you bought just not last and you didn t have money to get more? N        Patient-reported         Health Care Directive:  Patient has a Health Care Directive on file      Kasey Matthew LPN      "

## 2024-12-17 ENCOUNTER — TELEPHONE (OUTPATIENT)
Dept: INTERNAL MEDICINE | Facility: OTHER | Age: 89
End: 2024-12-17
Payer: COMMERCIAL

## 2024-12-17 NOTE — TELEPHONE ENCOUNTER
Please see other telephone encounter regarding this.   Kasey Matthew LPN on 12/17/2024 at 4:12 PM   Ext. [phone]

## 2024-12-17 NOTE — TELEPHONE ENCOUNTER
Problem/clarification with insulin prescription/injections  Patient also states that he called and had a telephone note sent last Friday and yesterday and has not received a call back       Marshall Ruiz on 12/17/2024 at 11:38 AM

## 2025-01-13 ENCOUNTER — TELEPHONE (OUTPATIENT)
Dept: INTERNAL MEDICINE | Facility: OTHER | Age: OVER 89
End: 2025-01-13
Payer: MEDICARE

## 2025-01-13 NOTE — TELEPHONE ENCOUNTER
S-(situation): Message received from Anabell Mcbride that she can see patient tomorrow    B-(background): Patient has been having unstable blood glucose levels    A-(assessment): Need clarification if Tanisha Toney would like to see him today, or if she would prefer he be seen by diabetic educator tomorrow, and if patient can make appointment tomorrow    R-(recommendations): Pending follow up  Rossi Colin RN on 1/13/2025 at 8:30 AM

## 2025-01-14 ENCOUNTER — ALLIED HEALTH/NURSE VISIT (OUTPATIENT)
Dept: EDUCATION SERVICES | Facility: OTHER | Age: OVER 89
End: 2025-01-14
Payer: MEDICARE

## 2025-01-14 DIAGNOSIS — E11.65 UNCONTROLLED TYPE 2 DIABETES MELLITUS WITH HYPERGLYCEMIA (H): Primary | ICD-10-CM

## 2025-01-14 PROCEDURE — G0108 DIAB MANAGE TRN  PER INDIV: HCPCS | Performed by: REGISTERED NURSE

## 2025-01-14 NOTE — PATIENT INSTRUCTIONS
CGM Report and Recommendations    CGM Report:    Report shows 147 mg/dl average sensor glucose over the past 5 days after stopping the insulin.    Standard deviation (SD) is +- 38 mg/dl. Goal for SD is +-50 mg/dl or lower.      Glucose 84% in target (70 - 180), 13% high (181 - 199), 3% very high (200 - 400), 0% low (56 - 69) and 0% very low (39 - 55).        No significant patterns of highs or lows found.      Recommendations:     Continue Metformin 1000 mg one tab twice daily, as usual.    See picture below showing 5-days with insulin therapy and Metformin vs 5-days with NO INSULIN and Metformin only.          No more low glucose after stopping the insulin.    Keep up the great work with your meal planning!      To help decrease the high glucose after breakfast, try to consume a smaller bowl of cereal or change to toast with your egg.    Please follow up, as needed, for continued diabetes education.      Anabell Mcbride RN, BSN, [facility] 1/14/2025 3:26 PM

## 2025-01-15 NOTE — PROGRESS NOTES
"Diabetes Self-Management Education & Support    Presents for:      Type of Service: In Person Visit      Assessment  Patient visits sharing he is wondering if he still needs insulin.  His glucose is 114 during visit today and he shares \"I stopped taking Lantus insulin on January 10th.\"  Patient notes he is still taking Metformin 1000 mg one tab twice daily and \"I have changed my meal plan, looking at labels and eating more vegetables, lean meat like skinless fish and pork loin.  I have stopped most of the sweets.\"      Patient states he was not dosing the insulin correctly when he got home from the pharmacy.  \"I was shown how to use my pen, but I did not do it right.  I attached the needle, removed the covers, then dialed up the dose, but instead of pushing the end of the pen, I thought I had to dial it back to zero which never delivered a unit.\"  Patient kept uping the dose, but never delivered the insulin.  He shares after he learned how to dose insulin correctly, \"I took 18 units and my morning glucose was 119 - 124, then I took 14 units once, then the insulin was stopped on January 9th because I had a bunch of lows.\"    Glucose reviewed before and after stopping Lantus insulin.      -----------------------------  Dexcom Clarity  -----------------------------  Jeffrey Cespedes  YOB: 1932  Generated at: Tue, Jan 14, 2025 2:52 PM CST  Reporting period: Fri Nikita 10, 2025 - Tue Jan 14, 2025  -----------------------------  Glucose Details  Average glucose: 147 mg/dL  Standard deviation: 38 mg/dL  GMI: N/A  -----------------------------  Time in Range  Very High: 3%  High: 13%  In Range: 84%  Low: 0%  Very Low: 0%  Target Range   mg/dL    -----------------------------  CGM Details  Sensor usage: 100%  Days with CGM data: 5/5        Discussed current glucose management without taking insulin.  Discussed importance of continuing meal planning to help keep glucose within target range.      Discussed D5 " Health Goals and patient has met 5 of 5 goals at this time.  (BP less than 140/90, ASA therapy as recommended, statin therapy as recommended, A1c less than 8%, tobacco free).      Care Plan and Education Provided:  Healthy Eating: learning options to lower carbohydrate and decrease risk of hyperglycemia after meals, Monitoring: Individual glucose targets, Problem Solving: High glucose - causes, signs/symptoms, treatment and prevention, Low glucose - causes, signs/symptoms, treatment and prevention, and Rule of 15 and carrying a carbohydrate source at all times in case of low glucose, and Reducing Risks: Goal for A1c, how it relates to glucose and how often to check, Preventing cardiovascular disease, including blood pressure goals, lipid goals, recommendations for cardioprotective medications, statins, and aspirin, Prevention, early diagnostic measures and treatment of complications, and A1C - goals, relating to blood glucose levels, how often to check    Patient verbalized understanding of diabetes self-management education concepts discussed, opportunities for ongoing education and support, and recommendations provided today.    Plan  Continue Metformin 1000 mg one tab twice daily with meals.  See patient instructions for complete plan.     Topics to cover at upcoming visits: Problem Solving    Follow-up:  Upcoming Diabetes Ed Appointments     Visit Type Date Time Department    DIABETES ED 1/14/2025  2:30 PM  DIABETES EDUCATION        See Care Plan for co-developed, patient-state behavior change goals.    Education Materials Provided:  Dexcom Clarity Comparison report, D5 Health Goals      Subjective/Objective  Jeffrey is an 89 year old year old, presenting for the following diabetes education related to:    Cultural Influences/Ethnic Background:  Not  or     Diabetes Symptoms & Complications:     Patient Problem List and Family Medical History reviewed for relevant medical history, current medical  "status, and diabetes risk factors.    Vitals:  There were no vitals taken for this visit.  Estimated body mass index is 23.65 kg/m  as calculated from the following:    Height as of 12/12/24: 1.702 m (5' 7\").    Weight as of 12/12/24: 68.5 kg (151 lb).   Last 3 BP:   BP Readings from Last 3 Encounters:   12/12/24 116/68   10/22/24 126/60   10/08/24 116/60       History   Smoking Status    Former    Types: Cigarettes   Smokeless Tobacco    Never       Labs:  Lab Results   Component Value Date    A1C 5.7 12/12/2024    A1C 5.3 12/14/2020     Lab Results   Component Value Date     12/12/2024     04/07/2022     12/14/2020     Lab Results   Component Value Date    LDL 39 04/09/2024    LDL 53 12/14/2020     HDL Cholesterol   Date Value Ref Range Status   12/14/2020 48 23 - 92 mg/dL Final     Direct Measure HDL   Date Value Ref Range Status   04/09/2024 56 >=40 mg/dL Final   ]  GFR Estimate   Date Value Ref Range Status   12/12/2024 83 >60 mL/min/1.73m2 Final     Comment:     eGFR calculated using 2021 CKD-EPI equation.   12/14/2020 84 >60 mL/min/[1.73_m2] Final     GFR Estimate If Black   Date Value Ref Range Status   12/14/2020 >90 >60 mL/min/[1.73_m2] Final     Lab Results   Component Value Date    CR 0.79 12/12/2024    CR 0.86 12/14/2020     No results found for: \"MICROALBUMIN\"        Anabell Mcbride RN  Time Spent: 60 minutes  Encounter Type: Individual    Any diabetes medication dose changes were made via the CDC[facility] Standing Orders under the patient's referring provider.  "

## 2025-01-30 ENCOUNTER — TELEPHONE (OUTPATIENT)
Dept: UROLOGY | Facility: OTHER | Age: OVER 89
End: 2025-01-30
Payer: MEDICARE

## 2025-01-30 DIAGNOSIS — R39.9 LOWER URINARY TRACT SYMPTOMS (LUTS): ICD-10-CM

## 2025-01-30 RX ORDER — TAMSULOSIN HYDROCHLORIDE 0.4 MG/1
0.4 CAPSULE ORAL DAILY
Qty: 90 CAPSULE | Refills: 2 | Status: SHIPPED | OUTPATIENT
Start: 2025-01-30

## 2025-01-30 NOTE — TELEPHONE ENCOUNTER
Patient has a question regarding his tamsulosin.  Please contact patient.    Dania Lane on 1/30/2025 at 3:16 PM

## 2025-01-30 NOTE — TELEPHONE ENCOUNTER
Needs new order for Tamulosin 0.4 mg 1 tablet daily in the evening. Order changed per verbal order and notes dated : 10/22/24. Yara Diaz RN on 1/30/2025 at 4:13 PM

## 2025-02-28 PROBLEM — E11.40 TYPE 2 DIABETES MELLITUS WITH DIABETIC NEUROPATHY, WITHOUT LONG-TERM CURRENT USE OF INSULIN (H): Status: ACTIVE | Noted: 2025-02-28

## 2025-04-05 ENCOUNTER — HEALTH MAINTENANCE LETTER (OUTPATIENT)
Age: OVER 89
End: 2025-04-05

## 2025-05-17 ENCOUNTER — HEALTH MAINTENANCE LETTER (OUTPATIENT)
Age: OVER 89
End: 2025-05-17

## 2025-05-22 ENCOUNTER — LAB (OUTPATIENT)
Dept: LAB | Facility: OTHER | Age: OVER 89
End: 2025-05-22
Attending: FAMILY MEDICINE
Payer: MEDICARE

## 2025-05-22 ENCOUNTER — RESULTS FOLLOW-UP (OUTPATIENT)
Dept: FAMILY MEDICINE | Facility: OTHER | Age: OVER 89
End: 2025-05-22

## 2025-05-22 DIAGNOSIS — E11.9 TYPE 2 DIABETES MELLITUS WITHOUT COMPLICATION, UNSPECIFIED WHETHER LONG TERM INSULIN USE (H): ICD-10-CM

## 2025-05-22 LAB
ALBUMIN SERPL BCG-MCNC: 4.6 G/DL (ref 3.5–5.2)
ALBUMIN UR-MCNC: NEGATIVE MG/DL
ALP SERPL-CCNC: 75 U/L (ref 40–150)
ALT SERPL W P-5'-P-CCNC: 17 U/L (ref 0–70)
ANION GAP SERPL CALCULATED.3IONS-SCNC: 12 MMOL/L (ref 7–15)
APPEARANCE UR: CLEAR
AST SERPL W P-5'-P-CCNC: 18 U/L (ref 0–45)
BILIRUB SERPL-MCNC: 1.3 MG/DL
BILIRUB UR QL STRIP: NEGATIVE
BUN SERPL-MCNC: 21.5 MG/DL (ref 8–23)
CALCIUM SERPL-MCNC: 9.2 MG/DL (ref 8.8–10.4)
CHLORIDE SERPL-SCNC: 102 MMOL/L (ref 98–107)
COLOR UR AUTO: YELLOW
CREAT SERPL-MCNC: 0.75 MG/DL (ref 0.67–1.17)
EGFRCR SERPLBLD CKD-EPI 2021: 84 ML/MIN/1.73M2
ERYTHROCYTE [DISTWIDTH] IN BLOOD BY AUTOMATED COUNT: 13.6 % (ref 10–15)
EST. AVERAGE GLUCOSE BLD GHB EST-MCNC: 94 MG/DL
GLUCOSE SERPL-MCNC: 136 MG/DL (ref 70–99)
GLUCOSE UR STRIP-MCNC: NEGATIVE MG/DL
HBA1C MFR BLD: 4.9 %
HCO3 SERPL-SCNC: 26 MMOL/L (ref 22–29)
HCT VFR BLD AUTO: 34.8 % (ref 40–53)
HGB BLD-MCNC: 12.5 G/DL (ref 13.3–17.7)
HGB UR QL STRIP: NEGATIVE
KETONES UR STRIP-MCNC: NEGATIVE MG/DL
LEUKOCYTE ESTERASE UR QL STRIP: NEGATIVE
MCH RBC QN AUTO: 33.1 PG (ref 26.5–33)
MCHC RBC AUTO-ENTMCNC: 35.9 G/DL (ref 31.5–36.5)
MCV RBC AUTO: 92 FL (ref 78–100)
NITRATE UR QL: NEGATIVE
PH UR STRIP: 6.5 [PH] (ref 5–9)
PLATELET # BLD AUTO: 99 10E3/UL (ref 150–450)
POTASSIUM SERPL-SCNC: 4 MMOL/L (ref 3.4–5.3)
PROT SERPL-MCNC: 7 G/DL (ref 6.4–8.3)
RBC # BLD AUTO: 3.78 10E6/UL (ref 4.4–5.9)
SODIUM SERPL-SCNC: 140 MMOL/L (ref 135–145)
SP GR UR STRIP: 1.02 (ref 1–1.03)
UROBILINOGEN UR STRIP-MCNC: NORMAL MG/DL
WBC # BLD AUTO: 6 10E3/UL (ref 4–11)

## 2025-05-22 PROCEDURE — 85041 AUTOMATED RBC COUNT: CPT | Mod: ZL

## 2025-05-22 PROCEDURE — 84155 ASSAY OF PROTEIN SERUM: CPT | Mod: ZL

## 2025-05-22 PROCEDURE — 36415 COLL VENOUS BLD VENIPUNCTURE: CPT | Mod: ZL

## 2025-05-22 PROCEDURE — 81003 URINALYSIS AUTO W/O SCOPE: CPT | Mod: ZL

## 2025-05-22 PROCEDURE — 83036 HEMOGLOBIN GLYCOSYLATED A1C: CPT | Mod: ZL

## 2025-05-28 ENCOUNTER — OFFICE VISIT (OUTPATIENT)
Dept: FAMILY MEDICINE | Facility: OTHER | Age: OVER 89
End: 2025-05-28
Attending: FAMILY MEDICINE
Payer: MEDICARE

## 2025-05-28 VITALS
SYSTOLIC BLOOD PRESSURE: 128 MMHG | BODY MASS INDEX: 23.56 KG/M2 | WEIGHT: 150.4 LBS | HEART RATE: 70 BPM | RESPIRATION RATE: 18 BRPM | TEMPERATURE: 97.6 F | DIASTOLIC BLOOD PRESSURE: 75 MMHG | OXYGEN SATURATION: 98 %

## 2025-05-28 DIAGNOSIS — E11.9 TYPE 2 DIABETES MELLITUS WITHOUT COMPLICATION, UNSPECIFIED WHETHER LONG TERM INSULIN USE (H): Primary | ICD-10-CM

## 2025-05-28 DIAGNOSIS — E11.649 TYPE 2 DIABETES MELLITUS WITH HYPOGLYCEMIA WITHOUT COMA, WITHOUT LONG-TERM CURRENT USE OF INSULIN (H): ICD-10-CM

## 2025-05-28 DIAGNOSIS — I47.10 PAROXYSMAL SUPRAVENTRICULAR TACHYCARDIA: ICD-10-CM

## 2025-05-28 DIAGNOSIS — B35.3 TINEA PEDIS OF RIGHT FOOT: ICD-10-CM

## 2025-05-28 PROCEDURE — G0463 HOSPITAL OUTPT CLINIC VISIT: HCPCS

## 2025-05-28 RX ORDER — METFORMIN HYDROCHLORIDE 500 MG/1
500 TABLET, EXTENDED RELEASE ORAL 2 TIMES DAILY WITH MEALS
Qty: 180 TABLET | Refills: 3 | Status: SHIPPED | OUTPATIENT
Start: 2025-05-28

## 2025-05-28 ASSESSMENT — ENCOUNTER SYMPTOMS
EYE PAIN: 0
PALPITATIONS: 0
HEMATURIA: 0
CHILLS: 0
SORE THROAT: 0
COUGH: 0
DYSURIA: 0
FEVER: 0
BACK PAIN: 0
SHORTNESS OF BREATH: 0
VOMITING: 0
SEIZURES: 0
COLOR CHANGE: 0
ARTHRALGIAS: 1
ABDOMINAL PAIN: 0
WHEEZING: 0

## 2025-05-28 ASSESSMENT — PAIN SCALES - GENERAL: PAINLEVEL_OUTOF10: MILD PAIN (3)

## 2025-05-28 NOTE — PROGRESS NOTES
Assessment & Plan   Problem List Items Addressed This Visit    None  Visit Diagnoses         Type 2 diabetes mellitus without complication, unspecified whether long term insulin use (H)    -  Primary    Relevant Medications    metFORMIN (GLUCOPHAGE XR) 500 MG 24 hr tablet    Other Relevant Orders    Comprehensive metabolic panel    Hemoglobin A1c    UA Macroscopic with reflex to Microscopic and Culture    CBC with platelets      Type 2 diabetes mellitus with hypoglycemia without coma, without long-term current use of insulin (H)        Relevant Medications    metFORMIN (GLUCOPHAGE XR) 500 MG 24 hr tablet              Diagnoses and all orders for this visit:  Type 2 diabetes mellitus without complication, unspecified whether long term insulin use (H)  -     Comprehensive metabolic panel; Future  -     Hemoglobin A1c; Future  -     UA Macroscopic with reflex to Microscopic and Culture; Future  -     CBC with platelets; Future  Type 2 diabetes mellitus with hypoglycemia without coma, without long-term current use of insulin (H)  -     metFORMIN (GLUCOPHAGE XR) 500 MG 24 hr tablet; Take 1 tablet (500 mg) by mouth 2 times daily (with meals).      I explained to the patient that I think his A1c of 4.9 is erroneous.  I did call the lab and asked them to look into this.  Concerning his toe I advised that he continue using moleskin to separate the 4th and 5th digits of the right foot.  I explained that he should do this indefinitely and the induration will resolve.  I see nothing left of the fungal infection.  No change to his medication as far as his diabetes is concerned.  In regards to his knees I asked him to schedule an appointment at his convenience for arthrocentesis.  The longitudinal plan of care for the diagnosis(es)/condition(s) as documented were addressed during this visit. Due to the added complexity in care, I will continue to support Jeffrey in the subsequent management and with ongoing continuity of  care.      Subjective   Jeffrey Tate A 89 year old male    Patient returns today for diabetes check.  He did have some questions about his lab values.  His recent A1c said he had a value of 4.9%.  His Dexcom G7 shows 6.9%.  He states his blood sugar average has been in the 130s.  He has been feeling well.  He did finish the Diflucan for his tinea pedis.  He states that the ulcer has overall healed up but he still has a red spot on his toe he would like me to look at.  Other than this he states he would like to discuss his mild anemia and he states his knees are hurting again and he would like another round of steroid injections.    History of Present Illness       He eats 2-3 servings of fruits and vegetables daily.He consumes 0 sweetened beverage(s) daily.He exercises with enough effort to increase his heart rate 9 or less minutes per day.  He exercises with enough effort to increase his heart rate 4 days per week. He is missing 1 dose(s) of medications per week.  He is not taking prescribed medications regularly due to remembering to take.    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    Calculus of kidney     1977, 1989,Reoccured    Enlarged prostate with lower urinary tract symptoms (LUTS)     No Comments Provided    Essential (primary) hypertension     No Comments Provided    Osteoarthritis     No Comments Provided    Personal history of other malignant neoplasm of skin (CODE)     2010    SVT (supraventricular tachycardia)     Zoster without complications     2009      reports that he quit smoking about 71 years ago. His smoking use included cigarettes. He has been exposed to tobacco smoke. He has never used smokeless tobacco. He reports current alcohol use of about 4.2 standard drinks of alcohol per week. He reports that he does not use drugs.  Family History   Problem Relation Age of Onset    Other -  See Comments Mother         Old age    Other - See Comments Father         Stroke, 94    Cancer Sister         Cancer,In bones--left femur    HIV/AIDS Brother         HIV    Other - See Comments Brother         While getting pacemaker     Allergies   Allergen Reactions    Ace Inhibitors Cough    Iodine Hives       Current Outpatient Medications:     ASPIRIN 81 PO, Take 81 mg by mouth daily, Disp: , Rfl:     blood glucose (NO BRAND SPECIFIED) test strip, Use to test blood sugar 4 times daily . To accompany: Blood Glucose Monitor Brands: per insurance., Disp: 100 strip, Rfl: 6    blood glucose monitoring (NO BRAND SPECIFIED) meter device kit, Use to test blood sugar 4 times daily. Preferred blood glucose meter OR supplies to accompany: Blood Glucose Monitor Brands: per insurance., Disp: 1 kit, Rfl: 0    Continuous Glucose  (DEXCOM G7 ) SOSA, 1 each daily. - use as directed with sensors, Disp: 1 each, Rfl: 0    Continuous Glucose Sensor (DEXCOM G7 SENSOR) MISC, 1 each every 10 days. Replace / Apply as directed, Disp: 9 each, Rfl: 11    diltiazem ER (DILT-XR) 180 MG 24 hr capsule, Take 1 capsule (180 mg) by mouth daily., Disp: 90 capsule, Rfl: 4    finasteride (PROSCAR) 5 MG tablet, Take 1 tablet (5 mg) by mouth daily., Disp: 90 tablet, Rfl: 4    Glucosamine Sulfate 500 MG TABS, Take 1,000 mg by mouth daily, Disp: , Rfl:     hydrochlorothiazide (HYDRODIURIL) 25 MG tablet, Take 1 tablet (25 mg) by mouth daily., Disp: 90 tablet, Rfl: 4    magnesium 250 MG tablet, Take 250 mg by mouth, Disp: , Rfl:     metFORMIN (GLUCOPHAGE XR) 500 MG 24 hr tablet, Take 1 tablet (500 mg) by mouth 2 times daily (with meals)., Disp: 180 tablet, Rfl: 3    metoprolol succinate ER (TOPROL XL) 50 MG 24 hr tablet, Take 1 tablet (50 mg) by mouth daily., Disp: 90 tablet, Rfl: 4    Multiple Vitamin (MULTI-VITAMINS) TABS, Take 1 tablet by mouth daily, Disp: , Rfl:     tamsulosin (FLOMAX) 0.4 MG capsule, Take 1 capsule (0.4 mg) by  mouth daily. Take 1 capsules (0.4 mg) by mouth daily., Disp: 90 capsule, Rfl: 2    thin (NO BRAND SPECIFIED) lancets, Use with lanceting device. To accompany: Blood Glucose Monitor Brands: per insurance., Disp: 100 each, Rfl: 6  Review of Systems   Constitutional:  Negative for chills and fever.   HENT:  Negative for ear pain and sore throat.    Eyes:  Negative for pain and visual disturbance.   Respiratory:  Negative for cough, shortness of breath and wheezing.    Cardiovascular:  Negative for chest pain, palpitations and leg swelling.   Gastrointestinal:  Negative for abdominal pain and vomiting.   Genitourinary:  Negative for dysuria and hematuria.   Musculoskeletal:  Positive for arthralgias. Negative for back pain.   Skin:  Positive for rash. Negative for color change.   Neurological:  Negative for seizures and syncope.   All other systems reviewed and are negative.        Objective      /75 (BP Location: Right arm, Patient Position: Sitting, Cuff Size: Adult Regular)   Pulse 70   Temp 97.6  F (36.4  C) (Temporal)   Resp 18   Wt 68.2 kg (150 lb 6.4 oz)   SpO2 98%   BMI 23.56 kg/m    Body mass index is 23.56 kg/m .  Physical Exam  Constitutional:       Appearance: Normal appearance.   HENT:      Head: Normocephalic.      Right Ear: Tympanic membrane and ear canal normal.      Left Ear: Tympanic membrane and ear canal normal.      Nose: Nose normal.      Mouth/Throat:      Mouth: Mucous membranes are moist.      Pharynx: Oropharynx is clear.   Eyes:      Conjunctiva/sclera: Conjunctivae normal.   Cardiovascular:      Rate and Rhythm: Normal rate and regular rhythm.      Heart sounds: Normal heart sounds.   Pulmonary:      Effort: Pulmonary effort is normal.      Breath sounds: Normal breath sounds.   Abdominal:      General: Abdomen is flat.      Palpations: Abdomen is soft.   Musculoskeletal:         General: Normal range of motion.      Cervical back: Neck supple.   Skin:     General: Skin is warm  and dry.      Comments: Tinea pedis evaluation of the right foot shows scaling skin is much better.  He does have some induration where the fifth digit is coming in contact with the fourth digit and the skin is somewhat macerated.  The ulcer is healed.   Neurological:      General: No focal deficit present.      Mental Status: He is alert and oriented to person, place, and time. Mental status is at baseline.   Psychiatric:         Mood and Affect: Mood normal.         Behavior: Behavior normal.         Lab Results   Component Value Date    WBC 6.0 05/22/2025    HGB 12.5 (L) 05/22/2025    PLT 99 (L) 05/22/2025    CHOL 109 04/09/2024    TRIG 70 04/09/2024    HDL 56 04/09/2024    ALT 17 05/22/2025    AST 18 05/22/2025     05/22/2025    BUN 21.5 05/22/2025    CO2 26 05/22/2025    TSH 2.53 08/26/2024    PSA 4.77 08/26/2024       Lab on 05/22/2025   Component Date Value Ref Range Status    WBC Count 05/22/2025 6.0  4.0 - 11.0 10e3/uL Final    RBC Count 05/22/2025 3.78 (L)  4.40 - 5.90 10e6/uL Final    Hemoglobin 05/22/2025 12.5 (L)  13.3 - 17.7 g/dL Final    Hematocrit 05/22/2025 34.8 (L)  40.0 - 53.0 % Final    MCV 05/22/2025 92  78 - 100 fL Final    MCH 05/22/2025 33.1 (H)  26.5 - 33.0 pg Final    MCHC 05/22/2025 35.9  31.5 - 36.5 g/dL Final    RDW 05/22/2025 13.6  10.0 - 15.0 % Final    Platelet Count 05/22/2025 99 (L)  150 - 450 10e3/uL Final    Color Urine 05/22/2025 Yellow  Colorless, Straw, Light Yellow, Yellow Final    Appearance Urine 05/22/2025 Clear  Clear Final    Glucose Urine 05/22/2025 Negative  Negative mg/dL Final    Bilirubin Urine 05/22/2025 Negative  Negative Final    Ketones Urine 05/22/2025 Negative  Negative mg/dL Final    Specific Gravity Urine 05/22/2025 1.022  1.000 - 1.030 Final    Blood Urine 05/22/2025 Negative  Negative Final    pH Urine 05/22/2025 6.5  5.0 - 9.0 Final    Protein Albumin Urine 05/22/2025 Negative  Negative mg/dL Final    Urobilinogen Urine 05/22/2025 Normal  Normal  "mg/dL Final    Nitrite Urine 05/22/2025 Negative  Negative Final    Leukocyte Esterase Urine 05/22/2025 Negative  Negative Final    Estimated Average Glucose 05/22/2025 94  <117 mg/dL Final    Hemoglobin A1C 05/22/2025 4.9  <5.7 % Final    Normal <5.7%   Prediabetes 5.7-6.4%    Diabetes 6.5% or higher     Note: Adopted from ADA consensus guidelines.    Sodium 05/22/2025 140  135 - 145 mmol/L Final    Potassium 05/22/2025 4.0  3.4 - 5.3 mmol/L Final    Carbon Dioxide (CO2) 05/22/2025 26  22 - 29 mmol/L Final    Anion Gap 05/22/2025 12  7 - 15 mmol/L Final    Urea Nitrogen 05/22/2025 21.5  8.0 - 23.0 mg/dL Final    Creatinine 05/22/2025 0.75  0.67 - 1.17 mg/dL Final    GFR Estimate 05/22/2025 84  >60 mL/min/1.73m2 Final    eGFR calculated using 2021 CKD-EPI equation.    Calcium 05/22/2025 9.2  8.8 - 10.4 mg/dL Final    Chloride 05/22/2025 102  98 - 107 mmol/L Final    Glucose 05/22/2025 136 (H)  70 - 99 mg/dL Final    Alkaline Phosphatase 05/22/2025 75  40 - 150 U/L Final    AST 05/22/2025 18  0 - 45 U/L Final    ALT 05/22/2025 17  0 - 70 U/L Final    Protein Total 05/22/2025 7.0  6.4 - 8.3 g/dL Final    Albumin 05/22/2025 4.6  3.5 - 5.2 g/dL Final    Bilirubin Total 05/22/2025 1.3 (H)  <=1.2 mg/dL Final         No results for input(s): \"TSH\", \"UA\", \"PSA\", \"HGBA1C\" in the last 336 hours.    Invalid input(s): \"CBC\", \"CMP\", \"LIPIDS\", \"BMP\", \"MICROALBU\"         "

## 2025-05-28 NOTE — NURSING NOTE
"Chief Complaint   Patient presents with    Diabetes     Patient presents for diabetic follow up.  Currently rates pain 3/10 in knees at this time.      Initial /75 (BP Location: Right arm, Patient Position: Sitting, Cuff Size: Adult Regular)   Pulse 70   Temp 97.6  F (36.4  C) (Temporal)   Resp 18   Wt 68.2 kg (150 lb 6.4 oz)   SpO2 98%   BMI 23.56 kg/m   Estimated body mass index is 23.56 kg/m  as calculated from the following:    Height as of 2/28/25: 1.702 m (5' 7\").    Weight as of this encounter: 68.2 kg (150 lb 6.4 oz).  Medication Review: complete    The next two questions are to help us understand your food security.  If you are feeling you need any assistance in this area, we have resources available to support you today.          2/28/2025   SDOH- Food Insecurity   Within the past 12 months, did you worry that your food would run out before you got money to buy more? N   Within the past 12 months, did the food you bought just not last and you didn t have money to get more? N        Data saved with a previous flowsheet row definition         Health Care Directive:  Patient has a Health Care Directive on file      Anita Justice LPN on 5/28/2025 at 11:16 AM        "

## 2025-07-25 ENCOUNTER — TELEPHONE (OUTPATIENT)
Dept: FAMILY MEDICINE | Facility: OTHER | Age: OVER 89
End: 2025-07-25
Payer: COMMERCIAL

## 2025-07-25 NOTE — TELEPHONE ENCOUNTER
Dr. Kay,   Patient states you wanted his Dexcom G7 sensor averages from the last 14 days since his metformin dose reduction.   The last 14 day average: 2% very high (guessing high 200's or 300's), 15% high, 81% in range, 1% low, 1% very low (51). Average  for the last 14 days. GMI rating 6.8.   Mackenzie Richard RN on 7/25/2025 at 10:05 AM

## 2025-08-30 ENCOUNTER — HEALTH MAINTENANCE LETTER (OUTPATIENT)
Age: OVER 89
End: 2025-08-30

## (undated) RX ORDER — TRIAMCINOLONE ACETONIDE 40 MG/ML
INJECTION, SUSPENSION INTRA-ARTICULAR; INTRAMUSCULAR
Status: DISPENSED
Start: 2021-04-13

## (undated) RX ORDER — LIDOCAINE HYDROCHLORIDE 10 MG/ML
INJECTION, SOLUTION EPIDURAL; INFILTRATION; INTRACAUDAL; PERINEURAL
Status: DISPENSED
Start: 2021-06-02

## (undated) RX ORDER — BUPIVACAINE HYDROCHLORIDE 5 MG/ML
INJECTION, SOLUTION EPIDURAL; INTRACAUDAL
Status: DISPENSED
Start: 2024-04-29

## (undated) RX ORDER — TRIAMCINOLONE ACETONIDE 40 MG/ML
INJECTION, SUSPENSION INTRA-ARTICULAR; INTRAMUSCULAR
Status: DISPENSED
Start: 2024-04-29

## (undated) RX ORDER — TRIAMCINOLONE ACETONIDE 40 MG/ML
INJECTION, SUSPENSION INTRA-ARTICULAR; INTRAMUSCULAR
Status: DISPENSED
Start: 2021-06-02

## (undated) RX ORDER — LIDOCAINE HYDROCHLORIDE 10 MG/ML
INJECTION, SOLUTION INFILTRATION; PERINEURAL
Status: DISPENSED
Start: 2024-12-12

## (undated) RX ORDER — BUPIVACAINE HYDROCHLORIDE 5 MG/ML
INJECTION, SOLUTION EPIDURAL; INTRACAUDAL
Status: DISPENSED
Start: 2021-04-13

## (undated) RX ORDER — TRIAMCINOLONE ACETONIDE 40 MG/ML
INJECTION, SUSPENSION INTRA-ARTICULAR; INTRAMUSCULAR
Status: DISPENSED
Start: 2024-12-12

## (undated) RX ORDER — LIDOCAINE HYDROCHLORIDE 10 MG/ML
INJECTION, SOLUTION INFILTRATION; PERINEURAL
Status: DISPENSED
Start: 2024-04-29